# Patient Record
Sex: MALE | Race: ASIAN | HISPANIC OR LATINO | Employment: FULL TIME | URBAN - METROPOLITAN AREA
[De-identification: names, ages, dates, MRNs, and addresses within clinical notes are randomized per-mention and may not be internally consistent; named-entity substitution may affect disease eponyms.]

---

## 2024-03-20 ENCOUNTER — TELEPHONE (OUTPATIENT)
Dept: GASTROENTEROLOGY | Facility: CLINIC | Age: 54
End: 2024-03-20

## 2024-03-20 NOTE — TELEPHONE ENCOUNTER
Per Dr Swain, pts wife C/O of diverticulitis flare. Needs an appt to be seen in clinic with him.  Called Milind, no answer. Message left.  Phone: [phone]

## 2024-04-23 ENCOUNTER — HOSPITAL ENCOUNTER (INPATIENT)
Facility: HOSPITAL | Age: 54
LOS: 3 days | Discharge: HOME | DRG: 280 | End: 2024-04-26
Attending: FAMILY MEDICINE | Admitting: INTERNAL MEDICINE
Payer: COMMERCIAL

## 2024-04-23 DIAGNOSIS — N17.9 AKI (ACUTE KIDNEY INJURY): ICD-10-CM

## 2024-04-23 DIAGNOSIS — I47.20 V-TACH: ICD-10-CM

## 2024-04-23 DIAGNOSIS — I46.9 CARDIAC ARREST: Primary | ICD-10-CM

## 2024-04-23 DIAGNOSIS — I21.4 NSTEMI (NON-ST ELEVATED MYOCARDIAL INFARCTION): ICD-10-CM

## 2024-04-23 LAB
ALBUMIN SERPL BCP-MCNC: 4.1 G/DL (ref 3.5–5.2)
ALLENS TEST: ABNORMAL
ALP SERPL-CCNC: 102 U/L (ref 55–135)
ALT SERPL W/O P-5'-P-CCNC: 130 U/L (ref 10–44)
AMPHET+METHAMPHET UR QL: NEGATIVE
ANION GAP SERPL CALC-SCNC: 12 MMOL/L (ref 8–16)
APTT PPP: 26.1 SEC (ref 21–32)
AST SERPL-CCNC: 108 U/L (ref 10–40)
BACTERIA #/AREA URNS HPF: ABNORMAL /HPF
BARBITURATES UR QL SCN>200 NG/ML: NEGATIVE
BASOPHILS # BLD AUTO: 0.05 K/UL (ref 0–0.2)
BASOPHILS # BLD AUTO: 0.07 K/UL (ref 0–0.2)
BASOPHILS NFR BLD: 0.3 % (ref 0–1.9)
BASOPHILS NFR BLD: 0.9 % (ref 0–1.9)
BENZODIAZ UR QL SCN>200 NG/ML: NEGATIVE
BILIRUB SERPL-MCNC: 0.6 MG/DL (ref 0.1–1)
BILIRUB UR QL STRIP: NEGATIVE
BNP SERPL-MCNC: <10 PG/ML (ref 0–99)
BUN SERPL-MCNC: 16 MG/DL (ref 6–20)
BZE UR QL SCN: NEGATIVE
CALCIUM SERPL-MCNC: 9.8 MG/DL (ref 8.7–10.5)
CANNABINOIDS UR QL SCN: NEGATIVE
CHLORIDE SERPL-SCNC: 106 MMOL/L (ref 95–110)
CLARITY UR: CLEAR
CO2 SERPL-SCNC: 20 MMOL/L (ref 23–29)
COLOR UR: YELLOW
CREAT SERPL-MCNC: 1.7 MG/DL (ref 0.5–1.4)
CREAT UR-MCNC: 30.7 MG/DL (ref 23–375)
DELSYS: ABNORMAL
DIFFERENTIAL METHOD BLD: ABNORMAL
DIFFERENTIAL METHOD BLD: ABNORMAL
EOSINOPHIL # BLD AUTO: 0 K/UL (ref 0–0.5)
EOSINOPHIL # BLD AUTO: 0 K/UL (ref 0–0.5)
EOSINOPHIL NFR BLD: 0.1 % (ref 0–8)
EOSINOPHIL NFR BLD: 0.5 % (ref 0–8)
ERYTHROCYTE [DISTWIDTH] IN BLOOD BY AUTOMATED COUNT: 12.8 % (ref 11.5–14.5)
ERYTHROCYTE [DISTWIDTH] IN BLOOD BY AUTOMATED COUNT: 12.8 % (ref 11.5–14.5)
ERYTHROCYTE [SEDIMENTATION RATE] IN BLOOD BY WESTERGREN METHOD: 20 MM/H
EST. GFR  (NO RACE VARIABLE): 48 ML/MIN/1.73 M^2
FIO2: 100
GLUCOSE SERPL-MCNC: 195 MG/DL (ref 70–110)
GLUCOSE UR QL STRIP: ABNORMAL
HCO3 UR-SCNC: 23.2 MMOL/L (ref 24–28)
HCT VFR BLD AUTO: 45.4 % (ref 40–54)
HCT VFR BLD AUTO: 46.7 % (ref 40–54)
HGB BLD-MCNC: 16.1 G/DL (ref 14–18)
HGB BLD-MCNC: 16.5 G/DL (ref 14–18)
HGB UR QL STRIP: ABNORMAL
HYALINE CASTS #/AREA URNS LPF: 0 /LPF
IMM GRANULOCYTES # BLD AUTO: 0.06 K/UL (ref 0–0.04)
IMM GRANULOCYTES # BLD AUTO: 0.07 K/UL (ref 0–0.04)
IMM GRANULOCYTES NFR BLD AUTO: 0.5 % (ref 0–0.5)
IMM GRANULOCYTES NFR BLD AUTO: 0.8 % (ref 0–0.5)
INR PPP: 0.9 (ref 0.8–1.2)
KETONES UR QL STRIP: NEGATIVE
LACTATE SERPL-SCNC: 3.8 MMOL/L (ref 0.5–2.2)
LEUKOCYTE ESTERASE UR QL STRIP: NEGATIVE
LYMPHOCYTES # BLD AUTO: 1.3 K/UL (ref 1–4.8)
LYMPHOCYTES # BLD AUTO: 3.4 K/UL (ref 1–4.8)
LYMPHOCYTES NFR BLD: 44.9 % (ref 18–48)
LYMPHOCYTES NFR BLD: 8.6 % (ref 18–48)
MAGNESIUM SERPL-MCNC: 2.2 MG/DL (ref 1.6–2.6)
MCH RBC QN AUTO: 31.9 PG (ref 27–31)
MCH RBC QN AUTO: 32 PG (ref 27–31)
MCHC RBC AUTO-ENTMCNC: 35.3 G/DL (ref 32–36)
MCHC RBC AUTO-ENTMCNC: 35.5 G/DL (ref 32–36)
MCV RBC AUTO: 90 FL (ref 82–98)
MCV RBC AUTO: 91 FL (ref 82–98)
METHADONE UR QL SCN>300 NG/ML: NEGATIVE
MICROSCOPIC COMMENT: ABNORMAL
MODE: ABNORMAL
MONOCYTES # BLD AUTO: 0.6 K/UL (ref 0.3–1)
MONOCYTES # BLD AUTO: 1.1 K/UL (ref 0.3–1)
MONOCYTES NFR BLD: 7.4 % (ref 4–15)
MONOCYTES NFR BLD: 7.9 % (ref 4–15)
NEUTROPHILS # BLD AUTO: 12.7 K/UL (ref 1.8–7.7)
NEUTROPHILS # BLD AUTO: 3.4 K/UL (ref 1.8–7.7)
NEUTROPHILS NFR BLD: 45 % (ref 38–73)
NEUTROPHILS NFR BLD: 83.1 % (ref 38–73)
NITRITE UR QL STRIP: NEGATIVE
NRBC BLD-RTO: 0 /100 WBC
NRBC BLD-RTO: 0 /100 WBC
OPIATES UR QL SCN: NEGATIVE
PCO2 BLDA: 42.6 MMHG (ref 35–45)
PCP UR QL SCN>25 NG/ML: NEGATIVE
PEEP: 10
PH SMN: 7.34 [PH] (ref 7.35–7.45)
PH UR STRIP: 7 [PH] (ref 5–8)
PLATELET # BLD AUTO: 210 K/UL (ref 150–450)
PLATELET # BLD AUTO: 237 K/UL (ref 150–450)
PMV BLD AUTO: 10.2 FL (ref 9.2–12.9)
PMV BLD AUTO: 10.5 FL (ref 9.2–12.9)
PO2 BLDA: 508 MMHG (ref 80–100)
POC BE: -2 MMOL/L
POC SATURATED O2: 100 % (ref 95–100)
POTASSIUM SERPL-SCNC: 3.2 MMOL/L (ref 3.5–5.1)
PROCALCITONIN SERPL IA-MCNC: <0.02 NG/ML
PROT SERPL-MCNC: 7.1 G/DL (ref 6–8.4)
PROT UR QL STRIP: ABNORMAL
PROTHROMBIN TIME: 10.7 SEC (ref 9–12.5)
RBC # BLD AUTO: 5.04 M/UL (ref 4.6–6.2)
RBC # BLD AUTO: 5.16 M/UL (ref 4.6–6.2)
RBC #/AREA URNS HPF: 35 /HPF (ref 0–4)
SAMPLE: ABNORMAL
SITE: ABNORMAL
SODIUM SERPL-SCNC: 138 MMOL/L (ref 136–145)
SP GR UR STRIP: 1.01 (ref 1–1.03)
TOXICOLOGY INFORMATION: NORMAL
TROPONIN I SERPL DL<=0.01 NG/ML-MCNC: 0.11 NG/ML (ref 0–0.03)
TROPONIN I SERPL DL<=0.01 NG/ML-MCNC: 6.82 NG/ML (ref 0–0.03)
URN SPEC COLLECT METH UR: ABNORMAL
UROBILINOGEN UR STRIP-ACNC: NEGATIVE EU/DL
VT: 450
WBC # BLD AUTO: 15.27 K/UL (ref 3.9–12.7)
WBC # BLD AUTO: 7.64 K/UL (ref 3.9–12.7)
WBC #/AREA URNS HPF: 7 /HPF (ref 0–5)

## 2024-04-23 PROCEDURE — 99900026 HC AIRWAY MAINTENANCE (STAT)

## 2024-04-23 PROCEDURE — 82962 GLUCOSE BLOOD TEST: CPT

## 2024-04-23 PROCEDURE — 87154 CUL TYP ID BLD PTHGN 6+ TRGT: CPT | Performed by: FAMILY MEDICINE

## 2024-04-23 PROCEDURE — 63600175 PHARM REV CODE 636 W HCPCS

## 2024-04-23 PROCEDURE — 93010 ELECTROCARDIOGRAM REPORT: CPT | Mod: ,,, | Performed by: INTERNAL MEDICINE

## 2024-04-23 PROCEDURE — 63600175 PHARM REV CODE 636 W HCPCS: Performed by: FAMILY MEDICINE

## 2024-04-23 PROCEDURE — 20000000 HC ICU ROOM

## 2024-04-23 PROCEDURE — 85730 THROMBOPLASTIN TIME PARTIAL: CPT | Performed by: FAMILY MEDICINE

## 2024-04-23 PROCEDURE — 5A1945Z RESPIRATORY VENTILATION, 24-96 CONSECUTIVE HOURS: ICD-10-PCS | Performed by: OBSTETRICS & GYNECOLOGY

## 2024-04-23 PROCEDURE — 83880 ASSAY OF NATRIURETIC PEPTIDE: CPT | Performed by: FAMILY MEDICINE

## 2024-04-23 PROCEDURE — 94761 N-INVAS EAR/PLS OXIMETRY MLT: CPT | Mod: XB

## 2024-04-23 PROCEDURE — 36620 INSERTION CATHETER ARTERY: CPT

## 2024-04-23 PROCEDURE — 0BH17EZ INSERTION OF ENDOTRACHEAL AIRWAY INTO TRACHEA, VIA NATURAL OR ARTIFICIAL OPENING: ICD-10-PCS | Performed by: OBSTETRICS & GYNECOLOGY

## 2024-04-23 PROCEDURE — 31500 INSERT EMERGENCY AIRWAY: CPT

## 2024-04-23 PROCEDURE — 27100171 HC OXYGEN HIGH FLOW UP TO 24 HOURS

## 2024-04-23 PROCEDURE — 94002 VENT MGMT INPAT INIT DAY: CPT

## 2024-04-23 PROCEDURE — 84484 ASSAY OF TROPONIN QUANT: CPT | Mod: 91 | Performed by: FAMILY MEDICINE

## 2024-04-23 PROCEDURE — 93005 ELECTROCARDIOGRAM TRACING: CPT

## 2024-04-23 PROCEDURE — 96365 THER/PROPH/DIAG IV INF INIT: CPT | Mod: 59

## 2024-04-23 PROCEDURE — 96374 THER/PROPH/DIAG INJ IV PUSH: CPT | Mod: 59

## 2024-04-23 PROCEDURE — 25000003 PHARM REV CODE 250: Performed by: FAMILY MEDICINE

## 2024-04-23 PROCEDURE — 83735 ASSAY OF MAGNESIUM: CPT | Performed by: INTERNAL MEDICINE

## 2024-04-23 PROCEDURE — 87076 CULTURE ANAEROBE IDENT EACH: CPT | Performed by: FAMILY MEDICINE

## 2024-04-23 PROCEDURE — 82803 BLOOD GASES ANY COMBINATION: CPT

## 2024-04-23 PROCEDURE — 36600 WITHDRAWAL OF ARTERIAL BLOOD: CPT

## 2024-04-23 PROCEDURE — 87070 CULTURE OTHR SPECIMN AEROBIC: CPT

## 2024-04-23 PROCEDURE — 80053 COMPREHEN METABOLIC PANEL: CPT | Performed by: FAMILY MEDICINE

## 2024-04-23 PROCEDURE — 84145 PROCALCITONIN (PCT): CPT | Performed by: FAMILY MEDICINE

## 2024-04-23 PROCEDURE — 83605 ASSAY OF LACTIC ACID: CPT | Mod: 91 | Performed by: FAMILY MEDICINE

## 2024-04-23 PROCEDURE — 80307 DRUG TEST PRSMV CHEM ANLYZR: CPT | Performed by: FAMILY MEDICINE

## 2024-04-23 PROCEDURE — 85610 PROTHROMBIN TIME: CPT | Performed by: FAMILY MEDICINE

## 2024-04-23 PROCEDURE — 99900035 HC TECH TIME PER 15 MIN (STAT)

## 2024-04-23 PROCEDURE — 25000003 PHARM REV CODE 250: Performed by: INTERNAL MEDICINE

## 2024-04-23 PROCEDURE — 25000003 PHARM REV CODE 250

## 2024-04-23 PROCEDURE — 87040 BLOOD CULTURE FOR BACTERIA: CPT | Performed by: FAMILY MEDICINE

## 2024-04-23 PROCEDURE — 84484 ASSAY OF TROPONIN QUANT: CPT | Performed by: INTERNAL MEDICINE

## 2024-04-23 PROCEDURE — 85025 COMPLETE CBC W/AUTO DIFF WBC: CPT | Mod: 91 | Performed by: FAMILY MEDICINE

## 2024-04-23 PROCEDURE — 99291 CRITICAL CARE FIRST HOUR: CPT | Mod: 25

## 2024-04-23 PROCEDURE — 87205 SMEAR GRAM STAIN: CPT

## 2024-04-23 PROCEDURE — 81000 URINALYSIS NONAUTO W/SCOPE: CPT | Mod: 59 | Performed by: FAMILY MEDICINE

## 2024-04-23 PROCEDURE — 03HB33Z INSERTION OF INFUSION DEVICE INTO RIGHT RADIAL ARTERY, PERCUTANEOUS APPROACH: ICD-10-PCS | Performed by: INTERNAL MEDICINE

## 2024-04-23 RX ORDER — PROPOFOL 10 MG/ML
20 INJECTION, EMULSION INTRAVENOUS
Status: COMPLETED | OUTPATIENT
Start: 2024-04-23 | End: 2024-04-23

## 2024-04-23 RX ORDER — PROPOFOL 10 MG/ML
INJECTION, EMULSION INTRAVENOUS
Status: COMPLETED
Start: 2024-04-23 | End: 2024-04-23

## 2024-04-23 RX ORDER — HEPARIN SODIUM,PORCINE/D5W 25000/250
0-40 INTRAVENOUS SOLUTION INTRAVENOUS CONTINUOUS
Status: DISCONTINUED | OUTPATIENT
Start: 2024-04-23 | End: 2024-04-24

## 2024-04-23 RX ORDER — POTASSIUM CHLORIDE 7.45 MG/ML
40 INJECTION INTRAVENOUS
Status: DISCONTINUED | OUTPATIENT
Start: 2024-04-23 | End: 2024-04-26 | Stop reason: HOSPADM

## 2024-04-23 RX ORDER — CHLORHEXIDINE GLUCONATE ORAL RINSE 1.2 MG/ML
15 SOLUTION DENTAL 2 TIMES DAILY
Status: DISCONTINUED | OUTPATIENT
Start: 2024-04-24 | End: 2024-04-24

## 2024-04-23 RX ORDER — FENTANYL CITRATE-0.9 % NACL/PF 10 MCG/ML
0-250 PLASTIC BAG, INJECTION (ML) INTRAVENOUS CONTINUOUS
Status: DISCONTINUED | OUTPATIENT
Start: 2024-04-23 | End: 2024-04-24

## 2024-04-23 RX ORDER — SUCCINYLCHOLINE CHLORIDE 20 MG/ML
INJECTION INTRAMUSCULAR; INTRAVENOUS
Status: COMPLETED
Start: 2024-04-23 | End: 2024-04-23

## 2024-04-23 RX ORDER — NOREPINEPHRINE BITARTRATE/D5W 4MG/250ML
0-.2 PLASTIC BAG, INJECTION (ML) INTRAVENOUS CONTINUOUS
Status: DISCONTINUED | OUTPATIENT
Start: 2024-04-23 | End: 2024-04-24

## 2024-04-23 RX ORDER — POTASSIUM CHLORIDE 7.45 MG/ML
60 INJECTION INTRAVENOUS
Status: DISCONTINUED | OUTPATIENT
Start: 2024-04-23 | End: 2024-04-26 | Stop reason: HOSPADM

## 2024-04-23 RX ORDER — POTASSIUM CHLORIDE 7.45 MG/ML
10 INJECTION INTRAVENOUS
Status: COMPLETED | OUTPATIENT
Start: 2024-04-23 | End: 2024-04-23

## 2024-04-23 RX ORDER — MUPIROCIN 20 MG/G
OINTMENT TOPICAL 2 TIMES DAILY
Status: DISCONTINUED | OUTPATIENT
Start: 2024-04-23 | End: 2024-04-26 | Stop reason: HOSPADM

## 2024-04-23 RX ORDER — POTASSIUM CHLORIDE 7.45 MG/ML
80 INJECTION INTRAVENOUS
Status: DISCONTINUED | OUTPATIENT
Start: 2024-04-23 | End: 2024-04-26 | Stop reason: HOSPADM

## 2024-04-23 RX ORDER — MAGNESIUM SULFATE HEPTAHYDRATE 40 MG/ML
4 INJECTION, SOLUTION INTRAVENOUS
Status: DISCONTINUED | OUTPATIENT
Start: 2024-04-23 | End: 2024-04-26 | Stop reason: HOSPADM

## 2024-04-23 RX ORDER — MAGNESIUM SULFATE HEPTAHYDRATE 40 MG/ML
2 INJECTION, SOLUTION INTRAVENOUS
Status: DISCONTINUED | OUTPATIENT
Start: 2024-04-23 | End: 2024-04-26 | Stop reason: HOSPADM

## 2024-04-23 RX ORDER — NOREPINEPHRINE BITARTRATE/D5W 4MG/250ML
PLASTIC BAG, INJECTION (ML) INTRAVENOUS
Status: COMPLETED
Start: 2024-04-23 | End: 2024-04-23

## 2024-04-23 RX ORDER — PROPOFOL 10 MG/ML
0-50 INJECTION, EMULSION INTRAVENOUS CONTINUOUS
Status: DISCONTINUED | OUTPATIENT
Start: 2024-04-23 | End: 2024-04-24

## 2024-04-23 RX ORDER — SODIUM CHLORIDE 0.9 % (FLUSH) 0.9 %
10 SYRINGE (ML) INJECTION
Status: DISCONTINUED | OUTPATIENT
Start: 2024-04-23 | End: 2024-04-26 | Stop reason: HOSPADM

## 2024-04-23 RX ADMIN — SUCCINYLCHOLINE CHLORIDE 100 MG: 20 INJECTION, SOLUTION INTRAMUSCULAR; INTRAVENOUS; PARENTERAL at 08:04

## 2024-04-23 RX ADMIN — POTASSIUM CHLORIDE 10 MEQ: 7.46 INJECTION, SOLUTION INTRAVENOUS at 09:04

## 2024-04-23 RX ADMIN — SODIUM CHLORIDE, POTASSIUM CHLORIDE, SODIUM LACTATE AND CALCIUM CHLORIDE 1000 ML: 600; 310; 30; 20 INJECTION, SOLUTION INTRAVENOUS at 11:04

## 2024-04-23 RX ADMIN — PIPERACILLIN SODIUM AND TAZOBACTAM SODIUM 4.5 G: 4; .5 INJECTION, POWDER, FOR SOLUTION INTRAVENOUS at 11:04

## 2024-04-23 RX ADMIN — Medication 25 MCG/HR: at 09:04

## 2024-04-23 RX ADMIN — HEPARIN SODIUM 12 UNITS/KG/HR: 10000 INJECTION, SOLUTION INTRAVENOUS at 11:04

## 2024-04-23 RX ADMIN — PROPOFOL 50 MCG/KG/MIN: 10 INJECTION, EMULSION INTRAVENOUS at 11:04

## 2024-04-23 RX ADMIN — Medication 0.15 MCG/KG/MIN: at 11:04

## 2024-04-23 RX ADMIN — PROPOFOL 20 MCG/KG/MIN: 10 INJECTION, EMULSION INTRAVENOUS at 08:04

## 2024-04-23 RX ADMIN — NOREPINEPHRINE BITARTRATE 0.15 MCG/KG/MIN: 4 INJECTION, SOLUTION INTRAVENOUS at 11:04

## 2024-04-23 RX ADMIN — MUPIROCIN: 20 OINTMENT TOPICAL at 11:04

## 2024-04-23 NOTE — Clinical Note
The left ventricle was injected. The injected rate was 12 mL/sec. The total injected volume was 15 mL.

## 2024-04-24 DIAGNOSIS — I49.01 VENTRICULAR FIBRILLATION: ICD-10-CM

## 2024-04-24 DIAGNOSIS — I46.9 CARDIAC ARREST: Primary | ICD-10-CM

## 2024-04-24 PROBLEM — E87.20 LACTIC ACIDOSIS: Status: ACTIVE | Noted: 2024-04-24

## 2024-04-24 PROBLEM — A41.9 SEVERE SEPSIS: Status: RESOLVED | Noted: 2024-04-24 | Resolved: 2024-04-24

## 2024-04-24 PROBLEM — K72.00 SHOCK LIVER: Status: ACTIVE | Noted: 2024-04-24

## 2024-04-24 PROBLEM — I21.4 NSTEMI (NON-ST ELEVATED MYOCARDIAL INFARCTION): Status: ACTIVE | Noted: 2024-04-24

## 2024-04-24 PROBLEM — N17.9 AKI (ACUTE KIDNEY INJURY): Status: ACTIVE | Noted: 2024-04-24

## 2024-04-24 PROBLEM — Z87.898 HISTORY OF PALPITATIONS: Chronic | Status: ACTIVE | Noted: 2024-04-24

## 2024-04-24 PROBLEM — R65.20 SEVERE SEPSIS: Status: RESOLVED | Noted: 2024-04-24 | Resolved: 2024-04-24

## 2024-04-24 PROBLEM — I10 HYPERTENSION: Chronic | Status: ACTIVE | Noted: 2024-04-24

## 2024-04-24 PROBLEM — A41.9 SEVERE SEPSIS: Status: ACTIVE | Noted: 2024-04-24

## 2024-04-24 PROBLEM — R65.20 SEVERE SEPSIS: Status: ACTIVE | Noted: 2024-04-24

## 2024-04-24 PROBLEM — E78.5 HYPERLIPIDEMIA: Chronic | Status: ACTIVE | Noted: 2024-04-24

## 2024-04-24 PROBLEM — E87.6 HYPOKALEMIA: Status: ACTIVE | Noted: 2024-04-24

## 2024-04-24 PROBLEM — Z99.11 ON MECHANICALLY ASSISTED VENTILATION: Status: ACTIVE | Noted: 2024-04-24

## 2024-04-24 LAB
ALBUMIN SERPL BCP-MCNC: 3.5 G/DL (ref 3.5–5.2)
ALLENS TEST: ABNORMAL
ALP SERPL-CCNC: 70 U/L (ref 55–135)
ALT SERPL W/O P-5'-P-CCNC: 102 U/L (ref 10–44)
ANION GAP SERPL CALC-SCNC: 7 MMOL/L (ref 8–16)
AORTIC ROOT ANNULUS: 3.12 CM
APTT PPP: 64.7 SEC (ref 21–32)
ASCENDING AORTA: 3.18 CM
AST SERPL-CCNC: 74 U/L (ref 10–40)
AV INDEX (PROSTH): 0.83
AV MEAN GRADIENT: 7 MMHG
AV PEAK GRADIENT: 12 MMHG
AV VALVE AREA BY VELOCITY RATIO: 2.26 CM²
AV VALVE AREA: 2.49 CM²
AV VELOCITY RATIO: 0.76
BASOPHILS # BLD AUTO: 0.04 K/UL (ref 0–0.2)
BASOPHILS NFR BLD: 0.4 % (ref 0–1.9)
BILIRUB SERPL-MCNC: 1 MG/DL (ref 0.1–1)
BSA FOR ECHO PROCEDURE: 2.07 M2
BUN SERPL-MCNC: 18 MG/DL (ref 6–20)
CALCIUM SERPL-MCNC: 9 MG/DL (ref 8.7–10.5)
CATH EF QUANTITATIVE: 65 %
CHLORIDE SERPL-SCNC: 108 MMOL/L (ref 95–110)
CHOLEST SERPL-MCNC: 181 MG/DL (ref 120–199)
CHOLEST/HDLC SERPL: 4.3 {RATIO} (ref 2–5)
CK SERPL-CCNC: 304 U/L (ref 20–200)
CO2 SERPL-SCNC: 20 MMOL/L (ref 23–29)
CREAT SERPL-MCNC: 1.9 MG/DL (ref 0.5–1.4)
CV ECHO LV RWT: 0.52 CM
DELSYS: ABNORMAL
DIFFERENTIAL METHOD BLD: ABNORMAL
DOP CALC AO PEAK VEL: 1.72 M/S
DOP CALC AO VTI: 29.4 CM
DOP CALC LVOT AREA: 3 CM2
DOP CALC LVOT DIAMETER: 1.95 CM
DOP CALC LVOT PEAK VEL: 1.3 M/S
DOP CALC LVOT STROKE VOLUME: 73.13 CM3
DOP CALC RVOT PEAK VEL: 1.02 M/S
DOP CALC RVOT VTI: 15.7 CM
DOP CALCLVOT PEAK VEL VTI: 24.5 CM
E WAVE DECELERATION TIME: 151.19 MSEC
E/A RATIO: 1.48
E/E' RATIO: 11.25 M/S
ECHO LV POSTERIOR WALL: 1.13 CM (ref 0.6–1.1)
EOSINOPHIL # BLD AUTO: 0 K/UL (ref 0–0.5)
EOSINOPHIL NFR BLD: 0.1 % (ref 0–8)
ERYTHROCYTE [DISTWIDTH] IN BLOOD BY AUTOMATED COUNT: 12.7 % (ref 11.5–14.5)
ERYTHROCYTE [SEDIMENTATION RATE] IN BLOOD BY WESTERGREN METHOD: 16 MM/H
EST. GFR  (NO RACE VARIABLE): 42 ML/MIN/1.73 M^2
ESTIMATED AVG GLUCOSE: 97 MG/DL (ref 68–131)
FIO2: 40
FRACTIONAL SHORTENING: 31 % (ref 28–44)
GLUCOSE SERPL-MCNC: 132 MG/DL (ref 70–110)
HBA1C MFR BLD: 5 % (ref 4–5.6)
HCO3 UR-SCNC: 23.6 MMOL/L (ref 24–28)
HCT VFR BLD AUTO: 41 % (ref 40–54)
HDLC SERPL-MCNC: 42 MG/DL (ref 40–75)
HDLC SERPL: 23.2 % (ref 20–50)
HGB BLD-MCNC: 14.5 G/DL (ref 14–18)
IMM GRANULOCYTES # BLD AUTO: 0.04 K/UL (ref 0–0.04)
IMM GRANULOCYTES NFR BLD AUTO: 0.4 % (ref 0–0.5)
INFLUENZA A, MOLECULAR: NEGATIVE
INFLUENZA B, MOLECULAR: NEGATIVE
INTERVENTRICULAR SEPTUM: 1.19 CM (ref 0.6–1.1)
IVC DIAMETER: 1.95 CM
IVRT: 49.48 MSEC
LA MAJOR: 5.13 CM
LA MINOR: 5.21 CM
LA WIDTH: 3.4 CM
LACTATE SERPL-SCNC: 1.4 MMOL/L (ref 0.5–2.2)
LDLC SERPL CALC-MCNC: 109.6 MG/DL (ref 63–159)
LEFT ATRIUM SIZE: 3.67 CM
LEFT ATRIUM VOLUME INDEX: 26.9 ML/M2
LEFT ATRIUM VOLUME: 54.83 CM3
LEFT INTERNAL DIMENSION IN SYSTOLE: 2.99 CM (ref 2.1–4)
LEFT VENTRICLE DIASTOLIC VOLUME INDEX: 41.59 ML/M2
LEFT VENTRICLE DIASTOLIC VOLUME: 84.84 ML
LEFT VENTRICLE MASS INDEX: 87 G/M2
LEFT VENTRICLE SYSTOLIC VOLUME INDEX: 17 ML/M2
LEFT VENTRICLE SYSTOLIC VOLUME: 34.66 ML
LEFT VENTRICULAR INTERNAL DIMENSION IN DIASTOLE: 4.34 CM (ref 3.5–6)
LEFT VENTRICULAR MASS: 178.37 G
LV LATERAL E/E' RATIO: 10.38 M/S
LV SEPTAL E/E' RATIO: 12.27 M/S
LVOT MG: 4.87 MMHG
LVOT MV: 1.07 CM/S
LYMPHOCYTES # BLD AUTO: 1.5 K/UL (ref 1–4.8)
LYMPHOCYTES NFR BLD: 13.6 % (ref 18–48)
MAGNESIUM SERPL-MCNC: 1.8 MG/DL (ref 1.6–2.6)
MCH RBC QN AUTO: 31.8 PG (ref 27–31)
MCHC RBC AUTO-ENTMCNC: 35.4 G/DL (ref 32–36)
MCV RBC AUTO: 90 FL (ref 82–98)
MODE: ABNORMAL
MONOCYTES # BLD AUTO: 0.9 K/UL (ref 0.3–1)
MONOCYTES NFR BLD: 8.2 % (ref 4–15)
MV PEAK A VEL: 0.91 M/S
MV PEAK E VEL: 1.35 M/S
MV STENOSIS PRESSURE HALF TIME: 43.85 MS
MV VALVE AREA P 1/2 METHOD: 5.02 CM2
NEUTROPHILS # BLD AUTO: 8.3 K/UL (ref 1.8–7.7)
NEUTROPHILS NFR BLD: 77.3 % (ref 38–73)
NONHDLC SERPL-MCNC: 139 MG/DL
NRBC BLD-RTO: 0 /100 WBC
OHS QRS DURATION: 106 MS
OHS QRS DURATION: 96 MS
OHS QTC CALCULATION: 392 MS
OHS QTC CALCULATION: 437 MS
PCO2 BLDA: 45.2 MMHG (ref 35–45)
PEEP: 5
PH SMN: 7.33 [PH] (ref 7.35–7.45)
PHOSPHATE SERPL-MCNC: 3.6 MG/DL (ref 2.7–4.5)
PISA MRMAX VEL: 3.09 M/S
PLATELET # BLD AUTO: 210 K/UL (ref 150–450)
PMV BLD AUTO: 10.1 FL (ref 9.2–12.9)
PO2 BLDA: 163 MMHG (ref 80–100)
POC BE: -2 MMOL/L
POC SATURATED O2: 99 % (ref 95–100)
POCT GLUCOSE: 101 MG/DL (ref 70–110)
POCT GLUCOSE: 129 MG/DL (ref 70–110)
POTASSIUM SERPL-SCNC: 4.4 MMOL/L (ref 3.5–5.1)
PROT SERPL-MCNC: 5.9 G/DL (ref 6–8.4)
PV MEAN GRADIENT: 3 MMHG
RA MAJOR: 4.4 CM
RA WIDTH: 2.8 CM
RBC # BLD AUTO: 4.56 M/UL (ref 4.6–6.2)
SAMPLE: ABNORMAL
SARS-COV-2 RDRP RESP QL NAA+PROBE: NEGATIVE
SITE: ABNORMAL
SODIUM SERPL-SCNC: 135 MMOL/L (ref 136–145)
SPECIMEN SOURCE: NORMAL
STJ: 3.32 CM
TDI LATERAL: 0.13 M/S
TDI SEPTAL: 0.11 M/S
TDI: 0.12 M/S
TRICUSPID ANNULAR PLANE SYSTOLIC EXCURSION: 2.05 CM
TRIGL SERPL-MCNC: 147 MG/DL (ref 30–150)
TROPONIN I SERPL DL<=0.01 NG/ML-MCNC: 11.57 NG/ML (ref 0–0.03)
TROPONIN I SERPL DL<=0.01 NG/ML-MCNC: 2.49 NG/ML (ref 0–0.03)
TROPONIN I SERPL DL<=0.01 NG/ML-MCNC: 3.76 NG/ML (ref 0–0.03)
TSH SERPL DL<=0.005 MIU/L-ACNC: 1.61 UIU/ML (ref 0.4–4)
VT: 450
WBC # BLD AUTO: 10.72 K/UL (ref 3.9–12.7)
Z-SCORE OF LEFT VENTRICULAR DIMENSION IN END DIASTOLE: -3.37
Z-SCORE OF LEFT VENTRICULAR DIMENSION IN END SYSTOLE: -1.74

## 2024-04-24 PROCEDURE — 83036 HEMOGLOBIN GLYCOSYLATED A1C: CPT

## 2024-04-24 PROCEDURE — 84484 ASSAY OF TROPONIN QUANT: CPT | Mod: 91

## 2024-04-24 PROCEDURE — 93458 L HRT ARTERY/VENTRICLE ANGIO: CPT | Mod: 26,,, | Performed by: INTERNAL MEDICINE

## 2024-04-24 PROCEDURE — 93010 ELECTROCARDIOGRAM REPORT: CPT | Mod: ,,, | Performed by: INTERNAL MEDICINE

## 2024-04-24 PROCEDURE — 93458 L HRT ARTERY/VENTRICLE ANGIO: CPT | Performed by: INTERNAL MEDICINE

## 2024-04-24 PROCEDURE — B2151ZZ FLUOROSCOPY OF LEFT HEART USING LOW OSMOLAR CONTRAST: ICD-10-PCS | Performed by: INTERNAL MEDICINE

## 2024-04-24 PROCEDURE — 80053 COMPREHEN METABOLIC PANEL: CPT

## 2024-04-24 PROCEDURE — 82803 BLOOD GASES ANY COMBINATION: CPT

## 2024-04-24 PROCEDURE — C1894 INTRO/SHEATH, NON-LASER: HCPCS | Performed by: INTERNAL MEDICINE

## 2024-04-24 PROCEDURE — 94003 VENT MGMT INPAT SUBQ DAY: CPT

## 2024-04-24 PROCEDURE — 87502 INFLUENZA DNA AMP PROBE: CPT

## 2024-04-24 PROCEDURE — 99153 MOD SED SAME PHYS/QHP EA: CPT | Performed by: INTERNAL MEDICINE

## 2024-04-24 PROCEDURE — 25500020 PHARM REV CODE 255: Performed by: INTERNAL MEDICINE

## 2024-04-24 PROCEDURE — 99152 MOD SED SAME PHYS/QHP 5/>YRS: CPT | Performed by: INTERNAL MEDICINE

## 2024-04-24 PROCEDURE — 37799 UNLISTED PX VASCULAR SURGERY: CPT

## 2024-04-24 PROCEDURE — 63600175 PHARM REV CODE 636 W HCPCS

## 2024-04-24 PROCEDURE — 63600175 PHARM REV CODE 636 W HCPCS: Performed by: INTERNAL MEDICINE

## 2024-04-24 PROCEDURE — 99152 MOD SED SAME PHYS/QHP 5/>YRS: CPT | Mod: ,,, | Performed by: INTERNAL MEDICINE

## 2024-04-24 PROCEDURE — 25000003 PHARM REV CODE 250: Performed by: INTERNAL MEDICINE

## 2024-04-24 PROCEDURE — 94761 N-INVAS EAR/PLS OXIMETRY MLT: CPT

## 2024-04-24 PROCEDURE — 36415 COLL VENOUS BLD VENIPUNCTURE: CPT | Performed by: FAMILY MEDICINE

## 2024-04-24 PROCEDURE — U0002 COVID-19 LAB TEST NON-CDC: HCPCS

## 2024-04-24 PROCEDURE — 84443 ASSAY THYROID STIM HORMONE: CPT | Performed by: FAMILY MEDICINE

## 2024-04-24 PROCEDURE — 25000003 PHARM REV CODE 250: Performed by: FAMILY MEDICINE

## 2024-04-24 PROCEDURE — 85025 COMPLETE CBC W/AUTO DIFF WBC: CPT | Performed by: FAMILY MEDICINE

## 2024-04-24 PROCEDURE — 99900035 HC TECH TIME PER 15 MIN (STAT)

## 2024-04-24 PROCEDURE — 27100171 HC OXYGEN HIGH FLOW UP TO 24 HOURS

## 2024-04-24 PROCEDURE — B2111ZZ FLUOROSCOPY OF MULTIPLE CORONARY ARTERIES USING LOW OSMOLAR CONTRAST: ICD-10-PCS | Performed by: INTERNAL MEDICINE

## 2024-04-24 PROCEDURE — 80061 LIPID PANEL: CPT

## 2024-04-24 PROCEDURE — 99223 1ST HOSP IP/OBS HIGH 75: CPT | Mod: FS,,, | Performed by: INTERNAL MEDICINE

## 2024-04-24 PROCEDURE — 99900026 HC AIRWAY MAINTENANCE (STAT)

## 2024-04-24 PROCEDURE — 82550 ASSAY OF CK (CPK): CPT | Performed by: FAMILY MEDICINE

## 2024-04-24 PROCEDURE — 93005 ELECTROCARDIOGRAM TRACING: CPT

## 2024-04-24 PROCEDURE — 85730 THROMBOPLASTIN TIME PARTIAL: CPT | Performed by: INTERNAL MEDICINE

## 2024-04-24 PROCEDURE — 20000000 HC ICU ROOM

## 2024-04-24 PROCEDURE — 4A023N7 MEASUREMENT OF CARDIAC SAMPLING AND PRESSURE, LEFT HEART, PERCUTANEOUS APPROACH: ICD-10-PCS | Performed by: INTERNAL MEDICINE

## 2024-04-24 PROCEDURE — 84100 ASSAY OF PHOSPHORUS: CPT

## 2024-04-24 PROCEDURE — C1769 GUIDE WIRE: HCPCS | Performed by: INTERNAL MEDICINE

## 2024-04-24 PROCEDURE — 25000003 PHARM REV CODE 250

## 2024-04-24 PROCEDURE — 99291 CRITICAL CARE FIRST HOUR: CPT | Mod: ,,, | Performed by: INTERNAL MEDICINE

## 2024-04-24 PROCEDURE — 83735 ASSAY OF MAGNESIUM: CPT

## 2024-04-24 RX ORDER — DIPHENHYDRAMINE HYDROCHLORIDE 50 MG/ML
INJECTION INTRAMUSCULAR; INTRAVENOUS
Status: DISCONTINUED | OUTPATIENT
Start: 2024-04-24 | End: 2024-04-24 | Stop reason: HOSPADM

## 2024-04-24 RX ORDER — ACETAMINOPHEN 325 MG/1
650 TABLET ORAL EVERY 4 HOURS PRN
Status: DISCONTINUED | OUTPATIENT
Start: 2024-04-24 | End: 2024-04-26 | Stop reason: HOSPADM

## 2024-04-24 RX ORDER — ENOXAPARIN SODIUM 100 MG/ML
40 INJECTION SUBCUTANEOUS EVERY 24 HOURS
Status: DISCONTINUED | OUTPATIENT
Start: 2024-04-25 | End: 2024-04-26 | Stop reason: HOSPADM

## 2024-04-24 RX ORDER — SODIUM CHLORIDE 9 MG/ML
INJECTION, SOLUTION INTRAVENOUS CONTINUOUS
Status: DISCONTINUED | OUTPATIENT
Start: 2024-04-24 | End: 2024-04-24

## 2024-04-24 RX ORDER — FAMOTIDINE 10 MG/ML
20 INJECTION INTRAVENOUS DAILY
Status: DISCONTINUED | OUTPATIENT
Start: 2024-04-25 | End: 2024-04-24

## 2024-04-24 RX ORDER — PROPOFOL 10 MG/ML
0-50 INJECTION, EMULSION INTRAVENOUS CONTINUOUS
Status: DISCONTINUED | OUTPATIENT
Start: 2024-04-24 | End: 2024-04-24

## 2024-04-24 RX ORDER — SODIUM CHLORIDE, SODIUM LACTATE, POTASSIUM CHLORIDE, CALCIUM CHLORIDE 600; 310; 30; 20 MG/100ML; MG/100ML; MG/100ML; MG/100ML
INJECTION, SOLUTION INTRAVENOUS CONTINUOUS
Status: DISCONTINUED | OUTPATIENT
Start: 2024-04-24 | End: 2024-04-24

## 2024-04-24 RX ORDER — NITROGLYCERIN 5 MG/ML
INJECTION, SOLUTION INTRAVENOUS
Status: DISCONTINUED | OUTPATIENT
Start: 2024-04-24 | End: 2024-04-24 | Stop reason: HOSPADM

## 2024-04-24 RX ORDER — VERAPAMIL HYDROCHLORIDE 2.5 MG/ML
INJECTION, SOLUTION INTRAVENOUS
Status: DISCONTINUED | OUTPATIENT
Start: 2024-04-24 | End: 2024-04-24 | Stop reason: HOSPADM

## 2024-04-24 RX ORDER — LABETALOL HYDROCHLORIDE 5 MG/ML
10 INJECTION, SOLUTION INTRAVENOUS EVERY 6 HOURS PRN
Status: DISCONTINUED | OUTPATIENT
Start: 2024-04-24 | End: 2024-04-26 | Stop reason: HOSPADM

## 2024-04-24 RX ORDER — HEPARIN SODIUM 1000 [USP'U]/ML
INJECTION, SOLUTION INTRAVENOUS; SUBCUTANEOUS
Status: DISCONTINUED | OUTPATIENT
Start: 2024-04-24 | End: 2024-04-24 | Stop reason: HOSPADM

## 2024-04-24 RX ORDER — HEPARIN SODIUM,PORCINE/D5W 25000/250
0-40 INTRAVENOUS SOLUTION INTRAVENOUS CONTINUOUS
Status: DISCONTINUED | OUTPATIENT
Start: 2024-04-24 | End: 2024-04-24

## 2024-04-24 RX ORDER — ONDANSETRON 8 MG/1
8 TABLET, ORALLY DISINTEGRATING ORAL EVERY 8 HOURS PRN
Status: DISCONTINUED | OUTPATIENT
Start: 2024-04-24 | End: 2024-04-26 | Stop reason: HOSPADM

## 2024-04-24 RX ORDER — LIDOCAINE HYDROCHLORIDE 10 MG/ML
INJECTION, SOLUTION EPIDURAL; INFILTRATION; INTRACAUDAL; PERINEURAL
Status: DISCONTINUED | OUTPATIENT
Start: 2024-04-24 | End: 2024-04-24 | Stop reason: HOSPADM

## 2024-04-24 RX ORDER — MAGNESIUM SULFATE HEPTAHYDRATE 40 MG/ML
2 INJECTION, SOLUTION INTRAVENOUS ONCE
Status: COMPLETED | OUTPATIENT
Start: 2024-04-24 | End: 2024-04-24

## 2024-04-24 RX ORDER — ALPRAZOLAM 0.5 MG/1
0.5 TABLET ORAL NIGHTLY PRN
Status: DISCONTINUED | OUTPATIENT
Start: 2024-04-24 | End: 2024-04-26 | Stop reason: HOSPADM

## 2024-04-24 RX ORDER — FENTANYL CITRATE 50 UG/ML
INJECTION, SOLUTION INTRAMUSCULAR; INTRAVENOUS
Status: DISCONTINUED | OUTPATIENT
Start: 2024-04-24 | End: 2024-04-24 | Stop reason: HOSPADM

## 2024-04-24 RX ORDER — MIDAZOLAM HYDROCHLORIDE 1 MG/ML
INJECTION, SOLUTION INTRAMUSCULAR; INTRAVENOUS
Status: DISCONTINUED | OUTPATIENT
Start: 2024-04-24 | End: 2024-04-24 | Stop reason: HOSPADM

## 2024-04-24 RX ORDER — NAPROXEN SODIUM 220 MG/1
81 TABLET, FILM COATED ORAL DAILY
Status: DISCONTINUED | OUTPATIENT
Start: 2024-04-24 | End: 2024-04-26 | Stop reason: HOSPADM

## 2024-04-24 RX ORDER — FAMOTIDINE 10 MG/ML
20 INJECTION INTRAVENOUS 2 TIMES DAILY
Status: DISCONTINUED | OUTPATIENT
Start: 2024-04-24 | End: 2024-04-24

## 2024-04-24 RX ORDER — ALPRAZOLAM 0.5 MG/1
0.5 TABLET ORAL ONCE
Status: COMPLETED | OUTPATIENT
Start: 2024-04-24 | End: 2024-04-24

## 2024-04-24 RX ADMIN — ALPRAZOLAM 0.5 MG: 0.5 TABLET ORAL at 09:04

## 2024-04-24 RX ADMIN — LABETALOL HYDROCHLORIDE 10 MG: 5 INJECTION, SOLUTION INTRAVENOUS at 08:04

## 2024-04-24 RX ADMIN — MAGNESIUM SULFATE HEPTAHYDRATE 2 G: 40 INJECTION, SOLUTION INTRAVENOUS at 04:04

## 2024-04-24 RX ADMIN — PIPERACILLIN SODIUM AND TAZOBACTAM SODIUM 4.5 G: 4; .5 INJECTION, POWDER, FOR SOLUTION INTRAVENOUS at 03:04

## 2024-04-24 RX ADMIN — PROPOFOL 20 MCG/KG/MIN: 10 INJECTION, EMULSION INTRAVENOUS at 04:04

## 2024-04-24 RX ADMIN — SODIUM CHLORIDE: 9 INJECTION, SOLUTION INTRAVENOUS at 08:04

## 2024-04-24 RX ADMIN — MAGNESIUM SULFATE HEPTAHYDRATE 2 G: 40 INJECTION, SOLUTION INTRAVENOUS at 08:04

## 2024-04-24 RX ADMIN — POTASSIUM BICARBONATE 40 MEQ: 391 TABLET, EFFERVESCENT ORAL at 01:04

## 2024-04-24 RX ADMIN — SODIUM CHLORIDE, POTASSIUM CHLORIDE, SODIUM LACTATE AND CALCIUM CHLORIDE 1000 ML: 600; 310; 30; 20 INJECTION, SOLUTION INTRAVENOUS at 01:04

## 2024-04-24 RX ADMIN — PIPERACILLIN SODIUM AND TAZOBACTAM SODIUM 4.5 G: 4; .5 INJECTION, POWDER, FOR SOLUTION INTRAVENOUS at 08:04

## 2024-04-24 RX ADMIN — HEPARIN SODIUM 12 UNITS/KG/HR: 10000 INJECTION, SOLUTION INTRAVENOUS at 12:04

## 2024-04-24 RX ADMIN — FAMOTIDINE 20 MG: 10 INJECTION, SOLUTION INTRAVENOUS at 08:04

## 2024-04-24 RX ADMIN — SODIUM CHLORIDE: 9 INJECTION, SOLUTION INTRAVENOUS at 12:04

## 2024-04-24 RX ADMIN — MUPIROCIN: 20 OINTMENT TOPICAL at 08:04

## 2024-04-24 RX ADMIN — PROPOFOL 30 MCG/KG/MIN: 10 INJECTION, EMULSION INTRAVENOUS at 12:04

## 2024-04-24 RX ADMIN — SODIUM CHLORIDE, POTASSIUM CHLORIDE, SODIUM LACTATE AND CALCIUM CHLORIDE: 600; 310; 30; 20 INJECTION, SOLUTION INTRAVENOUS at 02:04

## 2024-04-24 RX ADMIN — ASPIRIN 81 MG CHEWABLE TABLET 81 MG: 81 TABLET CHEWABLE at 08:04

## 2024-04-24 NOTE — INTERVAL H&P NOTE
The patient has been examined and the H&P has been reviewed:    I concur with the findings and no changes have occurred since H&P was written.    Anesthesia/Surgery risks, benefits and alternative options discussed and understood by patient/family.          Active Hospital Problems    Diagnosis  POA    *Cardiac arrest [I46.9]  Yes    Ventricular fibrillation [I49.01]  Yes    SLIME (acute kidney injury) [N17.9]  Yes    Severe sepsis [A41.9, R65.20]  Yes    Shock liver [K72.00]  Yes    On mechanically assisted ventilation [Z99.11]  Not Applicable    Hypokalemia [E87.6]  Yes    Lactic acidosis [E87.20]  Yes    Hypertension [I10]  Yes     Chronic    Hyperlipidemia [E78.5]  Yes     Chronic    History of palpitations [Z87.898]  Not Applicable     Chronic    NSTEMI (non-ST elevated myocardial infarction) [I21.4]  Yes      Resolved Hospital Problems   No resolved problems to display.

## 2024-04-24 NOTE — ASSESSMENT & PLAN NOTE
This patient does not have evidence of infective focus  My overall impression is sepsis.  Source: Unknown  Antibiotics given-   Antibiotics (72h ago, onward)      Start     Stop Route Frequency Ordered    04/24/24 0000  piperacillin-tazobactam (ZOSYN) 4.5 g in dextrose 5 % in water (D5W) 100 mL IVPB (MB+)         -- IV Every 8 hours (non-standard times) 04/23/24 2256    04/23/24 2330  mupirocin 2 % ointment         04/28/24 2059 Nasl 2 times daily 04/23/24 2224          Latest lactate reviewed-  Recent Labs   Lab 04/23/24  2337   LACTATE 1.4     Organ dysfunction indicated by Acute kidney injury and Acute liver injury    Fluid challenge not indicated.     Post- resuscitation assessment Yes Perfusion exam was performed within 6 hours of septic shock presentation after bolus shows Adequate tissue perfusion assessed by invasive monitoring     Suspect leukocytosis is reactive after cardiac arrest  Blood cultures, sputum culture pending  Will Not start Pressors- Levophed for MAP of 65  Source control achieved by: antibiotics

## 2024-04-24 NOTE — SUBJECTIVE & OBJECTIVE
Past Medical History:   Diagnosis Date    Diverticulitis     Hyperlipidemia     Hypertension     PVC's (premature ventricular contractions)        No past surgical history on file.    Review of patient's allergies indicates:  No Known Allergies    Family History    None       Tobacco Use    Smoking status: Never    Smokeless tobacco: Never   Substance and Sexual Activity    Alcohol use: Not on file    Drug use: Not on file    Sexual activity: Not on file         Review of Systems   Unable to perform ROS: Intubated     Objective:     Vital Signs (Most Recent):  Temp: (!) 94.1 °F (34.5 °C) (04/24/24 0000)  Pulse: 66 (04/24/24 0000)  Resp: 16 (04/24/24 0000)  BP: 105/65 (04/24/24 0000)  SpO2: 100 % (04/24/24 0000) Vital Signs (24h Range):  Temp:  [94.1 °F (34.5 °C)-98.2 °F (36.8 °C)] 94.1 °F (34.5 °C)  Pulse:  [58-99] 66  Resp:  [16-34] 16  SpO2:  [99 %-100 %] 100 %  BP: ()/() 105/65  Arterial Line BP: ()/(54-81) 106/64     Weight: 88.3 kg (194 lb 10.7 oz)  Body mass index is 28.75 kg/m².      Intake/Output Summary (Last 24 hours) at 4/24/2024 0015  Last data filed at 4/23/2024 2302  Gross per 24 hour   Intake 185.59 ml   Output 175 ml   Net 10.59 ml        Physical Exam  Vitals and nursing note reviewed.   Constitutional:       General: He is not in acute distress.     Appearance: He is overweight. He is not ill-appearing.      Interventions: He is sedated, intubated and restrained.   HENT:      Head: Normocephalic and atraumatic.      Mouth/Throat:      Lips: Pink.      Mouth: Mucous membranes are moist.   Eyes:      General: Lids are normal.      Conjunctiva/sclera: Conjunctivae normal.      Pupils: Pupils are equal, round, and reactive to light.      Right eye: Pupil is sluggish.      Left eye: Pupil is sluggish.   Neck:      Vascular: No JVD.   Cardiovascular:      Rate and Rhythm: Normal rate and regular rhythm.      Pulses:           Radial pulses are 2+ on the right side and 2+ on the left  side.        Dorsalis pedis pulses are 2+ on the right side and 2+ on the left side.      Heart sounds: Normal heart sounds, S1 normal and S2 normal.   Pulmonary:      Effort: No respiratory distress. He is intubated.      Breath sounds: Normal breath sounds and air entry.   Abdominal:      General: Bowel sounds are normal.      Palpations: Abdomen is soft.      Tenderness: There is no abdominal tenderness.   Musculoskeletal:      Cervical back: Neck supple.      Right lower leg: No edema.      Left lower leg: No edema.   Skin:     General: Skin is warm.      Capillary Refill: Capillary refill takes less than 2 seconds.   Neurological:      General: No focal deficit present.      Mental Status: He is lethargic.      GCS: GCS eye subscore is 4. GCS verbal subscore is 1. GCS motor subscore is 6.      Comments: Off of sedation-GCS 11T, +cough/gag, +corneal reflexes, +pupils, follows commands          Vents:  Vent Mode: A/C (04/23/24 2318)  Set Rate: 16 BPM (04/23/24 2318)  Vt Set: 450 mL (04/23/24 2318)  PEEP/CPAP: 5 cmH20 (04/23/24 2318)  Oxygen Concentration (%): 40 (04/24/24 0000)  Peak Airway Pressure: 18 cmH20 (04/23/24 2318)  Plateau Pressure: 17 cmH20 (04/23/24 2318)  Total Ve: 7.16 L/m (04/23/24 2318)  Negative Inspiratory Force (cm H2O): 0 (04/23/24 2318)  F/VT Ratio<105 (RSBI): (!) 35.71 (04/23/24 2318)    Lines/Drains/Airways       Drain  Duration                  NG/OG Tube 04/23/24 2025 16 Fr. Center mouth <1 day         Urethral Catheter 04/23/24 2150 Temperature probe;Silicone 16 Fr. <1 day              Airway  Duration                  Airway - Non-Surgical 04/23/24 2005 Endotracheal Tube <1 day              Arterial Line  Duration             Arterial Line 04/23/24 2227 Right Radial <1 day              Peripheral Intravenous Line  Duration                  Peripheral IV - Single Lumen 04/23/24 2010 18 G Right Antecubital <1 day         Peripheral IV - Single Lumen 04/23/24 2015 18 G Left Antecubital <1  day         Peripheral IV - Single Lumen 04/23/24 2037 18 G Anterior;Left Forearm <1 day         Peripheral IV - Single Lumen 04/23/24 2037 20 G Posterior;Right Hand <1 day                  Current Facility-Administered Medications   Medication Dose Route Frequency Last Rate Last Admin    fentanyl  0-250 mcg/hr Intravenous Continuous 2.5 mL/hr at 04/23/24 2300 25 mcg/hr at 04/23/24 2300    heparin (porcine) in D5W  0-40 Units/kg/hr Intravenous Continuous        NORepinephrine bitartrate-D5W  0-0.2 mcg/kg/min Intravenous Continuous 49.7 mL/hr at 04/23/24 2317 0.15 mcg/kg/min at 04/23/24 2317    propofoL  0-50 mcg/kg/min Intravenous Continuous 15.9 mL/hr at 04/24/24 0018 30 mcg/kg/min at 04/24/24 0018       Significant Labs:    CBC/Anemia Profile:  Recent Labs   Lab 04/23/24 2019 04/23/24 2238   WBC 7.64 15.27*   HGB 16.5 16.1   HCT 46.7 45.4    210   MCV 91 90   RDW 12.8 12.8        Chemistries:  Recent Labs   Lab 04/23/24  2019 04/23/24 2238     --    K 3.2*  --      --    CO2 20*  --    BUN 16  --    CREATININE 1.7*  --    CALCIUM 9.8  --    ALBUMIN 4.1  --    PROT 7.1  --    BILITOT 0.6  --    ALKPHOS 102  --    *  --    *  --    MG  --  2.2       All pertinent labs within the past 24 hours have been reviewed.    Significant Imaging:   I have reviewed all pertinent imaging results/findings within the past 24 hours.

## 2024-04-24 NOTE — PROCEDURES
"Milind Melgar is a 53 y.o. male patient.    Temp: 98 °F (36.7 °C) (04/23/24 2159)  Pulse: 67 (04/23/24 2300)  Resp: 17 (04/23/24 2300)  BP: 122/79 (04/23/24 2243)  SpO2: 100 % (04/23/24 2243)  Weight: 88.3 kg (194 lb 10.7 oz) (04/23/24 2300)  Height: 5' 9" (175.3 cm) (04/23/24 2300)       Arterial Line    Date/Time: 4/23/2024 10:10 PM  Location procedure was performed: [facility], [place] EMERGENCY DEPARTMENT    Performed by: Liana Crum NP  Authorized by: Liana Crum NP  Assisting provider: Elias Beyer RRT  Pre-op Diagnosis: cardiac arrest  Post-operative diagnosis: cardiac arrest  Consent Done: Not Needed  Preparation: Patient was prepped and draped in the usual sterile fashion.  Indications: multiple ABGs and hemodynamic monitoring  Location: right radial  Bunny's test normal: yes  Needle gauge: 20  Seldinger technique: Seldinger technique used  Number of attempts: 1  Complications: No  Specimens: No  Implants: No  Post-procedure: line sutured and dressing applied  Post-procedure CMS: normal  Patient tolerance: Patient tolerated the procedure well with no immediate complications  Comments: Dr. Lloyd available in vicinity for supervision and assistance.           4/23/2024    "

## 2024-04-24 NOTE — ASSESSMENT & PLAN NOTE
Patient is likely to have ischemic hepatitis as evidenced by abnormalities in AST and ALT. Etiology includes cardiogenic shock. Continue to monitor liver function while treating underlying condition. Daily labs to include CMP. Avoid hepatotoxins.    AST   Date Value Ref Range Status   04/24/2024 74 (H) 10 - 40 U/L Final     ALT   Date Value Ref Range Status   04/24/2024 102 (H) 10 - 44 U/L Final     4/24 monitor labs

## 2024-04-24 NOTE — CONSULTS
[facility] - Intensive Care ([facility])  Adult Nutrition  Consult Note    SUMMARY     Recommendations    Recommendation/Intervention:   1. Initiate pt onto continuous Enteral nutrition, recommend Peptamen Intense VHP via NG/OG tube, goal rate 70 mL/hr, starting at 10 mL/hr, then advance to goal rate within 24 hrs if pt is tolerating or per MD/NP   -Formula at goal rate provides: 1680 kcals/day (75% EEN), 155 g protein.day (104% EPN), 1411 mL free formula water/day   -135 mL q4h free water flushes (810 mL/day) = total from formula + FWF = 2221 mL water/day, per MD/NP   -Check Mg, K+, Na, Phos and Glu before and during initiation, correct as indicated   2. If PO diet warranted, recommend Cardiac diet, Texture per SLP recommendations   3. Weigh twice weekly    Goals:   1. Pt will be initiated onto continuous EN within 24 hrs   2. Pt will tolerate and intake 75% EEN and EPN prior to RD follow up  Nutrition Goal Status: new  Communication of RD Recs: other (comment) (POC, sticky note, secure chat)    Assessment and Plan    Nutrition Problem  Inadequate oral intake     Related to (etiology):   Decreased ability to consume sufficient protein/energy     Signs and Symptoms (as evidenced by):   Intubated, NPO     Interventions/Recommendations (treatment strategy):  1. Enteral nutrition   2. Decreased sodium and fat, possibly texture modified diet  3. Collaboration with other providers     Nutrition Diagnosis Status:   New       Malnutrition Assessment     Skin (Micronutrient): none (Bert score = 15 (mild risk)                                 Reason for Assessment    Reason For Assessment: consult, new tube feeding (intubated)  Diagnosis:  (Cardiac arrest)  Relevant Medical History: Ventricular fibrilliation, SLIME, Severe sepsis, Shock liver, On mechanically assisted ventilation, Hypokalemia, Lactic acidosis, HTN, HLD, NSTEMI  Hx: Chronic palpitations  General Information Comments:   4/24/24: 53 y.o. Male admitted for Cardiac  "arrest. Pt currently intubated, NPO in the ICU. RD consulted for TF recommendations d/t intubation. H&P noted that the pt presented to the ED via EMS after suffering cardiac arrest at home, pt last known well 5 minutes prior to discovery, pt was found collapsed, unresponsive, pulseless and apneic, CPR was initiated by family and continued by AASI x 3 minutes, ROSC was achieved after 1 defibrillation, pt arrived to ED with BVM ventilations in progress, pt family reported pt c/o of heart palpitations x 1 day PTA. Informed RN of TF recs via secure chat. Reviewed chart: Propofol: 0; Total ve: 17.8; LBM MEGHA; Skin: WDL; Bert score: 15 (mild risk); Edema: None. Labs, meds, weight reviewed. Weight charted 4/24 194 lbs (BMI 28.65, Overweight), no previous weights charted. NFPE to be performed at follow up when appropriate, pt in critical care, BMI >WNL. RD will continue to follow and monitor pt's nutritional status during admit.    Nutrition Discharge Planning: Cardiac diet    Nutrition Risk Screen    Nutrition Risk Screen: tube feeding or parenteral nutrition    Nutrition Related Social Determinants of Health: SDOH: Unable to assess at this time.       Nutrition/Diet History    Spiritual, Cultural Beliefs, Anabaptism Practices, Values that Affect Care: no  Food Allergies: NKFA  Factors Affecting Nutritional Intake: NPO, on mechanical ventilation    Anthropometrics    Temp: 99.3 °F (37.4 °C)  Height Method: Estimated  Height: 5' 9" (175.3 cm)  Height (inches): 69 in  Weight Method: Bed Scale  Weight: 88 kg (194 lb 0.1 oz)  Weight (lb): 194.01 lb  Ideal Body Weight (IBW), Male: 160 lb  % Ideal Body Weight, Male (lb): 121.26 %  BMI (Calculated): 28.6  BMI Grade: 25 - 29.9 - overweight     Wt Readings from Last 15 Encounters:   04/24/24 88 kg (194 lb 0.1 oz)     Lab/Procedures/Meds    Pertinent Labs Reviewed: reviewed  Pertinent Labs Comments: Na (L), Total protein (L), Anion gap (L), Glu (H), Cr (H), AST (H), ALT (H), " "Troponin (H), eGFR 7  Pertinent Medications Reviewed: reviewed  Pertinent Medications Comments: Zosyn in D5W, famotidine, aspirin  BMP  Lab Results   Component Value Date     (L) 04/24/2024    K 4.4 04/24/2024     04/24/2024    CO2 20 (L) 04/24/2024    BUN 18 04/24/2024    CREATININE 1.9 (H) 04/24/2024    CALCIUM 9.0 04/24/2024    ANIONGAP 7 (L) 04/24/2024    EGFRNORACEVR 42 (A) 04/24/2024     Lab Results   Component Value Date    ALBUMIN 3.5 04/24/2024     Recent Labs   Lab 04/24/24  1016   TROPONINI 3.761*     Lab Results   Component Value Date     (H) 04/24/2024    AST 74 (H) 04/24/2024    ALKPHOS 70 04/24/2024    BILITOT 1.0 04/24/2024     Lab Results   Component Value Date    CALCIUM 9.0 04/24/2024    PHOS 3.6 04/24/2024     Recent Labs   Lab 04/24/24  0139   POCTGLUCOSE 129*     No results found for: "LABA1C", "HGBA1C"    Lab Results   Component Value Date    WBC 10.72 04/24/2024    HGB 14.5 04/24/2024    HCT 41.0 04/24/2024    MCV 90 04/24/2024     04/24/2024     Scheduled Meds:  Current Facility-Administered Medications   Medication Dose Route Frequency    aspirin  81 mg Oral Daily    [START ON 4/25/2024] famotidine (PF)  20 mg Intravenous Daily    mupirocin   Nasal BID    piperacillin-tazobactam (Zosyn) IV (PEDS and ADULTS) (extended infusion is not appropriate)  4.5 g Intravenous Q8H     Continuous Infusions:  Current Facility-Administered Medications   Medication Dose Route Frequency Last Rate Last Admin    sodium chloride 0.9%   Intravenous Continuous 100 mL/hr at 04/24/24 1000 Rate Verify at 04/24/24 1000    heparin (porcine) in D5W  0-40 Units/kg/hr Intravenous Continuous 10.6 mL/hr at 04/24/24 1000 12.005 Units/kg/hr at 04/24/24 1000     PRN Meds:.  Current Facility-Administered Medications:     heparin (PORCINE), 45.3 Units/kg, Intravenous, PRN    heparin (PORCINE), 30 Units/kg, Intravenous, PRN    influenza, 0.5 mL, Intramuscular, vaccine x 1 dose    labetalol, 10 mg, " Intravenous, Q6H PRN    magnesium sulfate IVPB, 2 g, Intravenous, PRN    magnesium sulfate IVPB, 4 g, Intravenous, PRN    pneumoc 20-jeovanny conj-dip cr(PF), 0.5 mL, Intramuscular, vaccine x 1 dose    potassium chloride, 40 mEq, Intravenous, PRN **AND** potassium chloride, 60 mEq, Intravenous, PRN **AND** potassium chloride, 80 mEq, Intravenous, PRN    sodium chloride 0.9%, 10 mL, Intravenous, PRN        Physical Findings/Assessment         Estimated/Assessed Needs    Weight Used For Calorie Calculations: 88 kg (194 lb 0.1 oz)  Energy Calorie Requirements (kcal): 2235 kcals ([place] (Critical care-vent, Total ve: 17.8, BMI 28.65)  Energy Need Method: [facility]  Protein Requirements: 132-149 g (1.5-1.7 g/kg ABW (SLIME, critical care)  Weight Used For Protein Calculations: 88 kg (194 lb 0.1 oz)  Fluid Requirements (mL): 500 mL + total output (SLIME)  Estimated Fluid Requirement Method: other (see comments)  RDA Method (mL): 2235  CHO Requirement: 279 g (2235 kcals/8)      Nutrition Prescription Ordered    Current Diet Order: NPO    Evaluation of Received Nutrient/Fluid Intake  I/O: (Net since admit)  4/24: +3349.8 mL    Enteral Calories (kcal): 0  Enteral Protein (gm): 0  Enteral (Free Water) Fluid (mL): 0  Free Water Flush Fluid (mL): 0  % Kcal Needs: 0%  % Protein Needs: 0%    Energy Calories Required: not meeting needs  Protein Required: not meeting needs  Fluid Required: exceeds needs  Total Fluid Intake (mL): 3405.4  Tolerance:  (Currently no intake)  % Intake of Estimated Energy Needs: 0 - 25 %  % Meal Intake: NPO    Nutrition Risk    Level of Risk/Frequency of Follow-up: high (F/u x 2 weekly)       Monitor and Evaluation    Food and Nutrient Intake: energy intake, enteral nutrition intake  Food and Nutrient Adminstration: enteral and parenteral nutrition administration  Knowledge/Beliefs/Attitudes: food and nutrition knowledge/skill, beliefs and attitudes  Anthropometric Measurements: weight, weight change, body  mass index  Biochemical Data, Medical Tests and Procedures: electrolyte and renal panel, glucose/endocrine profile       Nutrition Follow-Up    RD Follow-up?: Yes  Kamala Franz, Registration Eligible, Provisional LDN

## 2024-04-24 NOTE — PROGRESS NOTES
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 Per family, patient was found collapsed, unresponsive, pulseless and apneic. Last known well was 5 minutes prior to discovery. CPR was initiated by family and continued by [facility] for 3 minutes. His initial rhythm was pulseless ventricular tachycardia. ROSC was achieved after 1 defibrillation. EMS administered Ketamine in attempt to secure definitive airway placement without success and he arrived to ED with BVM ventilations in progress.     Family reports that yesterday patient complained of feeling palpitations impending before lunch, then at noon he  reported feeling them. He denied chest pain/discomfort, shortness of breath, syncope, dizziness at that time. Chart reveals cardiology visit in January this year for palpitations with 10 day Holter monitor showing no arrhythmias other than PVC's with 8% burden. Patient was changed from losartan to acebutolol in January, but took himself off of his medications approx 2 weeks later. He has also donated blood 2x since January, last being 2-3 weeks ago. Unable to obtain remainder of ROS due to patient intubated.     ED course revealed EKG with R axis deviation, QTc 437. CBC shows WBC 15.27, likely reactive. Chemistry w/K 3.2, CO2 20, BUN/Cr 16/1.7, glucose 195, , . Troponin 0.112, BNP <10. Procalcitonin negative. UDS negative. Lactic acid 3.8. CXR w/no infiltrates,  opacities, vascular congestion, or effusions. Patient is intubated, sedated, and on mechanical ventilation. CT head negative.     Cardiology consulted and recommended heparin protocol and if any recurrence of NSVT to start amiodarone.     Critical care was consulted for admission and management.         Hospital/ICU Course:  4/24 placed on PS this am ,wide awake and now extubated   Cath lab     Interval History: placed on PS and extubated this am   Went to cath lab as well       Objective:     Vital Signs (Most Recent):  Temp: 99.5 °F (37.5 °C) (04/24/24 1130)  Pulse: 72 (04/24/24 1415)  Resp: 10 (04/24/24 1415)  BP: (!) 140/69 (04/24/24 1330)  SpO2: 99 % (04/24/24 1415) Vital Signs (24h Range):  Temp:  [94.1 °F (34.5 °C)-99.5 °F (37.5 °C)] 99.5 °F (37.5 °C)  Pulse:  [58-99] 72  Resp:  [10-34] 10  SpO2:  [94 %-100 %] 99 %  BP: ()/() 140/69  Arterial Line BP: ()/(52-92) 141/70     Weight: 88 kg (194 lb 0.1 oz)  Body mass index is 28.65 kg/m².      Intake/Output Summary (Last 24 hours) at 4/24/2024 1426  Last data filed at 4/24/2024 0831  Gross per 24 hour   Intake 3599.77 ml   Output 250 ml   Net 3349.77 ml        Physical Exam  Vitals and nursing note reviewed.   Constitutional:       General: He is not in acute distress.  HENT:      Head: Normocephalic and atraumatic.   Eyes:      General:         Right eye: No discharge.         Left eye: No discharge.   Cardiovascular:      Rate and Rhythm: Normal rate and regular rhythm.      Heart sounds: No murmur heard.  Pulmonary:      Effort: No respiratory distress.      Breath sounds: No wheezing or rales.   Abdominal:      General: There is no distension.   Musculoskeletal:         General: No swelling or tenderness.      Cervical back: Neck supple.   Neurological:      General: No focal deficit present.      Mental Status: He is alert and oriented to person, place, and time.           Review of Systems   Reason unable to perform ROS: initially on vent.    Constitutional: Negative.    HENT: Negative.     Respiratory: Negative.     Cardiovascular: Negative.    Gastrointestinal: Negative.    Genitourinary: Negative.    Musculoskeletal: Negative.    Neurological: Negative.        Vents:  Vent Mode: (S) Spont (04/24/24 0729)  Set Rate: 18 BPM (04/24/24 0518)  Vt Set: 450 mL (04/24/24 0518)  Pressure Support: (S) 7 cmH20 (04/24/24 0729)  PEEP/CPAP: 5 cmH20 (04/24/24 0729)  Oxygen Concentration (%): (S) 32 (04/24/24 0824)  Peak Airway Pressure: 12 cmH20 (04/24/24 0729)  Plateau Pressure: 18 cmH20 (04/24/24 0729)  Total Ve: 8.24 L/m (04/24/24 0729)  Negative Inspiratory Force (cm H2O): 0 (04/24/24 0729)  F/VT Ratio<105 (RSBI): (!) 29.82 (04/24/24 0729)    Lines/Drains/Airways       Drain  Duration                  Urethral Catheter 04/23/24 2150 Temperature probe;Silicone 16 Fr. <1 day              Arterial Line  Duration             Arterial Line 04/23/24 2227 Right Radial <1 day              Peripheral Intravenous Line  Duration                  Peripheral IV - Single Lumen 04/23/24 2010 18 G Right Antecubital <1 day         Peripheral IV - Single Lumen 04/23/24 2015 18 G Left Antecubital <1 day         Peripheral IV - Single Lumen 04/23/24 2037 18 G Anterior;Left Forearm <1 day         Peripheral IV - Single Lumen 04/23/24 2037 20 G Posterior;Right Hand <1 day         Peripheral IV - Single Lumen 04/24/24 20 G Anterior;Right Forearm <1 day                    Significant Labs:    CBC/Anemia Profile:  Recent Labs   Lab 04/23/24 2019 04/23/24 2238 04/24/24 0336   WBC 7.64 15.27* 10.72   HGB 16.5 16.1 14.5   HCT 46.7 45.4 41.0    210 210   MCV 91 90 90   RDW 12.8 12.8 12.7        Chemistries:  Recent Labs   Lab 04/23/24 2019 04/23/24 2238 04/24/24  0336     --  135*   K 3.2*  --  4.4     --  108   CO2 20*  --  20*   BUN 16  --  18   CREATININE 1.7*  --  1.9*   CALCIUM 9.8  --  9.0   ALBUMIN 4.1  --  3.5   PROT 7.1  --  5.9*   BILITOT 0.6  --  1.0    ALKPHOS 102  --  70   *  --  102*   *  --  74*   MG  --  2.2 1.8   PHOS  --   --  3.6       All pertinent labs within the past 24 hours have been reviewed.    Significant Imaging:  I have reviewed all pertinent imaging results/findings within the past 24 hours.    ABG  Recent Labs   Lab 04/24/24  0339   PH 7.326*   PO2 163*   PCO2 45.2*   HCO3 23.6*   BE -2     Assessment/Plan:     Pulmonary  On mechanically assisted ventilation  - vent bundle in place  - VAP prophylaxis  - wean settings as tolerated  - prn ABG  - SAT/SBT daily    4/24 off vent now   Extubated this am     Cardiac/Vascular  * Cardiac arrest  - etiology unclear  - suspect arrhythmia event  - echo ordered  - sedation vacation revealed patient following commands, therefore no TTM initiated. \    4/24- woke up this am   Heart cath done, nonobstructive disease \  EP consulted   Plan per cards        NSTEMI (non-ST elevated myocardial infarction)  - troponin 0.112->6.820  - likely 2/2 demand/ischemia following cardiac arrest  - heparin gtt  - lipid panel, A1c ordered      History of palpitations  - cardiology consulted  - further work up when stable  - ?ICD    Hyperlipidemia  - checking lipids  - resume statin when as LFT's allow  - monitor LFT's      Hypertension  - holding medications acutely  - patient was on acebutolol, took himself off 6-8 wks ago  - add meds as needed    4/24 prn meds as needed  May need oral meds     Ventricular fibrillation  - ?R on T or V-tach prior to arrest  - cardiology consulted and appreciate recs  - amio if VT   - monitor and replace electrolytes prn    4/24 see cardiac arrest       Renal/  Lactic acidosis  - resolved  - suspect 2/2 arrest event    Hypokalemia  Patient has hypokalemia which is Acute and currently uncontrolled. Most recent potassium levels reviewed-   Lab Results   Component Value Date    K 3.2 (L) 04/23/2024     - Will continue potassium replacement per protocol and recheck repeat levels  after replacement completed  - check mag    SLIME (acute kidney injury)  Patient with acute kidney injury/acute renal failure likely due to low flow state/cardiac arrest. Baseline creatinine unknown - Labs reviewed- Renal function/electrolytes with Estimated Creatinine Clearance: 49.9 mL/min (A) (based on SCr of 1.9 mg/dL (H)). according to latest data. Monitor urine output and serial BMP and adjust therapy as needed. Avoid nephrotoxins and renally dose meds for GFR listed above.    4/24 monitor     GI  Shock liver  Patient is likely to have ischemic hepatitis as evidenced by abnormalities in AST and ALT. Etiology includes cardiogenic shock. Continue to monitor liver function while treating underlying condition. Daily labs to include CMP. Avoid hepatotoxins.    AST   Date Value Ref Range Status   04/24/2024 74 (H) 10 - 40 U/L Final     ALT   Date Value Ref Range Status   04/24/2024 102 (H) 10 - 44 U/L Final     4/24 monitor labs         Critical Care Time: 34 minutes  Critical secondary to Patient has a condition that poses threat to life and bodily function: weaning from vent/ this am on levo /vent/ sedation       Critical care was time spent personally by me on the following activities: development of treatment plan with patient or surrogate and bedside caregivers, discussions with consultants, evaluation of patient's response to treatment, examination of patient, ordering and performing treatments and interventions, ordering and review of laboratory studies, ordering and review of radiographic studies, pulse oximetry, re-evaluation of patient's condition. This critical care time did not overlap with that of any other provider or involve time for any procedures.     Elvia Lloyd MD  Critical Care Medicine  '[place] - Intensive Care ([facility])

## 2024-04-24 NOTE — CONSULTS
"Subjective:   Patient ID:  Milind Melgar is a 53 y.o. male who presents for evaluation of Cardiac Arrest (Pt to ER via [facility] for evaluation s/p cardiac arrest; initial called received at 1910; when [facility] arrived pt was pulseless with CPR in progress; intitial EKG read V Tach; pt received (1) shock and had ROSC; pt also given 3ml of Ketamine to attempt airway placed; definitive airway placement prior to arrival not successful; palpable pulse noted upon arrival; pt being bagged; family reports pt recently c/o "palpitations")      HPI    Milind Melgar is a 53 year old male who presents to [facility] due to cardiac arrest at home s/p defibrillation and ROSC obtained.     Patient has history of palpitations for the past year or more. He was seen by Dr Philip in 2023 with extended monitor which revealed 8% PVCs and was started on acebutolol. He weaned himself off BB in January of this year with no recurrence of palps until yesterday.    Yesterday AM- he had palps in AM and around lunch then noted improvement prior to worsening palps which then resulted in Cardiac Arrest.     He underwent LHC today which revealed nonobstructive CAD, normal EF.     Patient and family at bedside with long conversation. He has been completing Cross Fit recently 1 hour sessions for 5 days per week which he feels he is pushing past his max capacity lately.     Plan for Cardiac MRI for further evaluation. We need to obtain raw data from Extended monitor to assess if any need for ablation in near future. Will plan to implant ICD VDX this admission for secondary prevention.       Past Medical History:   Diagnosis Date    Diverticulitis     Hyperlipidemia     Hypertension     PVC's (premature ventricular contractions)        No past surgical history on file.    Social History     Tobacco Use    Smoking status: Never    Smokeless tobacco: Never       No family history on file.    Wt Readings from Last 3 Encounters:   04/24/24 88 kg (194 lb 0.1 oz) "     Temp Readings from Last 3 Encounters:   04/24/24 99.5 °F (37.5 °C)     BP Readings from Last 3 Encounters:   04/24/24 (!) 140/69     Pulse Readings from Last 3 Encounters:   04/24/24 72       No current facility-administered medications on file prior to encounter.     No current outpatient medications on file prior to encounter.       No cardiac monitor results found for the past 12 months    Results for orders placed during the hospital encounter of 04/23/24    Echo    Interpretation Summary    Left Ventricle: The left ventricle is normal in size. Normal wall thickness. There is concentric remodeling. There is normal systolic function with a visually estimated ejection fraction of 60 - 65%. Grade II diastolic dysfunction. Elevated left ventricular filling pressure.    Right Ventricle: Right ventricle was not well visualized due to poor acoustic window. Right ventricle wall motion  is unable to be assessed. Systolic function could not be assesed.    Aortic Valve: Aortic valve peak velocity is 1.72 m/s. Mean gradient is 7 mmHg.    Mitral Valve: There is mild regurgitation.    IVC/SVC: Patient is ventilated, cannot use IVC diameter to estimate right atrial pressure.    No results found for this or any previous visit.        Results for orders placed or performed during the hospital encounter of 04/23/24   EKG 12-lead    Collection Time: 04/24/24  5:38 AM   Result Value Ref Range    QRS Duration 106 ms    OHS QTC Calculation 392 ms    Narrative    Test Reason : I46.9,    Vent. Rate : 063 BPM     Atrial Rate : 063 BPM     P-R Int : 168 ms          QRS Dur : 106 ms      QT Int : 384 ms       P-R-T Axes : 056 090 085 degrees     QTc Int : 392 ms    Normal sinus rhythm  Rightward axis  Borderline Abnormal ECG  When compared with ECG of 23-APR-2024 20:09,  Nonspecific T wave abnormality no longer evident in Lateral leads    Referred By: [email or web address]   SELF           Confirmed By:          Review of Systems  "  Constitutional: Positive for malaise/fatigue.   HENT:  Negative for hearing loss and hoarse voice.    Eyes:  Negative for blurred vision and visual disturbance.   Cardiovascular:  Positive for palpitations. Negative for chest pain, claudication, dyspnea on exertion, irregular heartbeat, leg swelling, near-syncope, orthopnea, paroxysmal nocturnal dyspnea and syncope.   Respiratory:  Negative for cough, hemoptysis, shortness of breath, sleep disturbances due to breathing, snoring and wheezing.    Endocrine: Negative for cold intolerance and heat intolerance.   Hematologic/Lymphatic: Does not bruise/bleed easily.   Skin:  Negative for color change, dry skin and nail changes.   Musculoskeletal:  Positive for arthritis and back pain. Negative for joint pain and myalgias.   Gastrointestinal:  Negative for bloating, abdominal pain, constipation, nausea and vomiting.   Genitourinary:  Negative for dysuria, flank pain, hematuria and hesitancy.   Neurological:  Negative for headaches, light-headedness, loss of balance, numbness, paresthesias and weakness.   Psychiatric/Behavioral:  Negative for altered mental status.    Allergic/Immunologic: Negative for environmental allergies.         Objective:BP (!) 140/69   Pulse 79   Temp 99.5 °F (37.5 °C)   Resp 12   Ht 5' 9" (1.753 m)   Wt 88 kg (194 lb 0.1 oz)   SpO2 98%   BMI 28.65 kg/m²      Physical Exam  Vitals and nursing note reviewed.   Constitutional:       General: He is not in acute distress.     Appearance: Normal appearance. He is well-developed. He is not ill-appearing.   HENT:      Head: Normocephalic and atraumatic.      Nose: Nose normal.      Mouth/Throat:      Mouth: Mucous membranes are moist.   Eyes:      Pupils: Pupils are equal, round, and reactive to light.   Neck:      Thyroid: No thyromegaly.      Vascular: JVD present.      Trachea: No tracheal deviation.   Cardiovascular:      Rate and Rhythm: Normal rate and regular rhythm. Frequent Extrasystoles " are present.     Chest Wall: PMI is not displaced.      Pulses: Intact distal pulses.           Radial pulses are 2+ on the right side and 2+ on the left side.        Dorsalis pedis pulses are 2+ on the right side and 2+ on the left side.      Heart sounds: S1 normal and S2 normal. Heart sounds not distant. No murmur heard.  Pulmonary:      Effort: Pulmonary effort is normal. No respiratory distress.      Breath sounds: Normal breath sounds. No wheezing.   Abdominal:      General: Bowel sounds are normal. There is no distension.      Palpations: Abdomen is soft.      Tenderness: There is no abdominal tenderness.   Musculoskeletal:         General: No swelling. Normal range of motion.      Cervical back: Full passive range of motion without pain, normal range of motion and neck supple.      Right lower leg: No edema.      Left lower leg: No edema.      Right ankle: No swelling.      Left ankle: No swelling.   Skin:     General: Skin is warm and dry.      Capillary Refill: Capillary refill takes less than 2 seconds.      Nails: There is no clubbing.   Neurological:      General: No focal deficit present.      Mental Status: He is alert and oriented to person, place, and time.      Motor: No weakness.   Psychiatric:         Speech: Speech normal.         Behavior: Behavior normal.         Thought Content: Thought content normal.         Judgment: Judgment normal.         Lab Results   Component Value Date    CHOL 181 04/24/2024     Lab Results   Component Value Date    HDL 42 04/24/2024     Lab Results   Component Value Date    LDLCALC 109.6 04/24/2024     Lab Results   Component Value Date    TRIG 147 04/24/2024     Lab Results   Component Value Date    CHOLHDL 23.2 04/24/2024     [unfilled]  Lab Results   Component Value Date    TSH 1.607 04/24/2024     Lab Results   Component Value Date    INR 0.9 04/23/2024     Lab Results   Component Value Date    WBC 10.72 04/24/2024    HGB 14.5 04/24/2024    HCT 41.0 04/24/2024     MCV 90 04/24/2024     04/24/2024     BNP  Recent Labs   Lab 04/23/24 2027   BNP <10     Estimated Creatinine Clearance: 49.9 mL/min (A) (based on SCr of 1.9 mg/dL (H)).     Assessment:      1. Cardiac arrest    2. V-tach    3. SLIME (acute kidney injury)    4. NSTEMI (non-ST elevated myocardial infarction)        Plan:   Cardiac Arrest, s/p resuscitation     -non-obstructive CAD per [facility] today  -ECHO pending  -Needs ICD VDX this admission  -Cardiac MRI asap  -Obtain raw data of extended monitor from Dr Philip's office ASAP  -Keep K+>4 and Mag >2  -Further recs to follow after review of monitor data      JACQUELINE Agrawal-C [facility]

## 2024-04-24 NOTE — ED PROVIDER NOTES
"SCRIBE #1 NOTE: I, Segnudo Smith, am scribing for, and in the presence of, Aurora Ruiz MD. I have scribed the entire note.       History     Chief Complaint   Patient presents with    Cardiac Arrest     Pt to ER via [facility] for evaluation s/p cardiac arrest; initial called received at 1910; when [facility] arrived pt was pulseless with CPR in progress; intitial EKG read V Tach; pt received (1) shock and had ROSC; pt also given 3ml of Ketamine to attempt airway placed; definitive airway placement prior to arrival not successful; palpable pulse noted upon arrival; pt being bagged; family reports pt recently c/o "palpitations"     Review of patient's allergies indicates:  No Known Allergies      History of Present Illness     HPI    4/23/2024, 8:16 PM  History obtained from the  [facility] and patient's family as well as Dr. Carmichael (Radiation Oncology)  HPI, ROS, and PE limited as pt is intubated      History of Present Illness: Milind Melgar is a 53 y.o. male patient with who presents to the Emergency Department for evaluation of s/p cardiac arrest. Initial call received at 19:10, as family found pt collapsed and not breathing after leaving the room for 5 min. [facility] arrived with pt pulseless with CPR in progress by family. [facility] continued CPR for 3 min. Initial EKG reading V tach, and pt received one shock and had ROSC. Pt was also given 3ml ketamine to attempt airway placement. Definitive airway placement pta was unsuccessful. Pt was breathing on his own en route. Upon arrival, palpable pulse noted, and pt was bagged. Pt's family reports he has been recently c/o palpitations. Pt intubated due to AMS. [facility] reports pt smokes marijuana with no pinpoint pupils noted. No further complaints or concerns at this time.     Called by Dr. Maya (Radiation Oncology) prior to pt's arrival. He states pt is a friend of his and has been c/o CP. 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     Arrival mode: [facility]    PCP: ANNE Ford Jr., NP        Past Medical History:  Past Medical History:   Diagnosis Date    Diverticulitis     Hyperlipidemia     Hypertension     PVC's (premature ventricular contractions)        Past Surgical History:  Past Surgical History:   Procedure Laterality Date    CORONARY ANGIOGRAPHY N/A 4/24/2024    Procedure: ANGIOGRAM, CORONARY ARTERY;  Surgeon: Fitz Ochoa MD;  Location: [facility] CATH LAB;  Service: Cardiology;  Laterality: N/A;    LEFT HEART CATHETERIZATION Left 4/24/2024    Procedure: Left heart cath;  Surgeon: Fitz Ochoa MD;  Location: [facility] CATH LAB;  Service: Cardiology;  Laterality: Left;    VENTRICULOGRAM, LEFT  4/24/2024    Procedure: Ventriculogram, Left;  Surgeon: Fitz Ochoa MD;  Location: [facility] CATH LAB;  Service: Cardiology;;         Family History:  No family history on file.    Social History:  Social History     Tobacco Use    Smoking status: Never    Smokeless tobacco: Never   Substance and Sexual Activity    Alcohol use: Not on file    Drug use: Not on file    Sexual activity: Not on file        Review of Systems     Review of Systems   Unable to perform ROS: Intubated   Cardiovascular:  Positive for chest pain and palpitations.      Physical Exam     Initial Vitals   BP Pulse Resp Temp SpO2   04/23/24 2012 04/23/24 2012 04/23/24 2012 04/23/24 2159 04/23/24 2012   (!) 161/106 85 17 98 °F (36.7 °C) 100 %      MAP       --                 Physical Exam  Nursing Notes and Vital Signs Reviewed.  Constitutional: Pt arrived unresponsive s/p cardiac arrest. Pt intubated with + gag reflex.  Head: Atraumatic. Normocephalic.  Eyes: Pupils reactive 3mm bilaterally. EOM intact. Conjunctivae are not pale. No scleral icterus.  ENT: Mucous membranes are moist. Oropharynx is clear and symmetric.    Neck: Supple. Full ROM. No lymphadenopathy.  Cardiovascular: Regular rate. Regular rhythm. No murmurs, rubs, or gallops. Distal pulses are 2+  and symmetric.  Pulmonary/Chest: Pt bagged on arrival.  Abdominal: Soft and non-distended.  There is no tenderness.  No rebound, guarding, or rigidity. Good bowel sounds.  Genitourinary: No CVA tenderness  Musculoskeletal: Moves all extremities. No obvious deformities. No edema. No calf tenderness.  Skin: Warm and dry.  Neurological:  GCS 3. No acute focal neurological defects noted.   Psychiatric: Pt intubated.      ED Course   Intubation    Date/Time: 4/23/2024 8:35 PM  Location procedure was performed: [facility] EMERGENCY DEPARTMENT    Performed by: Aurora Ruiz MD  Authorized by: Aurora Ruiz MD  Consent Done: Emergent Situation  Indications: respiratory failure and respiratory distress  Intubation method: direct  Patient status: paralyzed (RSI)  Preoxygenation: BVM  Sedatives: ketmine  Paralytic: succinylcholine  Laryngoscope size: Mac 4  Tube size: 8.0 mm  Tube type: cuffed  Number of attempts: 2  Ventilation between attempts: BVM  Cricoid pressure: yes  Cords visualized: yes  Post-procedure assessment: ETCO2 monitor and CO2 detector  Breath sounds: clear  Cuff inflated: yes  ETT to lip: 24 cm  Chest x-ray interpreted by me.  Chest x-ray findings: endotracheal tube in appropriate position  Patient tolerance: Patient tolerated the procedure well with no immediate complications  Complications: No      Critical Care    Date/Time: 4/23/2024 9:01 PM    Performed by: Aurora Ruiz MD  Authorized by: Aurora Ruiz MD  Direct patient critical care time: 17 minutes  Additional history critical care time: 7 minutes  Ordering / reviewing critical care time: 5 minutes  Documentation critical care time: 7 minutes  Consulting other physicians critical care time: 4 minutes  Consult with family critical care time: 3 minutes  Other critical care time: 2 minutes  Total critical care time (exclusive of procedural time) : 45 minutes  Critical care time was exclusive of separately billable procedures and  treating other patients and teaching time.  Critical care was necessary to treat or prevent imminent or life-threatening deterioration of the following conditions: cardiac failure and respiratory failure.  Critical care was time spent personally by me on the following activities: blood draw for specimens, development of treatment plan with patient or surrogate, discussions with consultants, interpretation of cardiac output measurements, review of old charts, re-evaluation of patient's condition, pulse oximetry, ordering and review of radiographic studies, ordering and performing treatments and interventions, obtaining history from patient or surrogate, examination of patient, evaluation of patient's response to treatment and ordering and review of laboratory studies.        ED Vital Signs:  Vitals:    04/24/24 1115 04/24/24 1130 04/24/24 1230 04/24/24 1245   BP:   (!) 141/69 136/67   Pulse: 86 82 82 75   Resp: (!) 24 14 11 14   Temp: 98.8 °F (37.1 °C) 99.5 °F (37.5 °C)     TempSrc:       SpO2: 100% 100% 96% (!) 94%   Weight:       Height:        04/24/24 1300 04/24/24 1315 04/24/24 1330 04/24/24 1415   BP: 139/69 (!) 143/72 (!) 140/69    Pulse: 74 73 76 72   Resp: 15 14 17 10   Temp:       TempSrc:       SpO2: (!) 94% 100% 96% 99%   Weight:       Height:        04/24/24 1430 04/24/24 1445 04/24/24 1500 04/24/24 1515   BP:       Pulse: 79 84 82 81   Resp: 12 (!) 23 16 15   Temp:    97.8 °F (36.6 °C)   TempSrc:    Oral   SpO2: 98% 99% 99% 99%   Weight:       Height:        04/24/24 1530 04/24/24 1545 04/24/24 1600   BP:      Pulse: 86 76 75   Resp: 19 (!) 9 (!) 9   Temp:      TempSrc:      SpO2: 99% 98% 95%   Weight:      Height:          Abnormal Lab Results:  Labs Reviewed   CBC W/ AUTO DIFFERENTIAL - Abnormal; Notable for the following components:       Result Value    MCH 32.0 (*)     Immature Granulocytes 0.8 (*)     Immature Grans (Abs) 0.06 (*)     All other components within normal limits   COMPREHENSIVE  METABOLIC PANEL - Abnormal; Notable for the following components:    Potassium 3.2 (*)     CO2 20 (*)     Glucose 195 (*)     Creatinine 1.7 (*)      (*)      (*)     eGFR 48 (*)     All other components within normal limits   LACTIC ACID, PLASMA - Abnormal; Notable for the following components:    Lactate (Lactic Acid) 3.8 (*)     All other components within normal limits    Narrative:     Lactic critical result(s) called and verbal readback obtained from   Jasmina Desouza RN by JERMAINE 04/23/2024 21:40   URINALYSIS, REFLEX TO URINE CULTURE - Abnormal; Notable for the following components:    Protein, UA 3+ (*)     Glucose, UA 1+ (*)     Occult Blood UA 1+ (*)     All other components within normal limits    Narrative:     Specimen Source->Urine   TROPONIN I - Abnormal; Notable for the following components:    Troponin I 0.112 (*)     All other components within normal limits   URINALYSIS MICROSCOPIC - Abnormal; Notable for the following components:    RBC, UA 35 (*)     WBC, UA 7 (*)     All other components within normal limits    Narrative:     Specimen Source->Urine   CBC W/ AUTO DIFFERENTIAL - Abnormal; Notable for the following components:    WBC 15.27 (*)     MCH 31.9 (*)     Gran # (ANC) 12.7 (*)     Immature Grans (Abs) 0.07 (*)     Mono # 1.1 (*)     Gran % 83.1 (*)     Lymph % 8.6 (*)     All other components within normal limits    Narrative:     Draw baseline aPTT prior to starting the heparin bolus or  infusion  (if patient is on warfarin prior to heparin therapy)   ISTAT PROCEDURE - Abnormal; Notable for the following components:    POC PH 7.345 (*)     POC PO2 508 (*)     POC HCO3 23.2 (*)     All other components within normal limits   B-TYPE NATRIURETIC PEPTIDE   PROCALCITONIN   DRUG SCREEN PANEL, URINE EMERGENCY    Narrative:     Specimen Source->Urine        All Lab Results:  Results for orders placed or performed during the hospital encounter of 04/23/24   Blood culture x two cultures.  Draw prior to antibiotics.    Specimen: Peripheral, Antecubital, Left; Blood   Result Value Ref Range    Blood Culture, Routine No Growth to date    Blood culture x two cultures. Draw prior to antibiotics.    Specimen: Peripheral, Antecubital, Right; Blood   Result Value Ref Range    Blood Culture, Routine No Growth to date    Culture, Respiratory with Gram Stain    Specimen: Endotracheal Aspirate; Respiratory   Result Value Ref Range    Gram Stain (Respiratory) <10 epithelial cells per low power field.     Gram Stain (Respiratory) Rare WBC's     Gram Stain (Respiratory) Rare Gram positive cocci    CBC auto differential   Result Value Ref Range    WBC 7.64 3.90 - 12.70 K/uL    RBC 5.16 4.60 - 6.20 M/uL    Hemoglobin 16.5 14.0 - 18.0 g/dL    Hematocrit 46.7 40.0 - 54.0 %    MCV 91 82 - 98 fL    MCH 32.0 (H) 27.0 - 31.0 pg    MCHC 35.3 32.0 - 36.0 g/dL    RDW 12.8 11.5 - 14.5 %    Platelets 237 150 - 450 K/uL    MPV 10.5 9.2 - 12.9 fL    Immature Granulocytes 0.8 (H) 0.0 - 0.5 %    Gran # (ANC) 3.4 1.8 - 7.7 K/uL    Immature Grans (Abs) 0.06 (H) 0.00 - 0.04 K/uL    Lymph # 3.4 1.0 - 4.8 K/uL    Mono # 0.6 0.3 - 1.0 K/uL    Eos # 0.0 0.0 - 0.5 K/uL    Baso # 0.07 0.00 - 0.20 K/uL    nRBC 0 0 /100 WBC    Gran % 45.0 38.0 - 73.0 %    Lymph % 44.9 18.0 - 48.0 %    Mono % 7.9 4.0 - 15.0 %    Eosinophil % 0.5 0.0 - 8.0 %    Basophil % 0.9 0.0 - 1.9 %    Differential Method Automated    Comprehensive metabolic panel   Result Value Ref Range    Sodium 138 136 - 145 mmol/L    Potassium 3.2 (L) 3.5 - 5.1 mmol/L    Chloride 106 95 - 110 mmol/L    CO2 20 (L) 23 - 29 mmol/L    Glucose 195 (H) 70 - 110 mg/dL    BUN 16 6 - 20 mg/dL    Creatinine 1.7 (H) 0.5 - 1.4 mg/dL    Calcium 9.8 8.7 - 10.5 mg/dL    Total Protein 7.1 6.0 - 8.4 g/dL    Albumin 4.1 3.5 - 5.2 g/dL    Total Bilirubin 0.6 0.1 - 1.0 mg/dL    Alkaline Phosphatase 102 55 - 135 U/L     (H) 10 - 40 U/L     (H) 10 - 44 U/L    eGFR 48 (A) >60 mL/min/1.73 m^2     Anion Gap 12 8 - 16 mmol/L   Lactic acid, plasma #1   Result Value Ref Range    Lactate (Lactic Acid) 3.8 (HH) 0.5 - 2.2 mmol/L   Urinalysis, Reflex to Urine Culture Urine, Clean Catch    Specimen: Urine   Result Value Ref Range    Specimen UA Urine, Clean Catch     Color, UA Yellow Yellow, Straw, Chari    Appearance, UA Clear Clear    pH, UA 7.0 5.0 - 8.0    Specific Gravity, UA 1.010 1.005 - 1.030    Protein, UA 3+ (A) Negative    Glucose, UA 1+ (A) Negative    Ketones, UA Negative Negative    Bilirubin (UA) Negative Negative    Occult Blood UA 1+ (A) Negative    Nitrite, UA Negative Negative    Urobilinogen, UA Negative <2.0 EU/dL    Leukocytes, UA Negative Negative   Brain natriuretic peptide   Result Value Ref Range    BNP <10 0 - 99 pg/mL   Troponin I   Result Value Ref Range    Troponin I 0.112 (H) 0.000 - 0.026 ng/mL   Procalcitonin   Result Value Ref Range    Procalcitonin <0.02 <0.25 ng/mL   Drug screen panel, emergency   Result Value Ref Range    Benzodiazepines Negative Negative    Methadone metabolites Negative Negative    Cocaine (Metab.) Negative Negative    Opiate Scrn, Ur Negative Negative    Barbiturate Screen, Ur Negative Negative    Amphetamine Screen, Ur Negative Negative    THC Negative Negative    Phencyclidine Negative Negative    Creatinine, Urine 30.7 23.0 - 375.0 mg/dL    Toxicology Information SEE COMMENT    Lactic acid, plasma #2   Result Value Ref Range    Lactate (Lactic Acid) 1.4 0.5 - 2.2 mmol/L   Urinalysis Microscopic   Result Value Ref Range    RBC, UA 35 (H) 0 - 4 /hpf    WBC, UA 7 (H) 0 - 5 /hpf    Bacteria Occasional None-Occ /hpf    Hyaline Casts, UA 0 0-1/lpf /lpf    Microscopic Comment SEE COMMENT    APTT   Result Value Ref Range    aPTT 26.1 21.0 - 32.0 sec   Protime-INR   Result Value Ref Range    Prothrombin Time 10.7 9.0 - 12.5 sec    INR 0.9 0.8 - 1.2   CBC auto differential   Result Value Ref Range    WBC 15.27 (H) 3.90 - 12.70 K/uL    RBC 5.04 4.60 - 6.20 M/uL     Hemoglobin 16.1 14.0 - 18.0 g/dL    Hematocrit 45.4 40.0 - 54.0 %    MCV 90 82 - 98 fL    MCH 31.9 (H) 27.0 - 31.0 pg    MCHC 35.5 32.0 - 36.0 g/dL    RDW 12.8 11.5 - 14.5 %    Platelets 210 150 - 450 K/uL    MPV 10.2 9.2 - 12.9 fL    Immature Granulocytes 0.5 0.0 - 0.5 %    Gran # (ANC) 12.7 (H) 1.8 - 7.7 K/uL    Immature Grans (Abs) 0.07 (H) 0.00 - 0.04 K/uL    Lymph # 1.3 1.0 - 4.8 K/uL    Mono # 1.1 (H) 0.3 - 1.0 K/uL    Eos # 0.0 0.0 - 0.5 K/uL    Baso # 0.05 0.00 - 0.20 K/uL    nRBC 0 0 /100 WBC    Gran % 83.1 (H) 38.0 - 73.0 %    Lymph % 8.6 (L) 18.0 - 48.0 %    Mono % 7.4 4.0 - 15.0 %    Eosinophil % 0.1 0.0 - 8.0 %    Basophil % 0.3 0.0 - 1.9 %    Differential Method Automated    Troponin I   Result Value Ref Range    Troponin I 6.820 (H) 0.000 - 0.026 ng/mL   Magnesium   Result Value Ref Range    Magnesium 2.2 1.6 - 2.6 mg/dL   CBC auto differential   Result Value Ref Range    WBC 10.72 3.90 - 12.70 K/uL    RBC 4.56 (L) 4.60 - 6.20 M/uL    Hemoglobin 14.5 14.0 - 18.0 g/dL    Hematocrit 41.0 40.0 - 54.0 %    MCV 90 82 - 98 fL    MCH 31.8 (H) 27.0 - 31.0 pg    MCHC 35.4 32.0 - 36.0 g/dL    RDW 12.7 11.5 - 14.5 %    Platelets 210 150 - 450 K/uL    MPV 10.1 9.2 - 12.9 fL    Immature Granulocytes 0.4 0.0 - 0.5 %    Gran # (ANC) 8.3 (H) 1.8 - 7.7 K/uL    Immature Grans (Abs) 0.04 0.00 - 0.04 K/uL    Lymph # 1.5 1.0 - 4.8 K/uL    Mono # 0.9 0.3 - 1.0 K/uL    Eos # 0.0 0.0 - 0.5 K/uL    Baso # 0.04 0.00 - 0.20 K/uL    nRBC 0 0 /100 WBC    Gran % 77.3 (H) 38.0 - 73.0 %    Lymph % 13.6 (L) 18.0 - 48.0 %    Mono % 8.2 4.0 - 15.0 %    Eosinophil % 0.1 0.0 - 8.0 %    Basophil % 0.4 0.0 - 1.9 %    Differential Method Automated    Comprehensive metabolic panel   Result Value Ref Range    Sodium 135 (L) 136 - 145 mmol/L    Potassium 4.4 3.5 - 5.1 mmol/L    Chloride 108 95 - 110 mmol/L    CO2 20 (L) 23 - 29 mmol/L    Glucose 132 (H) 70 - 110 mg/dL    BUN 18 6 - 20 mg/dL    Creatinine 1.9 (H) 0.5 - 1.4 mg/dL    Calcium 9.0  8.7 - 10.5 mg/dL    Total Protein 5.9 (L) 6.0 - 8.4 g/dL    Albumin 3.5 3.5 - 5.2 g/dL    Total Bilirubin 1.0 0.1 - 1.0 mg/dL    Alkaline Phosphatase 70 55 - 135 U/L    AST 74 (H) 10 - 40 U/L     (H) 10 - 44 U/L    eGFR 42 (A) >60 mL/min/1.73 m^2    Anion Gap 7 (L) 8 - 16 mmol/L   Troponin I   Result Value Ref Range    Troponin I 11.571 (H) 0.000 - 0.026 ng/mL   Magnesium   Result Value Ref Range    Magnesium 1.8 1.6 - 2.6 mg/dL   Phosphorus   Result Value Ref Range    Phosphorus 3.6 2.7 - 4.5 mg/dL   Hemoglobin A1c   Result Value Ref Range    Hemoglobin A1C 5.0 4.0 - 5.6 %    Estimated Avg Glucose 97 68 - 131 mg/dL   Lipid Panel   Result Value Ref Range    Cholesterol 181 120 - 199 mg/dL    Triglycerides 147 30 - 150 mg/dL    HDL 42 40 - 75 mg/dL    LDL Cholesterol 109.6 63.0 - 159.0 mg/dL    HDL/Cholesterol Ratio 23.2 20.0 - 50.0 %    Total Cholesterol/HDL Ratio 4.3 2.0 - 5.0    Non-HDL Cholesterol 139 mg/dL   APTT   Result Value Ref Range    aPTT 64.7 (H) 21.0 - 32.0 sec   COVID-19 Rapid Screening   Result Value Ref Range    SARS-CoV-2 RNA, Amplification, Qual Negative Negative   TSH   Result Value Ref Range    TSH 1.607 0.400 - 4.000 uIU/mL   CK   Result Value Ref Range     (H) 20 - 200 U/L   Troponin I   Result Value Ref Range    Troponin I 3.761 (H) 0.000 - 0.026 ng/mL   EKG 12-lead   Result Value Ref Range    QRS Duration 96 ms    [facility] QTC Calculation 437 ms   EKG 12-lead   Result Value Ref Range    QRS Duration 106 ms    [facility]HS QTC Calculation 392 ms   Echo   Result Value Ref Range    BSA 2.07 m2    LVOT stroke volume 73.13 cm3    LVIDd 4.34 3.5 - 6.0 cm    LV Systolic Volume 34.66 mL    LV Systolic Volume Index 17.0 mL/m2    LVIDs 2.99 2.1 - 4.0 cm    LV Diastolic Volume 84.84 mL    LV Diastolic Volume Index 41.59 mL/m2    IVS 1.19 (A) 0.6 - 1.1 cm    LVOT diameter 1.95 cm    LVOT area 3.0 cm2    FS 31 28 - 44 %    Left Ventricle Relative Wall Thickness 0.52 cm    Posterior Wall 1.13 (A) 0.6 - 1.1  cm    LV mass 178.37 g    LV Mass Index 87 g/m2    MV Peak E [place] 1.35 m/s    TDI LATERAL 0.13 m/s    TDI SEPTAL 0.11 m/s    E/E' ratio 11.25 m/s    MV Peak A [place] 0.91 m/s    E/A ratio 1.48     IVRT 49.48 msec    E wave deceleration time 151.19 msec    LV SEPTAL E/E' RATIO 12.27 m/s    LV LATERAL E/E' RATIO 10.38 m/s    LVOT peak [place] 1.30 m/s    Left Ventricular Outflow Tract Mean Velocity 1.07 cm/s    Left Ventricular Outflow Tract Mean Gradient 4.87 mmHg    RVOT peak VTI 15.7 cm    TAPSE 2.05 cm    LA size 3.67 cm    Left Atrium Minor Axis 5.21 cm    Left Atrium Major Axis 5.13 cm    RA Major Axis 4.40 cm    AV mean gradient 7 mmHg    AV peak gradient 12 mmHg    Ao peak [place] 1.72 m/s    Ao VTI 29.40 cm    LVOT peak VTI 24.50 cm    AV valve area 2.49 cm²    AV Velocity Ratio 0.76     AV index (prosthetic) 0.83     RONNI by Velocity Ratio 2.26 cm²    Mr max [place] 3.09 m/s    MV stenosis pressure 1/2 time 43.85 ms    MV valve area p 1/2 method 5.02 cm2    PV mean gradient 3 mmHg    RVOT peak [place] 1.02 m/s    Ao root annulus 3.12 cm    STJ 3.32 cm    Ascending aorta 3.18 cm    IVC diameter 1.95 cm    Mean e' 0.12 m/s    ZLVIDS -1.74     ZLVIDD -3.37     LA Volume Index 26.9 mL/m2    LA volume 54.83 cm3    LA WIDTH 3.4 cm    RA Width 2.8 cm   Cardiac catheterization   Result Value Ref Range    Cath EF Quantitative 65 %   ISTAT PROCEDURE   Result Value Ref Range    POC PH 7.345 (L) 7.35 - 7.45    POC PCO2 42.6 35 - 45 mmHg    POC PO2 508 (H) 80 - 100 mmHg    POC HCO3 23.2 (L) 24 - 28 mmol/L    POC BE -2 -2 to 2 mmol/L    POC SATURATED O2 100 95 - 100 %    Rate 20     Sample ARTERIAL     Site LR     Allens Test Pass     DelSys Adult Vent     Mode AC/PRVC     Vt 450     PEEP 10     FiO2 100    POCT glucose   Result Value Ref Range    POCT Glucose 129 (H) 70 - 110 mg/dL   ISTAT PROCEDURE   Result Value Ref Range    POC PH 7.326 (L) 7.35 - 7.45    POC PCO2 45.2 (H) 35 - 45 mmHg    POC PO2 163 (H) 80 - 100 mmHg    POC HCO3 23.6 (L)  24 - 28 mmol/L    POC BE -2 -2 to 2 mmol/L    POC SATURATED O2 99 95 - 100 %    Rate 16     Sample ARTERIAL     Site Key/UAC     Allens Test N/A     DelSys Adult Vent     Mode AC/PRVC     Vt 450     PEEP 5     FiO2 40    POCT glucose   Result Value Ref Range    POCT Glucose 101 70 - 110 mg/dL         Imaging Results:  Imaging Results              CT Head Without Contrast (Final result)  Result time 04/23/24 21:44:35      Final result by Iron Cooper MD (04/23/24 21:44:35)                   Impression:      No acute intracranial CT abnormality.    All CT scans at this facility are performed  using dose modulation techniques as appropriate to performed exam including the following:  automated exposure control; adjustment of mA and/or kV according to the patients size (this includes techniques or standardized protocols for targeted exams where dose is matched to indication/reason for exam: i.e. extremities or head);  iterative reconstruction technique.      Electronically signed by: Iron Cooper  Date:    04/23/2024  Time:    21:44               Narrative:    EXAMINATION:  CT HEAD WITHOUT CONTRAST    CLINICAL HISTORY:  Mental status change, unknown cause;    TECHNIQUE:  Low dose axial CT images obtained throughout the head without intravenous contrast. Sagittal and coronal reconstructions were performed.    COMPARISON:  None.    FINDINGS:  Intracranial compartment:    Ventricles and sulci are normal in size for age without evidence of hydrocephalus. No extra-axial blood or fluid collections.    The brain parenchyma appears normal. No parenchymal mass, hemorrhage, edema or major vascular distribution infarct.    Skull/extracranial contents (limited evaluation): No fracture. Mastoid air cells and paranasal sinuses are essentially clear.                                       X-Ray Chest AP Portable (Final result)  Result time 04/23/24 21:15:33      Final result by Elpidio Almaraz MD (04/23/24 21:15:33)                    Impression:      Satisfactory endotracheal tube and enteric tube.  If there is a central venous catheter from the right IJ approach, it appears to extend cephalad and is likely malposition.  Correlate clinically.      Electronically signed by: Elpidio Samuelimi  Date:    04/23/2024  Time:    21:15               Narrative:    EXAMINATION:  XR CHEST AP PORTABLE    CLINICAL HISTORY:  Sepsis;    TECHNIQUE:  Single frontal view of the chest was performed.    COMPARISON:  None    FINDINGS:  Satisfactory endotracheal tube and enteric tube.  If there is a central venous catheter it appears to extend cephalad.  Correlate clinically.The lungs are clear, with normal appearance of pulmonary vasculature and no pleural effusion or pneumothorax.    The cardiac silhouette is normal in size. The hilar and mediastinal contours are unremarkable.    Bones are intact.                                       The EKG was ordered, reviewed, and independently interpreted by the ED provider.  Interpretation time: 20:09  Rate: 83 BPM  Rhythm: normal sinus rhythm  Interpretation: Rightward axis. No STEMI.           The Emergency Provider reviewed the vital signs and test results, which are outlined above.     ED Discussion       9:20 PM: Discussed pt's case with Liana Crum NP (Critical Care Medicine) who see pt in ED.    9:50 PM: Discussed case with Dr. Lloyd ([facility]). Dr. Lloyd agrees with current care and management of pt and accepts admission.   Admitting Service: Hospital Medicine  Admitting Physician: Dr. Lloyd  Admit to: Inpatient ICU    9:50 PM: Re-evaluated pt. I have discussed test results, shared treatment plan, and the need for admission with patient and family at bedside. Pt and family express understanding at this time and agree with all information. All questions answered. Pt and family have no further questions or concerns at this time. Pt is ready for admit.    10:08 PM: Talked with pt's wife and  "father. Pt's wife states pt woke up c/o CP. She heart pt making "snorting sounds" in another room right before he went into cardiac arrest. Pt sees Dr. Philip (Cardiology). Pt saw him last on 1/22/24 for c/o palpitations.     10:21 PM: Discussed pt's case with Dr. Ochoa (Cardiology) who states no stemi on EKG. He recommend iv heparin if no contraindications and to keep npo after midnight. If any nsvt, place on iv amiodarone    10:39 PM: Family is in room with pt. Pt is responding to verbal stimuli.        Medical Decision Making  Amount and/or Complexity of Data Reviewed  Labs: ordered. Decision-making details documented in ED Course.  Radiology: ordered. Decision-making details documented in ED Course.  ECG/medicine tests: ordered and independent interpretation performed. Decision-making details documented in ED Course.    Risk  Prescription drug management.  Decision regarding hospitalization.                ED Medication(s):  Medications   mupirocin 2 % ointment ( Nasal Given 4/24/24 0835)   piperacillin-tazobactam (ZOSYN) 4.5 g in dextrose 5 % in water (D5W) 100 mL IVPB (MB+) (4.5 g Intravenous New Bag 4/24/24 1545)   sodium chloride 0.9% flush 10 mL (has no administration in time range)   potassium chloride 10 mEq in 100 mL IVPB (has no administration in time range)     And   potassium chloride 10 mEq in 100 mL IVPB (has no administration in time range)     And   potassium chloride 10 mEq in 100 mL IVPB (has no administration in time range)   magnesium sulfate 2g in water 50mL IVPB (premix) (0 g Intravenous Stopped 4/24/24 0610)   magnesium sulfate 2g in water 50mL IVPB (premix) (has no administration in time range)   aspirin chewable tablet 81 mg (81 mg Oral Given 4/24/24 0845)   labetaloL injection 10 mg (10 mg Intravenous Given 4/24/24 0837)   pneumoc 20-jeovanny conj-dip cr(PF) (PREVNAR-20 (PF)) injection Syrg 0.5 mL (has no administration in time range)   influenza (QUADRIVALENT PF) vaccine 0.5 mL (has no " administration in time range)   acetaminophen tablet 650 mg (has no administration in time range)   ondansetron disintegrating tablet 8 mg (has no administration in time range)   0.9%  NaCl infusion (0 mL/hr Intravenous Stopped 4/24/24 1500)   enoxaparin injection 40 mg (has no administration in time range)   succinylcholine (ANECTINE) 20 mg/mL injection (100 mg  Given 4/23/24 2012)   propofol (DIPRIVAN) 10 mg/mL infusion (50 mcg/kg/min × 95.4 kg Intravenous Verify Only 4/23/24 2300)   potassium chloride 10 mEq in 100 mL IVPB (0 mEq Intravenous Stopped 4/23/24 2220)   heparin 25,000 units in dextrose 5% (100 units/ml) IV bolus from bag LOW INTENSITY nomogram - [facility] (3,990 Units Intravenous Bolus from Bag 4/23/24 2348)   lactated ringers bolus 1,000 mL (0 mLs Intravenous Stopped 4/24/24 0012)   lactated ringers bolus 1,000 mL (0 mLs Intravenous Stopped 4/24/24 0217)   potassium bicarbonate disintegrating tablet 40 mEq (40 mEq Per OG tube Given 4/24/24 0120)   magnesium sulfate 2g in water 50mL IVPB (premix) (0 g Intravenous Stopped 4/24/24 1037)   ALPRAZolam tablet 0.5 mg (0.5 mg Oral Given 4/24/24 0906)       There are no discharge medications for this patient.              Scribe Attestation:   Scribe #1: I performed the above scribed service and the documentation accurately describes the services I performed. I attest to the accuracy of the note.     Attending:   Physician Attestation Statement for Scribe #1: I, Aurora Ruiz MD, personally performed the services described in this documentation, as scribed by Segundo Smith, in my presence, and it is both accurate and complete.           Clinical Impression       ICD-10-CM ICD-9-CM   1. Cardiac arrest  I46.9 427.5   2. V-tach  I47.20 427.1   3. SLIME (acute kidney injury)  N17.9 584.9   4. NSTEMI (non-ST elevated myocardial infarction)  I21.4 410.70       Disposition:   Disposition: Admitted  Condition: Critical         Aurora Ruiz MD  04/24/24 1626

## 2024-04-24 NOTE — PLAN OF CARE
Nutrition Recommendations 4/24/24:  1. Initiate pt onto continuous Enteral nutrition, recommend Peptamen Intense VHP via NG/OG tube, goal rate 70 mL/hr, starting at 10 mL/hr, then advance to goal rate within 24 hrs if pt is tolerating or per MD/NP   -Formula at goal rate provides: 1680 kcals/day (75% EEN), 155 g protein.day (104% EPN), 1411 mL free formula water/day   -135 mL q4h free water flushes (810 mL/day) = total from formula + FWF = 2221 mL water/day, per MD/NP   -Check Mg, K+, Na, Phos and Glu before and during initiation, correct as indicated   2. If PO diet warranted, recommend Cardiac diet, Texture per SLP recommendations   3. Weigh twice weekly  4. Collaboration with other providers     Goals:   1. Pt will be initiated onto continuous EN within 24 hrs   2. Pt will tolerate and intake 75% EEN and EPN prior to RD follow up    Kamala Franz, Registration Eligible, Provisional LDN

## 2024-04-24 NOTE — ASSESSMENT & PLAN NOTE
Patient with acute kidney injury/acute renal failure likely due to low flow state/cardiac arrest. Baseline creatinine unknown - Labs reviewed- Renal function/electrolytes with Estimated Creatinine Clearance: 49.9 mL/min (A) (based on SCr of 1.9 mg/dL (H)). according to latest data. Monitor urine output and serial BMP and adjust therapy as needed. Avoid nephrotoxins and renally dose meds for GFR listed above.    4/24 monitor

## 2024-04-24 NOTE — CONSULTS
"[facility] ([facility])  Cardiology  Consult Note    Patient Name: Milind Melgar  MRN: [id number]  Admission Date: 4/23/2024  Hospital Length of Stay: 1 days  Code Status: Full Code   Attending Provider: Elvia Lloyd MD   Consulting Provider: Amisha Park PA-C  Primary Care Physician: No primary care provider on file.  Principal Problem:Cardiac arrest    Patient information was obtained from patient, past medical records, and ER records.     Inpatient consult to Cardiology  Consult performed by: Amisha Park PA-C  Consult ordered by: Liana Crum NP        Subjective:     Chief Complaint:  Cardiac arrest    HPI:   HPI obtained from patient and spouse at bedside     Mr. Melgar is a 53 year old male patient whose current medical conditions include HTN, hyperlipidemia, diverticulitis, PVC's, and low testosterone on therapy who presented to [facility] ED via EMS s/p cardiac arrest. Patient's family found him collapsed, unresponsive, pulseless, and apneic. CPR was initiated immediately and continued upon EMS arrival. Patient's initial rhythm was reportedly VT and ROSC was obtained after defibrillation x 1. Initial workup revealed lactic acidosis (3.8), hypokalemia (K 3.2), creatinine of 1.7, and troponin of 0.112>6.820 and patient was subsequently intubated and admitted to ICU for further evaluation and treatment. Cardiology consulted to assist with management. Patient seen and examined today, recently extubated. Awake, alert, some mild short term memory loss. He complained of palpitations/"feeling weird" per family prior to arrest. No apparent associated SOB or hayley CP. No prior history of CAD or MI. Very physically active and does [employer]. Followed by [facility] cardiology (Dr. Philip) and previously noted to have 8% PVC burden and was placed on BB therapy but apparently discontinued the medication shortly after initiation as palpitations resolved completed after donating blood. Prior TTE on file " showed normal EF, repeat pending. EKG reviewed, SR, rightward axis, no STEMI. Troponin bumped to 11.571, will continue to trend. [facility] today. EP consulted for AICD.     Past Medical History:   Diagnosis Date    Diverticulitis     Hyperlipidemia     Hypertension     PVC's (premature ventricular contractions)        No past surgical history on file.    Review of patient's allergies indicates:  No Known Allergies    Current Facility-Administered Medications   Medication Dose Route Frequency Provider Last Rate Last Admin    aspirin chewable tablet 81 mg  81 mg Oral Daily Elvia Lloyd MD        chlorhexidine 0.12 % solution 15 mL  15 mL Mouth/Throat BID Liana Crum NP        famotidine (PF) injection 20 mg  20 mg Intravenous BID Liana Crum NP        fentaNYL 2500 mcg in 0.9% sodium chloride 250 mL infusion premix (titrating)  0-250 mcg/hr Intravenous Continuous Aurora Ruiz MD 20 mL/hr at 04/24/24 0600 200 mcg/hr at 04/24/24 0600    heparin 25,000 units in dextrose 5% (100 units/ml) IV bolus from bag LOW INTENSITY nomogram - [facility]  45.3 Units/kg Intravenous PRN Liana Crum NP        heparin 25,000 units in dextrose 5% (100 units/ml) IV bolus from bag LOW INTENSITY nomogram - [facility]  30 Units/kg Intravenous PRN Liana Crum NP        heparin 25,000 units in dextrose 5% 250 mL (100 units/mL) infusion LOW INTENSITY nomogram - [facility]  0-40 Units/kg/hr Intravenous Continuous Liana Crum NP 10.6 mL/hr at 04/24/24 0600 12 Units/kg/hr at 04/24/24 0600    lactated ringers infusion   Intravenous Continuous Liana Crum NP 75 mL/hr at 04/24/24 0300 Rate Verify at 04/24/24 0300    magnesium sulfate 2g in water 50mL IVPB (premix)  2 g Intravenous PRN Liana Crum NP   Stopped at 04/24/24 0610    magnesium sulfate 2g in water 50mL IVPB (premix)  4 g Intravenous PRN Liana Crum NP        mupirocin 2 % ointment   Nasal BID Elvia Lloyd MD   Given at 04/23/24 2485     NORepinephrine 4 mg in dextrose 5% 250 mL infusion (premix)  0-0.2 mcg/kg/min Intravenous Continuous Liana Crum NP 9.9 mL/hr at 04/24/24 0600 0.03 mcg/kg/min at 04/24/24 0600    piperacillin-tazobactam (ZOSYN) 4.5 g in dextrose 5 % in water (D5W) 100 mL IVPB (MB+)  4.5 g Intravenous Q8H Liana Crum NP   Stopped at 04/24/24 0334    potassium chloride 10 mEq in 100 mL IVPB  40 mEq Intravenous PRN Liana Crum NP        And    potassium chloride 10 mEq in 100 mL IVPB  60 mEq Intravenous PRN Liana Crum NP        And    potassium chloride 10 mEq in 100 mL IVPB  80 mEq Intravenous PRN Liana Crum NP        propofol (DIPRIVAN) 10 mg/mL infusion  0-50 mcg/kg/min Intravenous Continuous Liana Crum NP 13.2 mL/hr at 04/24/24 0600 25 mcg/kg/min at 04/24/24 0600    sodium chloride 0.9% flush 10 mL  10 mL Intravenous PRN Liana Crum NP         Family History    None       Tobacco Use    Smoking status: Never    Smokeless tobacco: Never   Substance and Sexual Activity    Alcohol use: Not on file    Drug use: Not on file    Sexual activity: Not on file     Review of Systems   Reason unable to perform ROS: as per hpi/per family.   Constitutional: Positive for malaise/fatigue.   HENT: Negative.     Eyes: Negative.    Cardiovascular:  Positive for palpitations.   Respiratory: Negative.     Endocrine: Negative.    Hematologic/Lymphatic: Negative.    Skin: Negative.    Musculoskeletal: Negative.    Gastrointestinal: Negative.    Genitourinary: Negative.    Neurological:  Positive for weakness.   Psychiatric/Behavioral: Negative.     Allergic/Immunologic: Negative.      Objective:     Vital Signs (Most Recent):  Temp: 99 °F (37.2 °C) (04/24/24 0729)  Pulse: 76 (04/24/24 0729)  Resp: 13 (04/24/24 0729)  BP: 96/61 (04/24/24 0600)  SpO2: 100 % (04/24/24 0729) Vital Signs (24h Range):  Temp:  [94.1 °F (34.5 °C)-99 °F (37.2 °C)] 99 °F (37.2 °C)  Pulse:  [58-99] 76  Resp:  [13-34] 13  SpO2:   [99 %-100 %] 100 %  BP: ()/() 96/61  Arterial Line BP: ()/(52-81) 105/58     Weight: 88.3 kg (194 lb 10.7 oz)  Body mass index is 28.75 kg/m².    SpO2: 100 %         Intake/Output Summary (Last 24 hours) at 4/24/2024 0804  Last data filed at 4/24/2024 0600  Gross per 24 hour   Intake 3405.38 ml   Output 250 ml   Net 3155.38 ml       Lines/Drains/Airways       Drain  Duration                  NG/OG Tube 04/23/24 2025 16 Fr. Center mouth <1 day         Urethral Catheter 04/23/24 2150 Temperature probe;Silicone 16 Fr. <1 day              Airway  Duration                  Airway - Non-Surgical 04/23/24 2005 Endotracheal Tube <1 day              Arterial Line  Duration             Arterial Line 04/23/24 2227 Right Radial <1 day              Peripheral Intravenous Line  Duration                  Peripheral IV - Single Lumen 04/23/24 2010 18 G Right Antecubital <1 day         Peripheral IV - Single Lumen 04/23/24 2015 18 G Left Antecubital <1 day         Peripheral IV - Single Lumen 04/23/24 2037 18 G Anterior;Left Forearm <1 day         Peripheral IV - Single Lumen 04/23/24 2037 20 G Posterior;Right Hand <1 day         Peripheral IV - Single Lumen 04/24/24 20 G Anterior;Right Forearm <1 day                     Physical Exam  Vitals and nursing note reviewed.   Constitutional:       Appearance: He is ill-appearing.      Comments: Intubated   HENT:      Head: Normocephalic and atraumatic.   Eyes:      Pupils: Pupils are equal, round, and reactive to light.   Cardiovascular:      Rate and Rhythm: Normal rate and regular rhythm.      Heart sounds: S1 normal and S2 normal. No murmur heard.  Abdominal:      Palpations: Abdomen is soft.   Musculoskeletal:      Right lower leg: No edema.      Left lower leg: No edema.   Neurological:      Comments: Awake, oriented, some mild short term memory loss   Psychiatric:         Mood and Affect: Mood normal.          Significant Labs: CMP   Recent Labs   Lab  04/23/24 2019 04/24/24  0336    135*   K 3.2* 4.4    108   CO2 20* 20*   * 132*   BUN 16 18   CREATININE 1.7* 1.9*   CALCIUM 9.8 9.0   PROT 7.1 5.9*   ALBUMIN 4.1 3.5   BILITOT 0.6 1.0   ALKPHOS 102 70   * 74*   * 102*   ANIONGAP 12 7*   , CBC   Recent Labs   Lab 04/23/24 2019 04/23/24 2238 04/24/24  0336   WBC 7.64 15.27* 10.72   HGB 16.5 16.1 14.5   HCT 46.7 45.4 41.0    210 210   , Troponin   Recent Labs   Lab 04/23/24 2019 04/23/24 2238 04/24/24  0336   TROPONINI 0.112* 6.820* 11.571*   , and All pertinent lab results from the last 24 hours have been reviewed.    Significant Imaging: Echocardiogram: Transthoracic echo (TTE) complete (Cupid Only): No results found for this or any previous visit. and EKG: Reviewed  Assessment and Plan:   Patient who presents s/p cardiac arrest. Extubated, awake, alert. Troponin up-trending. Continue OMT/supportive care. TTE pending. [facility] today by Dr. Ochoa, further rec's to follow.    * Cardiac arrest  -Reportedly VT/VF upon arrival, ROSC obtained after CPR and defib x 1  -Stable since admission  -Troponin up-trending   -TTE pending  -[facility] today  -ASA, heparin gtt  -Will optimize meds post procedure     NSTEMI (non-ST elevated myocardial infarction)  -Troponin 0.112>6.820>11.571, continue to trend  -Continue ASA, heparin gtt  -BP soft, unable to further maximize med regimen, will do so as tolerated  -TTE pending  -[facility] planned pending improvement/stability     Hypertension  -Optimize meds post procedure    Lactic acidosis  -Secondary to cardiac arrest    Hypokalemia  -Repletion as per primary team    Shock liver  -Secondary to cardiac arrest  -Monitor    SLIME (acute kidney injury)  -Secondary to cardiac arrest/shock    Ventricular fibrillation  -Reportedly had VT/VF upon EMS arrival  -K initially low at 3.2, has been repleted  -Known history of PVC's, EF previously normal  -Repeat TTE pending  -Continue supportive care  -Ischemic  workup/LHC today  -EP also consulted for AICD consideration        VTE Risk Mitigation (From admission, onward)           Ordered     heparin (porcine) injection  As needed (PRN)         04/24/24 1200     IP VTE HIGH RISK PATIENT  Once         04/23/24 2259     Place sequential compression device  Until discontinued         04/23/24 2257                    Thank you for your consult. I will follow-up with patient. Please contact us if you have any additional questions.    Amisha Park PA-C  Cardiology   [facility] ([facility])

## 2024-04-24 NOTE — NURSING
Removed 3ml air from TR band. No drainage initially from site but oozing noted when RN returned to removed second amt of air. No swelling to site, refilled 3ml air back into TR band. Will reassess in 30 minutes

## 2024-04-24 NOTE — ASSESSMENT & PLAN NOTE
- vent bundle in place  - VAP prophylaxis  - wean settings as tolerated  - prn ABG  - SAT/SBT daily

## 2024-04-24 NOTE — ASSESSMENT & PLAN NOTE
- holding medications acutely  - patient was on acebutolol, took himself off 6-8 wks ago  - add meds as needed    4/24 prn meds as needed  May need oral meds

## 2024-04-24 NOTE — NURSING
Received patient back from cath lab. Pt AAOx3, MAEW, speech approp and clear. Denies all pain and discomfort and SOB. Room air, VSS. NS infusing at 150ml/hr x3 hours. Heparin gtt discontinued per cardiologist. TR band in place to left wrist, site WNL, hand and arm warm to touch, cap refill <3 seconds, no swelling to site. Patient instructed to call RN for any pain, swelling or drainage to site, pt verbalized understanding.

## 2024-04-24 NOTE — H&P
[facility])  Critical Care Medicine  History & Physical    Patient Name: Milind Melgar  MRN: [id number]  Admission Date: 4/23/2024  Hospital Length of Stay: 1 days  Code Status: Full Code  Attending Physician: Elvia Lloyd MD   Primary Care Provider: No primary care provider on file.   Principal Problem: Cardiac arrest    Subjective:     HPI:  Milind Melgar is a 53-year-old male with a past medical history of hypertension, hyperlipidemia, diverticulitis, palpitations due to PVC's, low testosterone-on therapy, who presented to the ED via EMS after suffering cardiac arrest at home. Per family, patient was found collapsed, unresponsive, pulseless and apneic. Last known well was 5 minutes prior to discovery. CPR was initiated by family and continued by [facility] for 3 minutes. His initial rhythm was pulseless ventricular tachycardia. ROSC was achieved after 1 defibrillation. EMS administered Ketamine in attempt to secure definitive airway placement without success and he arrived to ED with BVM ventilations in progress.     Family reports that yesterday patient complained of feeling palpitations impending before lunch, then at noon he  reported feeling them. He denied chest pain/discomfort, shortness of breath, syncope, dizziness at that time. Chart reveals cardiology visit in January this year for palpitations with 10 day Holter monitor showing no arrhythmias other than PVC's with 8% burden. Patient was changed from losartan to acebutolol in January, but took himself off of his medications approx 2 weeks later. He has also donated blood 2x since January, last being 2-3 weeks ago. Unable to obtain remainder of ROS due to patient intubated.     ED course revealed EKG with R axis deviation, QTc 437. CBC shows WBC 15.27, likely reactive. Chemistry w/K 3.2, CO2 20, BUN/Cr 16/1.7, glucose 195, , . Troponin 0.112, BNP <10. Procalcitonin negative. UDS negative. Lactic acid 3.8. CXR w/no  infiltrates, opacities, vascular congestion, or effusions. Patient is intubated, sedated, and on mechanical ventilation. CT head negative.     Cardiology consulted and recommended heparin protocol and if any recurrence of NSVT to start amiodarone.     Critical care was consulted for admission and management.         Hospital/ICU Course:  No notes on file     Past Medical History:   Diagnosis Date    Diverticulitis     Hyperlipidemia     Hypertension     PVC's (premature ventricular contractions)        No past surgical history on file.    Review of patient's allergies indicates:  No Known Allergies    Family History    None       Tobacco Use    Smoking status: Never    Smokeless tobacco: Never   Substance and Sexual Activity    Alcohol use: Not on file    Drug use: Not on file    Sexual activity: Not on file         Review of Systems   Unable to perform ROS: Intubated     Objective:     Vital Signs (Most Recent):  Temp: (!) 94.1 °F (34.5 °C) (04/24/24 0000)  Pulse: 66 (04/24/24 0000)  Resp: 16 (04/24/24 0000)  BP: 105/65 (04/24/24 0000)  SpO2: 100 % (04/24/24 0000) Vital Signs (24h Range):  Temp:  [94.1 °F (34.5 °C)-98.2 °F (36.8 °C)] 94.1 °F (34.5 °C)  Pulse:  [58-99] 66  Resp:  [16-34] 16  SpO2:  [99 %-100 %] 100 %  BP: ()/() 105/65  Arterial Line BP: ()/(54-81) 106/64     Weight: 88.3 kg (194 lb 10.7 oz)  Body mass index is 28.75 kg/m².      Intake/Output Summary (Last 24 hours) at 4/24/2024 0015  Last data filed at 4/23/2024 2302  Gross per 24 hour   Intake 185.59 ml   Output 175 ml   Net 10.59 ml        Physical Exam  Vitals and nursing note reviewed.   Constitutional:       General: He is not in acute distress.     Appearance: He is overweight. He is not ill-appearing.      Interventions: He is sedated, intubated and restrained.   HENT:      Head: Normocephalic and atraumatic.      Mouth/Throat:      Lips: Pink.      Mouth: Mucous membranes are moist.   Eyes:      General: Lids are normal.       Conjunctiva/sclera: Conjunctivae normal.      Pupils: Pupils are equal, round, and reactive to light.      Right eye: Pupil is sluggish.      Left eye: Pupil is sluggish.   Neck:      Vascular: No JVD.   Cardiovascular:      Rate and Rhythm: Normal rate and regular rhythm.      Pulses:           Radial pulses are 2+ on the right side and 2+ on the left side.        Dorsalis pedis pulses are 2+ on the right side and 2+ on the left side.      Heart sounds: Normal heart sounds, S1 normal and S2 normal.   Pulmonary:      Effort: No respiratory distress. He is intubated.      Breath sounds: Normal breath sounds and air entry.   Abdominal:      General: Bowel sounds are normal.      Palpations: Abdomen is soft.      Tenderness: There is no abdominal tenderness.   Musculoskeletal:      Cervical back: Neck supple.      Right lower leg: No edema.      Left lower leg: No edema.   Skin:     General: Skin is warm.      Capillary Refill: Capillary refill takes less than 2 seconds.   Neurological:      General: No focal deficit present.      Mental Status: He is lethargic.      GCS: GCS eye subscore is 4. GCS verbal subscore is 1. GCS motor subscore is 6.      Comments: Off of sedation-GCS 11T, +cough/gag, +corneal reflexes, +pupils, follows commands          Vents:  Vent Mode: A/C (04/23/24 2318)  Set Rate: 16 BPM (04/23/24 2318)  Vt Set: 450 mL (04/23/24 2318)  PEEP/CPAP: 5 cmH20 (04/23/24 2318)  Oxygen Concentration (%): 40 (04/24/24 0000)  Peak Airway Pressure: 18 cmH20 (04/23/24 2318)  Plateau Pressure: 17 cmH20 (04/23/24 2318)  Total Ve: 7.16 L/m (04/23/24 2318)  Negative Inspiratory Force (cm H2O): 0 (04/23/24 2318)  F/VT Ratio<105 (RSBI): (!) 35.71 (04/23/24 2318)    Lines/Drains/Airways       Drain  Duration                  NG/OG Tube 04/23/24 2025 16 Fr. Center mouth <1 day         Urethral Catheter 04/23/24 2150 Temperature probe;Silicone 16 Fr. <1 day              Airway  Duration                  Airway -  Non-Surgical 04/23/24 2005 Endotracheal Tube <1 day              Arterial Line  Duration             Arterial Line 04/23/24 2227 Right Radial <1 day              Peripheral Intravenous Line  Duration                  Peripheral IV - Single Lumen 04/23/24 2010 18 G Right Antecubital <1 day         Peripheral IV - Single Lumen 04/23/24 2015 18 G Left Antecubital <1 day         Peripheral IV - Single Lumen 04/23/24 2037 18 G Anterior;Left Forearm <1 day         Peripheral IV - Single Lumen 04/23/24 2037 20 G Posterior;Right Hand <1 day                  Current Facility-Administered Medications   Medication Dose Route Frequency Last Rate Last Admin    fentanyl  0-250 mcg/hr Intravenous Continuous 2.5 mL/hr at 04/23/24 2300 25 mcg/hr at 04/23/24 2300    heparin (porcine) in D5W  0-40 Units/kg/hr Intravenous Continuous        NORepinephrine bitartrate-D5W  0-0.2 mcg/kg/min Intravenous Continuous 49.7 mL/hr at 04/23/24 2317 0.15 mcg/kg/min at 04/23/24 2317    propofoL  0-50 mcg/kg/min Intravenous Continuous 15.9 mL/hr at 04/24/24 0018 30 mcg/kg/min at 04/24/24 0018       Significant Labs:    CBC/Anemia Profile:  Recent Labs   Lab 04/23/24 2019 04/23/24 2238   WBC 7.64 15.27*   HGB 16.5 16.1   HCT 46.7 45.4    210   MCV 91 90   RDW 12.8 12.8        Chemistries:  Recent Labs   Lab 04/23/24 2019 04/23/24 2238     --    K 3.2*  --      --    CO2 20*  --    BUN 16  --    CREATININE 1.7*  --    CALCIUM 9.8  --    ALBUMIN 4.1  --    PROT 7.1  --    BILITOT 0.6  --    ALKPHOS 102  --    *  --    *  --    MG  --  2.2       All pertinent labs within the past 24 hours have been reviewed.    Significant Imaging:   I have reviewed all pertinent imaging results/findings within the past 24 hours.  Assessment/Plan:     Pulmonary  On mechanically assisted ventilation  - vent bundle in place  - VAP prophylaxis  - wean settings as tolerated  - prn ABG  - SAT/SBT daily    Cardiac/Vascular  * Cardiac  arrest  - etiology unclear  - suspect arrhythmia event  - echo ordered  - sedation vacation revealed patient following commands, therefore no TTM initiated.       NSTEMI (non-ST elevated myocardial infarction)  - troponin 0.112->6.820  - likely 2/2 demand/ischemia following cardiac arrest  - heparin gtt  - lipid panel, A1c ordered      History of palpitations  - cardiology consulted  - further work up when stable  - ?ICD    Hyperlipidemia  - checking lipids  - resume statin when as LFT's allow  - monitor LFT's      Hypertension  - holding medications acutely  - patient was on acebutolol, took himself off 6-8 wks ago  - add meds as needed    Ventricular fibrillation  - ?R on T or V-tach prior to arrest  - cardiology consulted and appreciate recs  - amio if VT   - monitor and replace electrolytes prn      Renal/  Lactic acidosis  - resolved  - suspect 2/2 arrest event    Hypokalemia  Patient has hypokalemia which is Acute and currently uncontrolled. Most recent potassium levels reviewed-   Lab Results   Component Value Date    K 3.2 (L) 04/23/2024     - Will continue potassium replacement per protocol and recheck repeat levels after replacement completed  - check mag    SLIME (acute kidney injury)  Patient with acute kidney injury/acute renal failure likely due to low flow state/cardiac arrest. Baseline creatinine unknown - Labs reviewed- Renal function/electrolytes with Estimated Creatinine Clearance: 55.9 mL/min (A) (based on SCr of 1.7 mg/dL (H)). according to latest data. Monitor urine output and serial BMP and adjust therapy as needed. Avoid nephrotoxins and renally dose meds for GFR listed above.    ID  Severe sepsis  This patient does not have evidence of infective focus  My overall impression is sepsis.  Source: Unknown  Antibiotics given-   Antibiotics (72h ago, onward)      Start     Stop Route Frequency Ordered    04/24/24 0000  piperacillin-tazobactam (ZOSYN) 4.5 g in dextrose 5 % in water (D5W) 100 mL  IVPB (MB+)         -- IV Every 8 hours (non-standard times) 04/23/24 2256 04/23/24 2330  mupirocin 2 % ointment         04/28/24 2059 Nasl 2 times daily 04/23/24 2224          Latest lactate reviewed-  Recent Labs   Lab 04/23/24 2337   LACTATE 1.4     Organ dysfunction indicated by Acute kidney injury and Acute liver injury    Fluid challenge not indicated.     Post- resuscitation assessment Yes Perfusion exam was performed within 6 hours of septic shock presentation after bolus shows Adequate tissue perfusion assessed by invasive monitoring     Suspect leukocytosis is reactive after cardiac arrest  Blood cultures, sputum culture pending  Will Not start Pressors- Levophed for MAP of 65  Source control achieved by: antibiotics    GI  Shock liver  Patient is likely to have ischemic hepatitis as evidenced by abnormalities in AST and ALT. Etiology includes cardiogenic shock. Continue to monitor liver function while treating underlying condition. Daily labs to include CMP. Avoid hepatotoxins.    AST   Date Value Ref Range Status   04/23/2024 108 (H) 10 - 40 U/L Final     ALT   Date Value Ref Range Status   04/23/2024 130 (H) 10 - 44 U/L Final             Critical Care Daily Checklist:    A: Awake: RASS Goal/Actual Goal:    Actual:     B: Spontaneous Breathing Trial Performed?     C: SAT & SBT Coordinated?  Y                      D: Delirium: CAM-ICU Overall CAM-ICU: Positive   E: Early Mobility Performed? Yes   F: Feeding Goal:    Status:     Current Diet Order   Procedures    Diet NPO      AS: Analgesia/Sedation Propofol, fentanyl   T: Thromboembolic Prophylaxis Heparin gtt   H: HOB > 300 Yes   U: Stress Ulcer Prophylaxis (if needed) pepcid   G: Glucose Control monitor   B: Bowel Function Stool Occurrence: 0   I: Indwelling Catheter (Lines & Chen) Necessity reviewed   D: De-escalation of Antimicrobials/Pharmacotherapies reviewed    Plan for the day/ETD ICU admission    Code Status:  Family/Goals of Care: Full  Code  Discharge home     Critical Care Time: 73 minutes  Critical secondary to high risk monitoring, requiring mechanical ventilation and vasopressor support.  Patient has a condition that poses threat to life and bodily function.    Critical care was time spent personally by me on the following activities: development of treatment plan with patient or surrogate and bedside caregivers, discussions with consultants, evaluation of patient's response to treatment, examination of patient, ordering and performing treatments and interventions, ordering and review of laboratory studies, ordering and review of radiographic studies, pulse oximetry, re-evaluation of patient's condition. This critical care time did not overlap with that of any other provider or involve time for any procedures.     Liana Crum NP  Critical Care Medicine  '[facility] - [facility] ([facility])

## 2024-04-24 NOTE — ASSESSMENT & PLAN NOTE
- ?R on T or V-tach prior to arrest  - cardiology consulted and appreciate recs  - amio if VT   - monitor and replace electrolytes prn

## 2024-04-24 NOTE — PROGRESS NOTES
Pharmacist Renal Dose Adjustment Note    Milind Melgar is a 53 y.o. male being treated with the medication famotidine.     Patient Data:    Vital Signs (Most Recent):  Temp: 99.1 °F (37.3 °C) (04/24/24 0900)  Pulse: 83 (04/24/24 0900)  Resp: 13 (04/24/24 0900)  BP: (!) 159/102 (04/24/24 0900)  SpO2: 100 % (04/24/24 0900) Vital Signs (72h Range):  Temp:  [94.1 °F (34.5 °C)-99.1 °F (37.3 °C)]   Pulse:  [58-99]   Resp:  [11-34]   BP: ()/()   SpO2:  [99 %-100 %]   Arterial Line BP: ()/(52-92)      Recent Labs   Lab 04/23/24  2019 04/24/24  0336   CREATININE 1.7* 1.9*     Serum creatinine: 1.9 mg/dL (H) 04/24/24 0336  Estimated creatinine clearance: 49.9 mL/min (A)    Per protocol for CrCl < 50 ml/min, dose will be reduced from 20 mg IV twice daily to 20 mg IV once daily.     Pharmacist's Name: Katherine E Mcardle  Pharmacist's Extension: [phone]

## 2024-04-24 NOTE — ASSESSMENT & PLAN NOTE
-Troponin 0.112>6.820>11.571, continue to trend  -Continue ASA, heparin gtt  -BP soft, unable to further maximize med regimen, will do so as tolerated  -TTE pending  -LHC planned pending improvement/stability

## 2024-04-24 NOTE — HPI
"HPI obtained from patient and spouse at bedside     Mr. Melgar is a 53 year old male patient whose current medical conditions include HTN, hyperlipidemia, diverticulitis, PVC's, and low testosterone on therapy who presented to [facility] ED via EMS s/p cardiac arrest. Patient's family found him collapsed, unresponsive, pulseless, and apneic. CPR was initiated immediately and continued upon EMS arrival. Patient's initial rhythm was reportedly VT and ROSC was obtained after defibrillation x 1. Initial workup revealed lactic acidosis (3.8), hypokalemia (K 3.2), creatinine of 1.7, and troponin of 0.112>6.820 and patient was subsequently intubated and admitted to ICU for further evaluation and treatment. Cardiology consulted to assist with management. Patient seen and examined today, recently extubated. Awake, alert, some mild short term memory loss. He complained of palpitations/"feeling weird" per family prior to arrest. No apparent associated SOB or hayley CP. No prior history of CAD or MI. Very physically active and does Crossfit. Followed by [facility] cardiology (Dr. Philip) and previously noted to have 8% PVC burden and was placed on BB therapy but apparently discontinued the medication shortly after initiation as palpitations resolved completed after donating blood. Prior TTE on file showed normal EF, repeat pending. EKG reviewed, SR, rightward axis, no STEMI. Troponin bumped to 11.571, will continue to trend. [facility] today. EP consulted for AICD.   "

## 2024-04-24 NOTE — HPI
Milind Melgar is a 53-year-old male with a past medical history of hypertension, hyperlipidemia, diverticulitis, palpitations due to PVC's, low testosterone-on therapy, who presented to the ED via EMS after suffering cardiac arrest at home. Per family, patient was found collapsed, unresponsive, pulseless and apneic. Last known well was 5 minutes prior to discovery. CPR was initiated by family and continued by AASI for 3 minutes. His initial rhythm was pulseless ventricular tachycardia. ROSC was achieved after 1 defibrillation. EMS administered Ketamine in attempt to secure definitive airway placement without success and he arrived to ED with BVM ventilations in progress.     Family reports that yesterday patient complained of feeling palpitations impending before lunch, then at noon he  reported feeling them. He denied chest pain/discomfort, shortness of breath, syncope, dizziness at that time. Chart reveals cardiology visit in January this year for palpitations with 10 day Holter monitor showing no arrhythmias other than PVC's with 8% burden. Patient was changed from losartan to acebutolol in January, but took himself off of his medications approx 2 weeks later. He has also donated blood 2x since January, last being 2-3 weeks ago. Unable to obtain remainder of ROS due to patient intubated.     ED course revealed EKG with R axis deviation, QTc 437. CBC shows WBC 15.27, likely reactive. Chemistry w/K 3.2, CO2 20, BUN/Cr 16/1.7, glucose 195, , . Troponin 0.112, BNP <10. Procalcitonin negative. UDS negative. Lactic acid 3.8. CXR w/no infiltrates, opacities, vascular congestion, or effusions. Patient is intubated, sedated, and on mechanical ventilation. CT head negative.     Cardiology consulted and recommended heparin protocol and if any recurrence of NSVT to start amiodarone.     Critical care was consulted for admission and management.

## 2024-04-24 NOTE — ASSESSMENT & PLAN NOTE
-Reportedly VT/VF upon arrival, ROSC obtained after CPR and defib x 1  -Stable since admission  -Troponin up-trending   -TTE pending  -[facility] today  -ASA, heparin gtt  -Will optimize meds post procedure

## 2024-04-24 NOTE — SUBJECTIVE & OBJECTIVE
Past Medical History:   Diagnosis Date    Diverticulitis     Hyperlipidemia     Hypertension     PVC's (premature ventricular contractions)        No past surgical history on file.    Review of patient's allergies indicates:  No Known Allergies    Current Facility-Administered Medications   Medication Dose Route Frequency Provider Last Rate Last Admin    aspirin chewable tablet 81 mg  81 mg Oral Daily Elvia Lloyd MD        chlorhexidine 0.12 % solution 15 mL  15 mL Mouth/Throat BID Liana Crum NP        famotidine (PF) injection 20 mg  20 mg Intravenous BID Liana Crum NP        fentaNYL 2500 mcg in 0.9% sodium chloride 250 mL infusion premix (titrating)  0-250 mcg/hr Intravenous Continuous Aurora Ruiz MD 20 mL/hr at 04/24/24 0600 200 mcg/hr at 04/24/24 0600    heparin 25,000 units in dextrose 5% (100 units/ml) IV bolus from bag LOW INTENSITY nomogram - [facility]  45.3 Units/kg Intravenous PRN Liana Crum NP        heparin 25,000 units in dextrose 5% (100 units/ml) IV bolus from bag LOW INTENSITY nomogram - [facility]  30 Units/kg Intravenous PRN Liana Crum NP        heparin 25,000 units in dextrose 5% 250 mL (100 units/mL) infusion LOW INTENSITY nomogram - [facility]  0-40 Units/kg/hr Intravenous Continuous Liana Crum NP 10.6 mL/hr at 04/24/24 0600 12 Units/kg/hr at 04/24/24 0600    lactated ringers infusion   Intravenous Continuous Liana Crum NP 75 mL/hr at 04/24/24 0300 Rate Verify at 04/24/24 0300    magnesium sulfate 2g in water 50mL IVPB (premix)  2 g Intravenous PRN Liana Crum NP   Stopped at 04/24/24 0610    magnesium sulfate 2g in water 50mL IVPB (premix)  4 g Intravenous PRN Liana Crum NP        mupirocin 2 % ointment   Nasal BID Elvia Lloyd MD   Given at 04/23/24 2323    NORepinephrine 4 mg in dextrose 5% 250 mL infusion (premix)  0-0.2 mcg/kg/min Intravenous Continuous Liana Crum NP 9.9 mL/hr at 04/24/24 0600 0.03 mcg/kg/min at  04/24/24 0600    piperacillin-tazobactam (ZOSYN) 4.5 g in dextrose 5 % in water (D5W) 100 mL IVPB (MB+)  4.5 g Intravenous Q8H Liana Crum NP   Stopped at 04/24/24 0334    potassium chloride 10 mEq in 100 mL IVPB  40 mEq Intravenous PRN Liana Crum NP        And    potassium chloride 10 mEq in 100 mL IVPB  60 mEq Intravenous PRN Liana Crum NP        And    potassium chloride 10 mEq in 100 mL IVPB  80 mEq Intravenous PRN Liana Crum NP        propofol (DIPRIVAN) 10 mg/mL infusion  0-50 mcg/kg/min Intravenous Continuous Liana Crum NP 13.2 mL/hr at 04/24/24 0600 25 mcg/kg/min at 04/24/24 0600    sodium chloride 0.9% flush 10 mL  10 mL Intravenous PRN Liana Crum NP         Family History    None       Tobacco Use    Smoking status: Never    Smokeless tobacco: Never   Substance and Sexual Activity    Alcohol use: Not on file    Drug use: Not on file    Sexual activity: Not on file     Review of Systems   Reason unable to perform ROS: as per hpi/per family.   Constitutional: Positive for malaise/fatigue.   HENT: Negative.     Eyes: Negative.    Cardiovascular:  Positive for palpitations.   Respiratory: Negative.     Endocrine: Negative.    Hematologic/Lymphatic: Negative.    Skin: Negative.    Musculoskeletal: Negative.    Gastrointestinal: Negative.    Genitourinary: Negative.    Neurological:  Positive for weakness.   Psychiatric/Behavioral: Negative.     Allergic/Immunologic: Negative.      Objective:     Vital Signs (Most Recent):  Temp: 99 °F (37.2 °C) (04/24/24 0729)  Pulse: 76 (04/24/24 0729)  Resp: 13 (04/24/24 0729)  BP: 96/61 (04/24/24 0600)  SpO2: 100 % (04/24/24 0729) Vital Signs (24h Range):  Temp:  [94.1 °F (34.5 °C)-99 °F (37.2 °C)] 99 °F (37.2 °C)  Pulse:  [58-99] 76  Resp:  [13-34] 13  SpO2:  [99 %-100 %] 100 %  BP: ()/() 96/61  Arterial Line BP: ()/(52-81) 105/58     Weight: 88.3 kg (194 lb 10.7 oz)  Body mass index is 28.75 kg/m².    SpO2:  100 %         Intake/Output Summary (Last 24 hours) at 4/24/2024 0804  Last data filed at 4/24/2024 0600  Gross per 24 hour   Intake 3405.38 ml   Output 250 ml   Net 3155.38 ml       Lines/Drains/Airways       Drain  Duration                  NG/OG Tube 04/23/24 2025 16 Fr. Center mouth <1 day         Urethral Catheter 04/23/24 2150 Temperature probe;Silicone 16 Fr. <1 day              Airway  Duration                  Airway - Non-Surgical 04/23/24 2005 Endotracheal Tube <1 day              Arterial Line  Duration             Arterial Line 04/23/24 2227 Right Radial <1 day              Peripheral Intravenous Line  Duration                  Peripheral IV - Single Lumen 04/23/24 2010 18 G Right Antecubital <1 day         Peripheral IV - Single Lumen 04/23/24 2015 18 G Left Antecubital <1 day         Peripheral IV - Single Lumen 04/23/24 2037 18 G Anterior;Left Forearm <1 day         Peripheral IV - Single Lumen 04/23/24 2037 20 G Posterior;Right Hand <1 day         Peripheral IV - Single Lumen 04/24/24 20 G Anterior;Right Forearm <1 day                     Physical Exam  Vitals and nursing note reviewed.   Constitutional:       Appearance: He is ill-appearing.      Comments: Intubated   HENT:      Head: Normocephalic and atraumatic.   Eyes:      Pupils: Pupils are equal, round, and reactive to light.   Cardiovascular:      Rate and Rhythm: Normal rate and regular rhythm.      Heart sounds: S1 normal and S2 normal. No murmur heard.  Abdominal:      Palpations: Abdomen is soft.   Musculoskeletal:      Right lower leg: No edema.      Left lower leg: No edema.   Neurological:      Comments: Awake, oriented, some mild short term memory loss   Psychiatric:         Mood and Affect: Mood normal.          Significant Labs: CMP   Recent Labs   Lab 04/23/24  2019 04/24/24  0336    135*   K 3.2* 4.4    108   CO2 20* 20*   * 132*   BUN 16 18   CREATININE 1.7* 1.9*   CALCIUM 9.8 9.0   PROT 7.1 5.9*   ALBUMIN 4.1  3.5   BILITOT 0.6 1.0   ALKPHOS 102 70   * 74*   * 102*   ANIONGAP 12 7*   , CBC   Recent Labs   Lab 04/23/24 2019 04/23/24 2238 04/24/24  0336   WBC 7.64 15.27* 10.72   HGB 16.5 16.1 14.5   HCT 46.7 45.4 41.0    210 210   , Troponin   Recent Labs   Lab 04/23/24 2019 04/23/24 2238 04/24/24  0336   TROPONINI 0.112* 6.820* 11.571*   , and All pertinent lab results from the last 24 hours have been reviewed.    Significant Imaging: Echocardiogram: Transthoracic echo (TTE) complete (Cupid Only): No results found for this or any previous visit. and EKG: Reviewed

## 2024-04-24 NOTE — ASSESSMENT & PLAN NOTE
- troponin 0.112->6.820  - likely 2/2 demand/ischemia following cardiac arrest  - heparin gtt  - lipid panel, A1c ordered

## 2024-04-24 NOTE — SUBJECTIVE & OBJECTIVE
Interval History: placed on PS and extubated this am   Went to cath lab as well       Objective:     Vital Signs (Most Recent):  Temp: 99.5 °F (37.5 °C) (04/24/24 1130)  Pulse: 72 (04/24/24 1415)  Resp: 10 (04/24/24 1415)  BP: (!) 140/69 (04/24/24 1330)  SpO2: 99 % (04/24/24 1415) Vital Signs (24h Range):  Temp:  [94.1 °F (34.5 °C)-99.5 °F (37.5 °C)] 99.5 °F (37.5 °C)  Pulse:  [58-99] 72  Resp:  [10-34] 10  SpO2:  [94 %-100 %] 99 %  BP: ()/() 140/69  Arterial Line BP: ()/(52-92) 141/70     Weight: 88 kg (194 lb 0.1 oz)  Body mass index is 28.65 kg/m².      Intake/Output Summary (Last 24 hours) at 4/24/2024 1426  Last data filed at 4/24/2024 0831  Gross per 24 hour   Intake 3599.77 ml   Output 250 ml   Net 3349.77 ml        Physical Exam  Vitals and nursing note reviewed.   Constitutional:       General: He is not in acute distress.  HENT:      Head: Normocephalic and atraumatic.   Eyes:      General:         Right eye: No discharge.         Left eye: No discharge.   Cardiovascular:      Rate and Rhythm: Normal rate and regular rhythm.      Heart sounds: No murmur heard.  Pulmonary:      Effort: No respiratory distress.      Breath sounds: No wheezing or rales.   Abdominal:      General: There is no distension.   Musculoskeletal:         General: No swelling or tenderness.      Cervical back: Neck supple.   Neurological:      General: No focal deficit present.      Mental Status: He is alert and oriented to person, place, and time.           Review of Systems   Reason unable to perform ROS: initially on vent.   Constitutional: Negative.    HENT: Negative.     Respiratory: Negative.     Cardiovascular: Negative.    Gastrointestinal: Negative.    Genitourinary: Negative.    Musculoskeletal: Negative.    Neurological: Negative.        Vents:  Vent Mode: (S) Spont (04/24/24 0729)  Set Rate: 18 BPM (04/24/24 0518)  Vt Set: 450 mL (04/24/24 0518)  Pressure Support: (S) 7 cmH20 (04/24/24 0729)  PEEP/CPAP:  5 cmH20 (04/24/24 0729)  Oxygen Concentration (%): (S) 32 (04/24/24 0824)  Peak Airway Pressure: 12 cmH20 (04/24/24 0729)  Plateau Pressure: 18 cmH20 (04/24/24 0729)  Total Ve: 8.24 L/m (04/24/24 0729)  Negative Inspiratory Force (cm H2O): 0 (04/24/24 0729)  F/VT Ratio<105 (RSBI): (!) 29.82 (04/24/24 0729)    Lines/Drains/Airways       Drain  Duration                  Urethral Catheter 04/23/24 2150 Temperature probe;Silicone 16 Fr. <1 day              Arterial Line  Duration             Arterial Line 04/23/24 2227 Right Radial <1 day              Peripheral Intravenous Line  Duration                  Peripheral IV - Single Lumen 04/23/24 2010 18 G Right Antecubital <1 day         Peripheral IV - Single Lumen 04/23/24 2015 18 G Left Antecubital <1 day         Peripheral IV - Single Lumen 04/23/24 2037 18 G Anterior;Left Forearm <1 day         Peripheral IV - Single Lumen 04/23/24 2037 20 G Posterior;Right Hand <1 day         Peripheral IV - Single Lumen 04/24/24 20 G Anterior;Right Forearm <1 day                    Significant Labs:    CBC/Anemia Profile:  Recent Labs   Lab 04/23/24 2019 04/23/24 2238 04/24/24  0336   WBC 7.64 15.27* 10.72   HGB 16.5 16.1 14.5   HCT 46.7 45.4 41.0    210 210   MCV 91 90 90   RDW 12.8 12.8 12.7        Chemistries:  Recent Labs   Lab 04/23/24 2019 04/23/24 2238 04/24/24  0336     --  135*   K 3.2*  --  4.4     --  108   CO2 20*  --  20*   BUN 16  --  18   CREATININE 1.7*  --  1.9*   CALCIUM 9.8  --  9.0   ALBUMIN 4.1  --  3.5   PROT 7.1  --  5.9*   BILITOT 0.6  --  1.0   ALKPHOS 102  --  70   *  --  102*   *  --  74*   MG  --  2.2 1.8   PHOS  --   --  3.6       All pertinent labs within the past 24 hours have been reviewed.    Significant Imaging:  I have reviewed all pertinent imaging results/findings within the past 24 hours.

## 2024-04-24 NOTE — ASSESSMENT & PLAN NOTE
- etiology unclear  - suspect arrhythmia event  - echo ordered  - sedation vacation revealed patient following commands, therefore no TTM initiated.

## 2024-04-24 NOTE — ASSESSMENT & PLAN NOTE
Patient with acute kidney injury/acute renal failure likely due to low flow state/cardiac arrest. Baseline creatinine unknown - Labs reviewed- Renal function/electrolytes with Estimated Creatinine Clearance: 55.9 mL/min (A) (based on SCr of 1.7 mg/dL (H)). according to latest data. Monitor urine output and serial BMP and adjust therapy as needed. Avoid nephrotoxins and renally dose meds for GFR listed above.

## 2024-04-24 NOTE — ASSESSMENT & PLAN NOTE
Patient is likely to have ischemic hepatitis as evidenced by abnormalities in AST and ALT. Etiology includes cardiogenic shock. Continue to monitor liver function while treating underlying condition. Daily labs to include CMP. Avoid hepatotoxins.    AST   Date Value Ref Range Status   04/23/2024 108 (H) 10 - 40 U/L Final     ALT   Date Value Ref Range Status   04/23/2024 130 (H) 10 - 44 U/L Final

## 2024-04-24 NOTE — ASSESSMENT & PLAN NOTE
- vent bundle in place  - VAP prophylaxis  - wean settings as tolerated  - prn ABG  - SAT/SBT daily    4/24 off vent now   Extubated this am

## 2024-04-24 NOTE — ASSESSMENT & PLAN NOTE
- holding medications acutely  - patient was on acebutolol, took himself off 6-8 wks ago  - add meds as needed

## 2024-04-24 NOTE — PLAN OF CARE
Pt AAOx4 on room air. VSS. Extubated this am. Pt went to cath lab this am. 11 ml air in TR band. Removed 3ml of air. See note by RCISTO Lugo. TR removed this evening. Chen remains in place. Adequate UOP for shift. On cardiac diet. Troponin trending down. POC reviewed with family at bedside.

## 2024-04-24 NOTE — ASSESSMENT & PLAN NOTE
- etiology unclear  - suspect arrhythmia event  - echo ordered  - sedation vacation revealed patient following commands, therefore no TTM initiated. \    4/24- woke up this am   Heart cath done, nonobstructive disease \  EP consulted   Plan per cards

## 2024-04-24 NOTE — ASSESSMENT & PLAN NOTE
-Reportedly had VT/VF upon EMS arrival  -K initially low at 3.2, has been repleted  -Known history of PVC's, EF previously normal  -Repeat TTE pending  -Continue supportive care  -Ischemic workup/LHC today  -EP also consulted for AICD consideration

## 2024-04-24 NOTE — BRIEF OP NOTE
[facility] Lab (Hospital)  Brief Operative Note  Cardiology    SUMMARY     Surgery Date: 4/24/2024     Surgeons and Role:     * Fitz Ochoa MD - Primary    Assisting Surgeon: None    Pre-op Diagnosis:  NSTEMI (non-ST elevated myocardial infarction) [I21.4]    Post-op Diagnosis: Post-Op Diagnosis Codes:     * NSTEMI (non-ST elevated myocardial infarction) [I21.4]    Procedure Performed:     Procedure(s) (LRB):  Left heart cath (Left)  ANGIOGRAM, CORONARY ARTERY (N/A)  Ventriculogram, Left    Technical Procedures Used:     Operative Findings:   Results for orders placed during the hospital encounter of 04/23/24    Cardiac catheterization (Needs Review)  This result has not been signed. Information might be incomplete.    Conclusion    The ejection fraction was calculated to be 65%.    The pre-procedure left ventricular end diastolic pressure was 30.    The post-procedure left ventricular end diastolic pressure was 35.    The estimated blood loss was none.    There was non-obstructive coronary artery disease..    The filling pressures on the left were moderately elevated.    Mild mid LAD intramyocardial bridging    LV-AO gradient of 30 mmhg, check echo    The procedure log was documented by Documenter: Valerie Villavicencio RN and verified by Fitz Ochoa MD.    Date: 4/24/2024  Time: 12:19 PM    Of note mentioned that he smokes uses medicinal marijuana ?    Estimated Blood Loss: * No values recorded between 4/24/2024 11:58 AM and 4/24/2024 12:14 PM *         Specimens:   Specimen (24h ago, onward)      None

## 2024-04-24 NOTE — PLAN OF CARE
[facility] ([facility])  Initial Discharge Assessment       Primary Care Provider: ANNE Ford Jr. NP    Admission Diagnosis: Cardiac arrest [I46.9]  V-tach [I47.20]  SLIME (acute kidney injury) [N17.9]    Admission Date: 4/23/2024  Expected Discharge Date:     Transition of Care Barriers: None    Payor: [employer] / Plan: BCBS BLUE SAVER PPO - HD / Product Type: PPO /     Extended Emergency Contact Information  Primary Emergency Contact: vidya patton  Mobile Phone: [phone]  Relation: Spouse  Preferred language: English   needed? No    Discharge Plan A: Home with family       No Pharmacies Listed    Initial Assessment (most recent)       Adult Discharge Assessment - 04/24/24 1100          Discharge Assessment    Assessment Type Discharge Planning Assessment     Confirmed/corrected address, phone number and insurance Yes     Confirmed Demographics Correct on Facesheet     Source of Information family     Communicated DAVID with patient/caregiver Date not available/Unable to determine     Reason For Admission cardiac arrest     People in Home child(angela), adult;spouse     Facility Arrived From: home     Do you expect to return to your current living situation? Yes     Do you have help at home or someone to help you manage your care at home? Yes     Who are your caregiver(s) and their phone number(s)? spouse, Vidya     Prior to hospitilization cognitive status: Alert/Oriented     Current cognitive status: Alert/Oriented   intermittent confusion    Walking or Climbing Stairs Difficulty no     Dressing/Bathing Difficulty no     Home Accessibility wheelchair accessible     Home Layout Able to live on 1st floor     Equipment Currently Used at Home none     Readmission within 30 days? No     Patient currently being followed by outpatient case management? No     Do you currently have service(s) that help you manage your care at home? No     Do you take prescription medications? Yes      Do you have prescription coverage? Yes     Coverage Commercial     Do you have any problems affording any of your prescribed medications? No     Is the patient taking medications as prescribed? yes     Who is going to help you get home at discharge? Spouse, Vidya     How do you get to doctors appointments? car, drives self     Are you on dialysis? No     Do you take coumadin? No     Discharge Plan A Home with family     DME Needed Upon Discharge  none     Discharge Plan discussed with: Spouse/sig other     Transition of Care Barriers None                   Anticipated DC dispo: home with family   Prior Level of Function: independent with ADLs, works, drives   People in home: spouse and 2 daughters (18, 24)    Comments:  CM met with patient's spouse at bedside to introduce role and discuss discharge planning. Spouse will be help at home and can provide transport at time of discharge. Patient does not use any DME at home. Confirmed demographics, insurance, and emergency contacts. CM discharge needs depends on hospital progress. CM will continue following to assist with other needs.

## 2024-04-24 NOTE — HOSPITAL COURSE
4/24 placed on PS this am ,wide awake and now extubated   Cath lab     4/25 - no acute events  No complaints

## 2024-04-24 NOTE — ASSESSMENT & PLAN NOTE
Patient has hypokalemia which is Acute and currently uncontrolled. Most recent potassium levels reviewed-   Lab Results   Component Value Date    K 3.2 (L) 04/23/2024     - Will continue potassium replacement per protocol and recheck repeat levels after replacement completed  - check mag

## 2024-04-24 NOTE — ASSESSMENT & PLAN NOTE
- ?R on T or V-tach prior to arrest  - cardiology consulted and appreciate recs  - amio if VT   - monitor and replace electrolytes prn    4/24 see cardiac arrest

## 2024-04-25 DIAGNOSIS — I46.9 CARDIAC ARREST: Primary | ICD-10-CM

## 2024-04-25 PROBLEM — Z99.11 ON MECHANICALLY ASSISTED VENTILATION: Status: RESOLVED | Noted: 2024-04-24 | Resolved: 2024-04-25

## 2024-04-25 LAB
ALBUMIN SERPL BCP-MCNC: 3.2 G/DL (ref 3.5–5.2)
ALP SERPL-CCNC: 63 U/L (ref 55–135)
ALT SERPL W/O P-5'-P-CCNC: 67 U/L (ref 10–44)
ANION GAP SERPL CALC-SCNC: 7 MMOL/L (ref 8–16)
AST SERPL-CCNC: 39 U/L (ref 10–40)
BASOPHILS # BLD AUTO: 0.03 K/UL (ref 0–0.2)
BASOPHILS NFR BLD: 0.3 % (ref 0–1.9)
BILIRUB SERPL-MCNC: 1.5 MG/DL (ref 0.1–1)
BUN SERPL-MCNC: 20 MG/DL (ref 6–20)
CALCIUM SERPL-MCNC: 9.2 MG/DL (ref 8.7–10.5)
CHLORIDE SERPL-SCNC: 105 MMOL/L (ref 95–110)
CO2 SERPL-SCNC: 26 MMOL/L (ref 23–29)
CREAT SERPL-MCNC: 1.8 MG/DL (ref 0.5–1.4)
CRP SERPL-MCNC: 70.58 MG/L (ref 0–3.19)
CRP SERPL-MCNC: 75.1 MG/L (ref 0–8.2)
DIFFERENTIAL METHOD BLD: ABNORMAL
EOSINOPHIL # BLD AUTO: 0 K/UL (ref 0–0.5)
EOSINOPHIL NFR BLD: 0.2 % (ref 0–8)
ERYTHROCYTE [DISTWIDTH] IN BLOOD BY AUTOMATED COUNT: 12.9 % (ref 11.5–14.5)
ERYTHROCYTE [SEDIMENTATION RATE] IN BLOOD BY WESTERGREN METHOD: 45 MM/HR (ref 0–10)
EST. GFR  (NO RACE VARIABLE): 45 ML/MIN/1.73 M^2
GLUCOSE SERPL-MCNC: 111 MG/DL (ref 70–110)
HCT VFR BLD AUTO: 38.5 % (ref 40–54)
HGB BLD-MCNC: 13.7 G/DL (ref 14–18)
IMM GRANULOCYTES # BLD AUTO: 0.02 K/UL (ref 0–0.04)
IMM GRANULOCYTES NFR BLD AUTO: 0.2 % (ref 0–0.5)
LYMPHOCYTES # BLD AUTO: 1.1 K/UL (ref 1–4.8)
LYMPHOCYTES NFR BLD: 12.3 % (ref 18–48)
MAGNESIUM SERPL-MCNC: 2.2 MG/DL (ref 1.6–2.6)
MCH RBC QN AUTO: 32.2 PG (ref 27–31)
MCHC RBC AUTO-ENTMCNC: 35.6 G/DL (ref 32–36)
MCV RBC AUTO: 90 FL (ref 82–98)
MONOCYTES # BLD AUTO: 0.8 K/UL (ref 0.3–1)
MONOCYTES NFR BLD: 9.2 % (ref 4–15)
NEUTROPHILS # BLD AUTO: 6.7 K/UL (ref 1.8–7.7)
NEUTROPHILS NFR BLD: 77.8 % (ref 38–73)
NRBC BLD-RTO: 0 /100 WBC
PHOSPHATE SERPL-MCNC: 2.9 MG/DL (ref 2.7–4.5)
PLATELET # BLD AUTO: 171 K/UL (ref 150–450)
PMV BLD AUTO: 10.1 FL (ref 9.2–12.9)
POCT GLUCOSE: 172 MG/DL (ref 70–110)
POTASSIUM SERPL-SCNC: 3.5 MMOL/L (ref 3.5–5.1)
PROT SERPL-MCNC: 5.8 G/DL (ref 6–8.4)
RBC # BLD AUTO: 4.26 M/UL (ref 4.6–6.2)
SODIUM SERPL-SCNC: 138 MMOL/L (ref 136–145)
WBC # BLD AUTO: 8.63 K/UL (ref 3.9–12.7)

## 2024-04-25 PROCEDURE — 94761 N-INVAS EAR/PLS OXIMETRY MLT: CPT

## 2024-04-25 PROCEDURE — 84100 ASSAY OF PHOSPHORUS: CPT

## 2024-04-25 PROCEDURE — 25000003 PHARM REV CODE 250: Performed by: INTERNAL MEDICINE

## 2024-04-25 PROCEDURE — 86140 C-REACTIVE PROTEIN: CPT | Performed by: NURSE PRACTITIONER

## 2024-04-25 PROCEDURE — 82040 ASSAY OF SERUM ALBUMIN: CPT | Performed by: INTERNAL MEDICINE

## 2024-04-25 PROCEDURE — 83735 ASSAY OF MAGNESIUM: CPT

## 2024-04-25 PROCEDURE — 63600175 PHARM REV CODE 636 W HCPCS

## 2024-04-25 PROCEDURE — 87476 LYME DIS DNA AMP PROBE: CPT | Performed by: NURSE PRACTITIONER

## 2024-04-25 PROCEDURE — 63600175 PHARM REV CODE 636 W HCPCS: Performed by: INTERNAL MEDICINE

## 2024-04-25 PROCEDURE — 85651 RBC SED RATE NONAUTOMATED: CPT | Performed by: NURSE PRACTITIONER

## 2024-04-25 PROCEDURE — 25000003 PHARM REV CODE 250: Performed by: PHYSICIAN ASSISTANT

## 2024-04-25 PROCEDURE — 86141 C-REACTIVE PROTEIN HS: CPT | Performed by: NURSE PRACTITIONER

## 2024-04-25 PROCEDURE — 86618 LYME DISEASE ANTIBODY: CPT | Performed by: NURSE PRACTITIONER

## 2024-04-25 PROCEDURE — 85025 COMPLETE CBC W/AUTO DIFF WBC: CPT | Performed by: INTERNAL MEDICINE

## 2024-04-25 PROCEDURE — 99291 CRITICAL CARE FIRST HOUR: CPT | Mod: ,,, | Performed by: INTERNAL MEDICINE

## 2024-04-25 PROCEDURE — 25000003 PHARM REV CODE 250

## 2024-04-25 PROCEDURE — 21400001 HC TELEMETRY ROOM

## 2024-04-25 PROCEDURE — 80053 COMPREHEN METABOLIC PANEL: CPT

## 2024-04-25 PROCEDURE — 86617 LYME DISEASE ANTIBODY: CPT | Mod: 59 | Performed by: NURSE PRACTITIONER

## 2024-04-25 RX ORDER — METOPROLOL SUCCINATE 25 MG/1
25 TABLET, EXTENDED RELEASE ORAL DAILY
Status: DISCONTINUED | OUTPATIENT
Start: 2024-04-25 | End: 2024-04-26 | Stop reason: HOSPADM

## 2024-04-25 RX ORDER — AMOXICILLIN 250 MG
1 CAPSULE ORAL 2 TIMES DAILY
Status: DISCONTINUED | OUTPATIENT
Start: 2024-04-25 | End: 2024-04-26 | Stop reason: HOSPADM

## 2024-04-25 RX ORDER — POTASSIUM CHLORIDE 20 MEQ/1
40 TABLET, EXTENDED RELEASE ORAL ONCE
Qty: 2 TABLET | Refills: 0 | Status: COMPLETED | OUTPATIENT
Start: 2024-04-25 | End: 2024-04-25

## 2024-04-25 RX ORDER — TESTOSTERONE CYPIONATE 200 MG/ML
200 INJECTION, SOLUTION INTRAMUSCULAR
COMMUNITY

## 2024-04-25 RX ADMIN — PIPERACILLIN SODIUM AND TAZOBACTAM SODIUM 4.5 G: 4; .5 INJECTION, POWDER, FOR SOLUTION INTRAVENOUS at 08:04

## 2024-04-25 RX ADMIN — MUPIROCIN: 20 OINTMENT TOPICAL at 09:04

## 2024-04-25 RX ADMIN — ALPRAZOLAM 0.5 MG: 0.5 TABLET ORAL at 03:04

## 2024-04-25 RX ADMIN — ENOXAPARIN SODIUM 40 MG: 40 INJECTION SUBCUTANEOUS at 04:04

## 2024-04-25 RX ADMIN — ASPIRIN 81 MG CHEWABLE TABLET 81 MG: 81 TABLET CHEWABLE at 08:04

## 2024-04-25 RX ADMIN — SENNOSIDES AND DOCUSATE SODIUM 1 TABLET: 50; 8.6 TABLET ORAL at 09:04

## 2024-04-25 RX ADMIN — METOPROLOL SUCCINATE 25 MG: 25 TABLET, EXTENDED RELEASE ORAL at 04:04

## 2024-04-25 RX ADMIN — POTASSIUM CHLORIDE 40 MEQ: 1500 TABLET, EXTENDED RELEASE ORAL at 09:04

## 2024-04-25 RX ADMIN — PIPERACILLIN SODIUM AND TAZOBACTAM SODIUM 4.5 G: 4; .5 INJECTION, POWDER, FOR SOLUTION INTRAVENOUS at 12:04

## 2024-04-25 RX ADMIN — MUPIROCIN: 20 OINTMENT TOPICAL at 08:04

## 2024-04-25 NOTE — SUBJECTIVE & OBJECTIVE
Past Medical History:   Diagnosis Date    Diverticulitis     Hyperlipidemia     Hypertension     On mechanically assisted ventilation 04/24/2024    PVC's (premature ventricular contractions)        Past Surgical History:   Procedure Laterality Date    CORONARY ANGIOGRAPHY N/A 4/24/2024    Procedure: ANGIOGRAM, CORONARY ARTERY;  Surgeon: Fitz Ochoa MD;  Location: [facility] CATH LAB;  Service: Cardiology;  Laterality: N/A;    LEFT HEART CATHETERIZATION Left 4/24/2024    Procedure: Left heart cath;  Surgeon: Fitz Ochoa MD;  Location: [facility] CATH LAB;  Service: Cardiology;  Laterality: Left;    VENTRICULOGRAM, LEFT  4/24/2024    Procedure: Ventriculogram, Left;  Surgeon: Fitz Ochoa MD;  Location: [facility] CATH LAB;  Service: Cardiology;;       Review of patient's allergies indicates:  No Known Allergies    Current Facility-Administered Medications   Medication Dose Route Frequency Provider Last Rate Last Admin    acetaminophen tablet 650 mg  650 mg Oral Q4H PRN Fitz Ochoa MD        ALPRAZolam tablet 0.5 mg  0.5 mg Oral Nightly PRN Liana Crum NP   0.5 mg at 04/25/24 1514    aspirin chewable tablet 81 mg  81 mg Oral Daily Elvia Lloyd MD   81 mg at 04/25/24 0817    enoxaparin injection 40 mg  40 mg Subcutaneous Q24H (prophylaxis, 1700) Elvia Lloyd MD   40 mg at 04/25/24 1628    influenza (QUADRIVALENT PF) vaccine 0.5 mL  0.5 mL Intramuscular vaccine x 1 dose Elvia Lloyd MD        labetaloL injection 10 mg  10 mg Intravenous Q6H PRN Elvia Lloyd MD   10 mg at 04/24/24 0837    magnesium sulfate 2g in water 50mL IVPB (premix)  2 g Intravenous PRN Liana Crum NP   Stopped at 04/24/24 0610    magnesium sulfate 2g in water 50mL IVPB (premix)  4 g Intravenous PRN Liana Crum NP        metoprolol succinate (TOPROL-XL) 24 hr tablet 25 mg  25 mg Oral Daily Amisha Park PA-C   25 mg at 04/25/24 1628    mupirocin 2 % ointment   Nasal BID Prema  Elvia MAYA MD   Given at 04/25/24 0808    ondansetron disintegrating tablet 8 mg  8 mg Oral Q8H PRN Fitz Ochoa MD        pneumoc 20-jeovanny conj-dip cr(PF) (PREVNAR-20 (PF)) injection Syrg 0.5 mL  0.5 mL Intramuscular vaccine x 1 dose Elvia Lloyd MD        potassium chloride 10 mEq in 100 mL IVPB  40 mEq Intravenous PRN Liana Crum NP        And    potassium chloride 10 mEq in 100 mL IVPB  60 mEq Intravenous PRN Liana Crum NP        And    potassium chloride 10 mEq in 100 mL IVPB  80 mEq Intravenous PRN Liana Crum NP        sodium chloride 0.9% flush 10 mL  10 mL Intravenous PRN Liana Crum NP         Family History    None       Tobacco Use    Smoking status: Never    Smokeless tobacco: Never   Substance and Sexual Activity    Alcohol use: Not on file    Drug use: Not on file    Sexual activity: Not on file     Review of Systems   Constitutional:  Negative for activity change, appetite change, chills, fatigue and fever.   HENT: Negative.     Respiratory:  Negative for cough, chest tightness and shortness of breath.    Cardiovascular:  Positive for palpitations. Negative for chest pain and leg swelling.   Gastrointestinal:  Positive for constipation (no BM since admission). Negative for abdominal pain, diarrhea, nausea and vomiting.   Genitourinary:  Negative for difficulty urinating.   Musculoskeletal: Negative.    Skin: Negative.    Allergic/Immunologic: Negative.    Neurological:  Negative for dizziness, syncope and light-headedness.   Hematological: Negative.    Psychiatric/Behavioral: Negative.       Objective:     Vital Signs (Most Recent):  Temp: 97.7 °F (36.5 °C) (04/25/24 1607)  Pulse: 87 (04/25/24 1607)  Resp: 18 (04/25/24 1607)  BP: 139/86 (04/25/24 1607)  SpO2: 99 % (04/25/24 1607) Vital Signs (24h Range):  Temp:  [97.7 °F (36.5 °C)-98.9 °F (37.2 °C)] 97.7 °F (36.5 °C)  Pulse:  [77-88] 87  Resp:  [8-31] 18  SpO2:  [92 %-100 %] 99 %  BP: (123-139)/(75-86)  139/86  Arterial Line BP: ()/(59-84) 174/82     Weight: 88 kg (194 lb 0.1 oz)  Body mass index is 28.65 kg/m².     Physical Exam  Vitals and nursing note reviewed.   Constitutional:       General: He is not in acute distress.     Appearance: Normal appearance. He is not ill-appearing.   HENT:      Head: Normocephalic and atraumatic.      Mouth/Throat:      Pharynx: Oropharynx is clear.   Eyes:      General: No scleral icterus.     Extraocular Movements: Extraocular movements intact.      Conjunctiva/sclera: Conjunctivae normal.   Cardiovascular:      Rate and Rhythm: Normal rate and regular rhythm.      Heart sounds: No murmur heard.     No friction rub. No gallop.   Pulmonary:      Effort: Pulmonary effort is normal.      Breath sounds: Normal breath sounds. No wheezing, rhonchi or rales.      Comments: On room air   Abdominal:      General: Bowel sounds are normal. There is no distension.      Palpations: Abdomen is soft.      Tenderness: There is no abdominal tenderness. There is no guarding or rebound.   Genitourinary:     Comments: deferred  Musculoskeletal:      Right lower leg: No edema.      Left lower leg: No edema.   Neurological:      General: No focal deficit present.      Mental Status: He is alert and oriented to person, place, and time. Mental status is at baseline.   Psychiatric:         Mood and Affect: Mood normal.         Behavior: Behavior normal.          Significant Labs: All pertinent labs within the past 24 hours have been reviewed.    Significant Imaging: I have reviewed all pertinent imaging results/findings within the past 24 hours.

## 2024-04-25 NOTE — ASSESSMENT & PLAN NOTE
Patient has hypokalemia which is Acute and currently uncontrolled. Most recent potassium levels reviewed-   Lab Results   Component Value Date    K 3.5 04/25/2024     - Will continue potassium replacement per protocol and recheck repeat levels after replacement completed  - check mag    4/25 replace as needed

## 2024-04-25 NOTE — ASSESSMENT & PLAN NOTE
- etiology unclear  - suspect arrhythmia event  - echo ordered  - sedation vacation revealed patient following commands, therefore no TTM initiated. \    4/24- woke up this am   Heart cath done, nonobstructive disease \  EP consulted   Plan per cards    4/25 no further episodes  EP consulted   Await recs

## 2024-04-25 NOTE — SUBJECTIVE & OBJECTIVE
Review of Systems   Constitutional: Positive for malaise/fatigue.   HENT: Negative.     Eyes: Negative.    Cardiovascular: Negative.    Respiratory: Negative.     Endocrine: Negative.    Hematologic/Lymphatic: Negative.    Skin: Negative.    Musculoskeletal: Negative.    Gastrointestinal: Negative.    Genitourinary: Negative.    Neurological:  Positive for disturbances in coordination.   Psychiatric/Behavioral: Negative.     Allergic/Immunologic: Negative.      Objective:     Vital Signs (Most Recent):  Temp: 98.8 °F (37.1 °C) (04/25/24 1100)  Pulse: 83 (04/25/24 1400)  Resp: 17 (04/25/24 1400)  BP: 123/75 (04/25/24 0000)  SpO2: 100 % (04/25/24 1400) Vital Signs (24h Range):  Temp:  [97.8 °F (36.6 °C)-98.9 °F (37.2 °C)] 98.8 °F (37.1 °C)  Pulse:  [75-88] 83  Resp:  [8-31] 17  SpO2:  [92 %-100 %] 100 %  BP: (123)/(75) 123/75  Arterial Line BP: ()/() 174/82     Weight: 88 kg (194 lb 0.1 oz)  Body mass index is 28.65 kg/m².     SpO2: 100 %         Intake/Output Summary (Last 24 hours) at 4/25/2024 1500  Last data filed at 4/25/2024 1200  Gross per 24 hour   Intake 712.63 ml   Output 2810 ml   Net -2097.37 ml       Lines/Drains/Airways       Drain  Duration                  Urethral Catheter 04/23/24 2150 Temperature probe;Silicone 16 Fr. 1 day              Arterial Line  Duration             Arterial Line 04/23/24 2227 Right Radial 1 day              Peripheral Intravenous Line  Duration                  Peripheral IV - Single Lumen 04/23/24 2010 18 G Right Antecubital 1 day         Peripheral IV - Single Lumen 04/23/24 2015 18 G Left Antecubital 1 day         Peripheral IV - Single Lumen 04/23/24 2037 18 G Anterior;Left Forearm 1 day         Peripheral IV - Single Lumen 04/23/24 2037 20 G Posterior;Right Hand 1 day         Peripheral IV - Single Lumen 04/24/24 20 G Anterior;Right Forearm 1 day                       Physical Exam  Vitals and nursing note reviewed.   Constitutional:       General: He is  not in acute distress.     Appearance: Normal appearance. He is well-developed. He is not diaphoretic.   HENT:      Head: Normocephalic and atraumatic.   Eyes:      General:         Right eye: No discharge.         Left eye: No discharge.      Pupils: Pupils are equal, round, and reactive to light.   Cardiovascular:      Rate and Rhythm: Normal rate and regular rhythm.      Heart sounds: Normal heart sounds, S1 normal and S2 normal. No murmur heard.  Pulmonary:      Effort: Pulmonary effort is normal. No respiratory distress.      Breath sounds: Normal breath sounds.   Abdominal:      General: There is no distension.   Musculoskeletal:      Right lower leg: No edema.      Left lower leg: No edema.   Skin:     General: Skin is warm and dry.      Findings: No erythema.      Comments: L radial access site C/D/I; intact pulse, no hematoma   Neurological:      General: No focal deficit present.      Mental Status: He is alert and oriented to person, place, and time.   Psychiatric:         Mood and Affect: Mood normal.         Behavior: Behavior normal.            Significant Labs: CMP   Recent Labs   Lab 04/23/24 2019 04/24/24 0336 04/25/24  0359    135* 138   K 3.2* 4.4 3.5    108 105   CO2 20* 20* 26   * 132* 111*   BUN 16 18 20   CREATININE 1.7* 1.9* 1.8*   CALCIUM 9.8 9.0 9.2   PROT 7.1 5.9* 5.8*   ALBUMIN 4.1 3.5 3.2*   BILITOT 0.6 1.0 1.5*   ALKPHOS 102 70 63   * 74* 39   * 102* 67*   ANIONGAP 12 7* 7*   , CBC   Recent Labs   Lab 04/23/24 2238 04/24/24 0336 04/25/24  0359   WBC 15.27* 10.72 8.63   HGB 16.1 14.5 13.7*   HCT 45.4 41.0 38.5*    210 171   , Troponin   Recent Labs   Lab 04/24/24 0336 04/24/24  1016 04/24/24  1617   TROPONINI 11.571* 3.761* 2.488*   , and All pertinent lab results from the last 24 hours have been reviewed.    Significant Imaging: Echocardiogram: Transthoracic echo (TTE) complete (Cupid Only):   Results for orders placed or performed during the  hospital encounter of 04/23/24   Echo   Result Value Ref Range    BSA 2.07 m2    LVOT stroke volume 73.13 cm3    LVIDd 4.34 3.5 - 6.0 cm    LV Systolic Volume 34.66 mL    LV Systolic Volume Index 17.0 mL/m2    LVIDs 2.99 2.1 - 4.0 cm    LV Diastolic Volume 84.84 mL    LV Diastolic Volume Index 41.59 mL/m2    IVS 1.19 (A) 0.6 - 1.1 cm    LVOT diameter 1.95 cm    LVOT area 3.0 cm2    FS 31 28 - 44 %    Left Ventricle Relative Wall Thickness 0.52 cm    Posterior Wall 1.13 (A) 0.6 - 1.1 cm    LV mass 178.37 g    LV Mass Index 87 g/m2    MV Peak E [place] 1.35 m/s    TDI LATERAL 0.13 m/s    TDI SEPTAL 0.11 m/s    E/E' ratio 11.25 m/s    MV Peak A [place] 0.91 m/s    E/A ratio 1.48     IVRT 49.48 msec    E wave deceleration time 151.19 msec    LV SEPTAL E/E' RATIO 12.27 m/s    LV LATERAL E/E' RATIO 10.38 m/s    LVOT peak [place] 1.30 m/s    Left Ventricular Outflow Tract Mean Velocity 1.07 cm/s    Left Ventricular Outflow Tract Mean Gradient 4.87 mmHg    RVOT peak VTI 15.7 cm    TAPSE 2.05 cm    LA size 3.67 cm    Left Atrium Minor Axis 5.21 cm    Left Atrium Major Axis 5.13 cm    RA Major Axis 4.40 cm    AV mean gradient 7 mmHg    AV peak gradient 12 mmHg    Ao peak [place] 1.72 m/s    Ao VTI 29.40 cm    LVOT peak VTI 24.50 cm    AV valve area 2.49 cm²    AV Velocity Ratio 0.76     AV index (prosthetic) 0.83     RONNI by Velocity Ratio 2.26 cm²    Mr max [place] 3.09 m/s    MV stenosis pressure 1/2 time 43.85 ms    MV valve area p 1/2 method 5.02 cm2    PV mean gradient 3 mmHg    RVOT peak [place] 1.02 m/s    Ao root annulus 3.12 cm    STJ 3.32 cm    Ascending aorta 3.18 cm    IVC diameter 1.95 cm    Mean e' 0.12 m/s    ZLVIDS -1.74     ZLVIDD -3.37     LA Volume Index 26.9 mL/m2    LA volume 54.83 cm3    LA WIDTH 3.4 cm    RA Width 2.8 cm    Narrative      Left Ventricle: The left ventricle is normal in size. Normal wall   thickness. There is concentric remodeling. There is normal systolic   function with a visually estimated ejection fraction  of 60 - 65%. Grade II   diastolic dysfunction. Elevated left ventricular filling pressure.    Right Ventricle: Right ventricle was not well visualized due to poor   acoustic window. Right ventricle wall motion  is unable to be assessed.   Systolic function could not be assesed.    Aortic Valve: Aortic valve peak velocity is 1.72 m/s. Mean gradient is   7 mmHg.    Mitral Valve: There is mild regurgitation.    IVC/SVC: Patient is ventilated, cannot use IVC diameter to estimate   right atrial pressure.      and EKG: Reviewed

## 2024-04-25 NOTE — ASSESSMENT & PLAN NOTE
Chronic, controlled. Latest blood pressure and vitals reviewed-     Temp:  [97.7 °F (36.5 °C)-98.9 °F (37.2 °C)]   Pulse:  [77-88]   Resp:  [8-31]   BP: (123-139)/(75-86)   SpO2:  [92 %-100 %]   Arterial Line BP: ()/(59-84) .   Home meds for hypertension were reviewed and noted below.       While in the hospital, will manage blood pressure as follows; Adjust home antihypertensive regimen as follows- continue metoprolol     Will utilize p.r.n. blood pressure medication only if patient's blood pressure greater than 180/110 and he develops symptoms such as worsening chest pain or shortness of breath.

## 2024-04-25 NOTE — ASSESSMENT & PLAN NOTE
-Troponin 0.112>6.820>11.571, continue to trend  -Continue ASA, heparin gtt  -BP soft, unable to further maximize med regimen, will do so as tolerated  -TTE pending  -[facility] planned pending improvement/stability     4/25/24  -LHC showed non-obs CAD

## 2024-04-25 NOTE — HPI
Pt is a 53-year-old male with a past medical history of palpitations/PVCs, HTN, hyperlipidemia, diverticulitis who initially presented to the ED on 04/23 via EMS after suffering cardiac arrest at home. Family found patient unresponsive, pulseless and apneic and started CPR. LKW 5 minutes prior. On EMS arrival, initial rhythm was pulseless ventricular tachycardia. ROSC was achieved after 1 defibrillation.  Family reported that patient complained of feeling palpitations on afternoon of presentation. He denied chest pain/discomfort, shortness of breath prior to episode. . Per chart review, pt had cardiology visit in January this year for eval of palpitations, 10 day Holter monitor at the time showing no arrhythmias other than PVC's with 8% burden. Patient was started on acebutolol in January, but took himself off of his medications approx 2 weeks later.  He reports he had been doing well since then, had improvement in symptoms after blood donation, no recurrence of palpitations until the day of arrest. Denies recent illness, changes to medications, or use of caffeine/workout supplements. Does note that he had done an intense workout on morning of arrest.      ED course revealed EKG with R axis deviation, QTc 437. CBC shows WBC 15.2. . Chemistry w/K 3.2, CO2 20, BUN/Cr 16/1.7, glucose 195, , . Troponin 0.112, BNP <10. Procalcitonin negative. UDS negative. Lactic acid 3.8. CXR w/no infiltrates, opacities, vascular congestion, or effusions.  CT head negative. Pt intubated, sedated in the ED.  Cardiology consulted and recommended heparin protocol and if any recurrence of NSVT to start amiodarone. He was admitted to ICU for further management. He was extubated on 04/24/2024. Underwent LHC on 04/24 which showed nonobstructive CAD. EP was consulted and pt is tentatively planned for AICD placement 04/30/2024. In the interim, cardiology recommend lifevest and return as outpatient to have AICD placed. Pt was deemed  stable for transfer to the floor on 04/25/2024. Hospital Medicine consulted. Pt seen upon transfer to the floor, multiple family members at bedside. Pt awake, alert. He denies CP, SOB, palpitations, dizziness at this time.

## 2024-04-25 NOTE — ASSESSMENT & PLAN NOTE
Patient with acute kidney injury/acute renal failure likely due to low flow state/cardiac arrest. Baseline creatinine unknown - Labs reviewed- Renal function/electrolytes with Estimated Creatinine Clearance: 52.7 mL/min (A) (based on SCr of 1.8 mg/dL (H)). according to latest data. Monitor urine output and serial BMP and adjust therapy as needed. Avoid nephrotoxins and renally dose meds for GFR listed above.    4/24 monitor   4/25 monitor

## 2024-04-25 NOTE — ASSESSMENT & PLAN NOTE
-Reportedly VT/VF upon arrival, ROSC obtained after CPR and defib x 1  -Stable since admission  -Troponin up-trending   -TTE pending  -[facility] today  -ASA, heparin gtt  -Will optimize meds post procedure     4/25/24  -[facility] showed non-obs CAD  -Continue ASA  -Statin as tolerated

## 2024-04-25 NOTE — ASSESSMENT & PLAN NOTE
- Cardiology consulted  - s/p heparin drip  - [facility] 04/24 with nonobstructive CAD   - continue ASA, not on statin due to elevated LFTs   - Trop peaked 11.571 and downtrending

## 2024-04-25 NOTE — ASSESSMENT & PLAN NOTE
-Reportedly had VT/VF upon EMS arrival  -K initially low at 3.2, has been repleted  -Known history of PVC's, EF previously normal  -Repeat TTE pending  -Continue supportive care  -Ischemic workup/LHC today  -EP also consulted for AICD consideration    4/25/24  -LHC with non-obs CAD  -Keep K > 4; Mg > 2  -EP on board, AICD planned

## 2024-04-25 NOTE — NURSING TRANSFER
Nursing Transfer Note      4/25/2024   5:38 PM    Nurse giving handoff: Marcia  Nurse receiving handoff:Mirian      Transfer: ICU to Tele    Transfer via wheelchair    Transfer with belongings and family       Order for Tele Monitor? Yes    4eyes on Skin: Yes    Any special needs or follow-up needed: Life vest/ Discharge    Patient belongings transferred with patient: Yes    Chart send with patient: Yes    Pt transferred without event

## 2024-04-25 NOTE — ASSESSMENT & PLAN NOTE
- vent bundle in place  - VAP prophylaxis  - wean settings as tolerated  - prn ABG  - SAT/SBT daily    4/24 off vent now   Extubated this am     4/25   Extubated  Antibiotics discontinued  F/u CXR

## 2024-04-25 NOTE — PROGRESS NOTES
"[facility] - Intensive Care ([facility])  Cardiology  Progress Note    Patient Name: Milind Melgar  MRN: [id number]  Admission Date: 4/23/2024  Hospital Length of Stay: 2 days  Code Status: Full Code   Attending Physician: Elvia Lloyd MD   Primary Care Physician: ANNE Ford Jr., NP  Expected Discharge Date:   Principal Problem:Cardiac arrest    Subjective:   HPI:  HPI obtained from patient and spouse at bedside      Mr. Melgar is a 53 year old male patient whose current medical conditions include HTN, hyperlipidemia, diverticulitis, PVC's, and low testosterone on therapy who presented to [facility] ED via EMS s/p cardiac arrest. Patient's family found him collapsed, unresponsive, pulseless, and apneic. CPR was initiated immediately and continued upon EMS arrival. Patient's initial rhythm was reportedly VT and ROSC was obtained after defibrillation x 1. Initial workup revealed lactic acidosis (3.8), hypokalemia (K 3.2), creatinine of 1.7, and troponin of 0.112>6.820 and patient was subsequently intubated and admitted to ICU for further evaluation and treatment. Cardiology consulted to assist with management. Patient seen and examined today, recently extubated. Awake, alert, some mild short term memory loss. He complained of palpitations/"feeling weird" per family prior to arrest. No apparent associated SOB or hayley CP. No prior history of CAD or MI. Very physically active and does Crossfit. Followed by [facility] cardiology (Dr. Philip) and previously noted to have 8% PVC burden and was placed on BB therapy but apparently discontinued the medication shortly after initiation as palpitations resolved completed after donating blood. Prior TTE on file showed normal EF, repeat pending. EKG reviewed, SR, rightward axis, no STEMI. Troponin bumped to 11.571, will continue to trend. LHC today. EP consulted for AICD.        Hospital Course:   4/25/24-Patient seen and examined today, resting in bed. Feels ok. LHC showed non-obs " CAD. Labs reviewed/stable. Occasional PVC's noted on telemetry. EP on board, AICD planned.         Review of Systems   Constitutional: Positive for malaise/fatigue.   HENT: Negative.     Eyes: Negative.    Cardiovascular: Negative.    Respiratory: Negative.     Endocrine: Negative.    Hematologic/Lymphatic: Negative.    Skin: Negative.    Musculoskeletal: Negative.    Gastrointestinal: Negative.    Genitourinary: Negative.    Neurological:  Positive for disturbances in coordination.   Psychiatric/Behavioral: Negative.     Allergic/Immunologic: Negative.      Objective:     Vital Signs (Most Recent):  Temp: 98.8 °F (37.1 °C) (04/25/24 1100)  Pulse: 83 (04/25/24 1400)  Resp: 17 (04/25/24 1400)  BP: 123/75 (04/25/24 0000)  SpO2: 100 % (04/25/24 1400) Vital Signs (24h Range):  Temp:  [97.8 °F (36.6 °C)-98.9 °F (37.2 °C)] 98.8 °F (37.1 °C)  Pulse:  [75-88] 83  Resp:  [8-31] 17  SpO2:  [92 %-100 %] 100 %  BP: (123)/(75) 123/75  Arterial Line BP: ()/() 174/82     Weight: 88 kg (194 lb 0.1 oz)  Body mass index is 28.65 kg/m².     SpO2: 100 %         Intake/Output Summary (Last 24 hours) at 4/25/2024 1500  Last data filed at 4/25/2024 1200  Gross per 24 hour   Intake 712.63 ml   Output 2810 ml   Net -2097.37 ml       Lines/Drains/Airways       Drain  Duration                  Urethral Catheter 04/23/24 2150 Temperature probe;Silicone 16 Fr. 1 day              Arterial Line  Duration             Arterial Line 04/23/24 2227 Right Radial 1 day              Peripheral Intravenous Line  Duration                  Peripheral IV - Single Lumen 04/23/24 2010 18 G Right Antecubital 1 day         Peripheral IV - Single Lumen 04/23/24 2015 18 G Left Antecubital 1 day         Peripheral IV - Single Lumen 04/23/24 2037 18 G Anterior;Left Forearm 1 day         Peripheral IV - Single Lumen 04/23/24 2037 20 G Posterior;Right Hand 1 day         Peripheral IV - Single Lumen 04/24/24 20 G Anterior;Right Forearm 1 day                        Physical Exam  Vitals and nursing note reviewed.   Constitutional:       General: He is not in acute distress.     Appearance: Normal appearance. He is well-developed. He is not diaphoretic.   HENT:      Head: Normocephalic and atraumatic.   Eyes:      General:         Right eye: No discharge.         Left eye: No discharge.      Pupils: Pupils are equal, round, and reactive to light.   Cardiovascular:      Rate and Rhythm: Normal rate and regular rhythm.      Heart sounds: Normal heart sounds, S1 normal and S2 normal. No murmur heard.  Pulmonary:      Effort: Pulmonary effort is normal. No respiratory distress.      Breath sounds: Normal breath sounds.   Abdominal:      General: There is no distension.   Musculoskeletal:      Right lower leg: No edema.      Left lower leg: No edema.   Skin:     General: Skin is warm and dry.      Findings: No erythema.      Comments: L radial access site C/D/I; intact pulse, no hematoma   Neurological:      General: No focal deficit present.      Mental Status: He is alert and oriented to person, place, and time.   Psychiatric:         Mood and Affect: Mood normal.         Behavior: Behavior normal.            Significant Labs: CMP   Recent Labs   Lab 04/23/24 2019 04/24/24 0336 04/25/24  0359    135* 138   K 3.2* 4.4 3.5    108 105   CO2 20* 20* 26   * 132* 111*   BUN 16 18 20   CREATININE 1.7* 1.9* 1.8*   CALCIUM 9.8 9.0 9.2   PROT 7.1 5.9* 5.8*   ALBUMIN 4.1 3.5 3.2*   BILITOT 0.6 1.0 1.5*   ALKPHOS 102 70 63   * 74* 39   * 102* 67*   ANIONGAP 12 7* 7*   , CBC   Recent Labs   Lab 04/23/24 2238 04/24/24 0336 04/25/24  0359   WBC 15.27* 10.72 8.63   HGB 16.1 14.5 13.7*   HCT 45.4 41.0 38.5*    210 171   , Troponin   Recent Labs   Lab 04/24/24 0336 04/24/24  1016 04/24/24  1617   TROPONINI 11.571* 3.761* 2.488*   , and All pertinent lab results from the last 24 hours have been reviewed.    Significant Imaging: Echocardiogram:  Transthoracic echo (TTE) complete (Cupid Only):   Results for orders placed or performed during the hospital encounter of 04/23/24   Echo   Result Value Ref Range    BSA 2.07 m2    LVOT stroke volume 73.13 cm3    LVIDd 4.34 3.5 - 6.0 cm    LV Systolic Volume 34.66 mL    LV Systolic Volume Index 17.0 mL/m2    LVIDs 2.99 2.1 - 4.0 cm    LV Diastolic Volume 84.84 mL    LV Diastolic Volume Index 41.59 mL/m2    IVS 1.19 (A) 0.6 - 1.1 cm    LVOT diameter 1.95 cm    LVOT area 3.0 cm2    FS 31 28 - 44 %    Left Ventricle Relative Wall Thickness 0.52 cm    Posterior Wall 1.13 (A) 0.6 - 1.1 cm    LV mass 178.37 g    LV Mass Index 87 g/m2    MV Peak E [place] 1.35 m/s    TDI LATERAL 0.13 m/s    TDI SEPTAL 0.11 m/s    E/E' ratio 11.25 m/s    MV Peak A [place] 0.91 m/s    E/A ratio 1.48     IVRT 49.48 msec    E wave deceleration time 151.19 msec    LV SEPTAL E/E' RATIO 12.27 m/s    LV LATERAL E/E' RATIO 10.38 m/s    LVOT peak [place] 1.30 m/s    Left Ventricular Outflow Tract Mean Velocity 1.07 cm/s    Left Ventricular Outflow Tract Mean Gradient 4.87 mmHg    RVOT peak VTI 15.7 cm    TAPSE 2.05 cm    LA size 3.67 cm    Left Atrium Minor Axis 5.21 cm    Left Atrium Major Axis 5.13 cm    RA Major Axis 4.40 cm    AV mean gradient 7 mmHg    AV peak gradient 12 mmHg    Ao peak [place] 1.72 m/s    Ao VTI 29.40 cm    LVOT peak VTI 24.50 cm    AV valve area 2.49 cm²    AV Velocity Ratio 0.76     AV index (prosthetic) 0.83     RONNI by Velocity Ratio 2.26 cm²    Mr max [place] 3.09 m/s    MV stenosis pressure 1/2 time 43.85 ms    MV valve area p 1/2 method 5.02 cm2    PV mean gradient 3 mmHg    RVOT peak [place] 1.02 m/s    Ao root annulus 3.12 cm    STJ 3.32 cm    Ascending aorta 3.18 cm    IVC diameter 1.95 cm    Mean e' 0.12 m/s    ZLVIDS -1.74     ZLVIDD -3.37     LA Volume Index 26.9 mL/m2    LA volume 54.83 cm3    LA WIDTH 3.4 cm    RA Width 2.8 cm    Narrative      Left Ventricle: The left ventricle is normal in size. Normal wall   thickness. There is  concentric remodeling. There is normal systolic   function with a visually estimated ejection fraction of 60 - 65%. Grade II   diastolic dysfunction. Elevated left ventricular filling pressure.    Right Ventricle: Right ventricle was not well visualized due to poor   acoustic window. Right ventricle wall motion  is unable to be assessed.   Systolic function could not be assesed.    Aortic Valve: Aortic valve peak velocity is 1.72 m/s. Mean gradient is   7 mmHg.    Mitral Valve: There is mild regurgitation.    IVC/SVC: Patient is ventilated, cannot use IVC diameter to estimate   right atrial pressure.      and EKG: Reviewed  Assessment and Plan:   Patient who presents s/p cardiac arrest. LHC showed non-obs CAD. EP on board, awaiting AICD.    * Cardiac arrest  -Reportedly VT/VF upon arrival, ROSC obtained after CPR and defib x 1  -Stable since admission  -Troponin up-trending   -TTE pending  -[facility] today  -ASA, heparin gtt  -Will optimize meds post procedure     4/25/24  -C showed non-obs CAD  -Continue ASA  -Statin as tolerated      NSTEMI (non-ST elevated myocardial infarction)  -Troponin 0.112>6.820>11.571, continue to trend  -Continue ASA, heparin gtt  -BP soft, unable to further maximize med regimen, will do so as tolerated  -TTE pending  -C planned pending improvement/stability     4/25/24  -C showed non-obs CAD    Hypertension  -Optimize meds post procedure    Lactic acidosis  -Secondary to cardiac arrest    Hypokalemia  -Repletion as per primary team    Shock liver  -Secondary to cardiac arrest  -Monitor    SLIME (acute kidney injury)  -Secondary to cardiac arrest/shock    Ventricular fibrillation  -Reportedly had VT/VF upon EMS arrival  -K initially low at 3.2, has been repleted  -Known history of PVC's, EF previously normal  -Repeat TTE pending  -Continue supportive care  -Ischemic workup/LHC today  -EP also consulted for AICD consideration    4/25/24  -[facility] with non-obs CAD  -Keep K > 4; Mg > 2  -EP on  board, AICD planned        VTE Risk Mitigation (From admission, onward)           Ordered     enoxaparin injection 40 mg  Every 24 hours         04/24/24 1520     IP VTE HIGH RISK PATIENT  Once         04/23/24 2259     Place sequential compression device  Until discontinued         04/23/24 1317                    Amisha Park PA-C  Cardiology  '[facility] - Intensive Care ([facility])

## 2024-04-25 NOTE — ASSESSMENT & PLAN NOTE
Patient is likely to have ischemic hepatitis as evidenced by abnormalities in AST and ALT. Etiology includes  cardiac arrest . Continue to monitor liver function while treating underlying condition. Daily labs to include CMP.    AST   Date Value Ref Range Status   04/25/2024 39 10 - 40 U/L Final     ALT   Date Value Ref Range Status   04/25/2024 67 (H) 10 - 44 U/L Final

## 2024-04-25 NOTE — ASSESSMENT & PLAN NOTE
Hx palpitations, noted to have 8% PVC burden on Holter monitor in 01/2024. Followed by GARRET Philip   Cardiology and EP following - plan for AICD placement

## 2024-04-25 NOTE — HOSPITAL COURSE
4/25/24-Patient seen and examined today, resting in bed. Feels ok. LHC showed non-obs CAD. Labs reviewed/stable. Occasional PVC's noted on telemetry. EP on board, AICD planned.     4/26/24-Patient seen and examined today, resting in bed. Feels well, back to his baseline. No CP/SOB. BB resumed yesterday, HR stable. LifeVest arranged, AICD planned for 4/30/24, EP on board. Inflammatory markers elevated. Cardiac MRI/PET scheduled.

## 2024-04-25 NOTE — ASSESSMENT & PLAN NOTE
Patient is likely to have ischemic hepatitis as evidenced by abnormalities in AST and ALT. Etiology includes cardiogenic shock. Continue to monitor liver function while treating underlying condition. Daily labs to include CMP. Avoid hepatotoxins.    AST   Date Value Ref Range Status   04/25/2024 39 10 - 40 U/L Final     ALT   Date Value Ref Range Status   04/25/2024 67 (H) 10 - 44 U/L Final     4/24 monitor labs   4/25 trending down

## 2024-04-25 NOTE — PROGRESS NOTES
[facility]  Critical Care Medicine  Progress Note    Patient Name: Milind Melgar  MRN: [id number]  Admission Date: 4/23/2024  Hospital Length of Stay: 2 days  Code Status: Full Code  Attending Provider: Elvia Lloyd MD  Primary Care Provider: ANNE Ford Jr., NP   Principal Problem: Cardiac arrest    Subjective:     HPI:  Milind Melgar is a 53-year-old male with a past medical history of hypertension, hyperlipidemia, diverticulitis, palpitations due to PVC's, low testosterone-on therapy, who presented to the ED via EMS after suffering cardiac arrest at home. Per family, patient was found collapsed, unresponsive, pulseless and apneic. Last known well was 5 minutes prior to discovery. CPR was initiated by family and continued by [facility] for 3 minutes. His initial rhythm was pulseless ventricular tachycardia. ROSC was achieved after 1 defibrillation. EMS administered Ketamine in attempt to secure definitive airway placement without success and he arrived to ED with BVM ventilations in progress.     Family reports that yesterday patient complained of feeling palpitations impending before lunch, then at noon he  reported feeling them. He denied chest pain/discomfort, shortness of breath, syncope, dizziness at that time. Chart reveals cardiology visit in January this year for palpitations with 10 day Holter monitor showing no arrhythmias other than PVC's with 8% burden. Patient was changed from losartan to acebutolol in January, but took himself off of his medications approx 2 weeks later. He has also donated blood 2x since January, last being 2-3 weeks ago. Unable to obtain remainder of ROS due to patient intubated.     ED course revealed EKG with R axis deviation, QTc 437. CBC shows WBC 15.27, likely reactive. Chemistry w/K 3.2, CO2 20, BUN/Cr 16/1.7, glucose 195, , . Troponin 0.112, BNP <10. Procalcitonin negative. UDS negative. Lactic acid 3.8. CXR w/no infiltrates,  opacities, vascular congestion, or effusions. Patient is intubated, sedated, and on mechanical ventilation. CT head negative.     Cardiology consulted and recommended heparin protocol and if any recurrence of NSVT to start amiodarone.     Critical care was consulted for admission and management.         Hospital/ICU Course:  4/24 placed on PS this am ,wide awake and now extubated   Cath lab     4/25 - no acute events  No complaints     Interval History: no acute events  No complaints       Objective:     Vital Signs (Most Recent):  Temp: 98.5 °F (36.9 °C) (04/25/24 0700)  Pulse: 78 (04/25/24 1000)  Resp: 14 (04/25/24 1000)  BP: 123/75 (04/25/24 0000)  SpO2: 100 % (04/25/24 1000) Vital Signs (24h Range):  Temp:  [97.8 °F (36.6 °C)-99.5 °F (37.5 °C)] 98.5 °F (36.9 °C)  Pulse:  [72-88] 78  Resp:  [8-31] 14  SpO2:  [92 %-100 %] 100 %  BP: (123-161)/(67-99) 123/75  Arterial Line BP: ()/() 165/82     Weight: 88 kg (194 lb 0.1 oz)  Body mass index is 28.65 kg/m².      Intake/Output Summary (Last 24 hours) at 4/25/2024 1027  Last data filed at 4/25/2024 1000  Gross per 24 hour   Intake 1235.16 ml   Output 3860 ml   Net -2624.84 ml        Physical Exam  Vitals and nursing note reviewed.   Constitutional:       General: He is not in acute distress.  HENT:      Head: Normocephalic and atraumatic.   Eyes:      General:         Right eye: No discharge.         Left eye: No discharge.   Cardiovascular:      Rate and Rhythm: Normal rate and regular rhythm.      Heart sounds: No murmur heard.  Pulmonary:      Effort: No respiratory distress.      Breath sounds: No wheezing or rales.   Abdominal:      General: There is no distension.      Palpations: Abdomen is soft.   Musculoskeletal:         General: No swelling or tenderness.      Cervical back: Neck supple.   Neurological:      Mental Status: He is alert.      Comments: Alert / oriented   Follow commands            Review of Systems   Constitutional: Negative.     HENT: Negative.     Eyes: Negative.    Respiratory: Negative.     Cardiovascular: Negative.    Gastrointestinal: Negative.        Vents:  Vent Mode: (S) Spont (04/24/24 0729)  Set Rate: 18 BPM (04/24/24 0518)  Vt Set: 450 mL (04/24/24 0518)  Pressure Support: (S) 7 cmH20 (04/24/24 0729)  PEEP/CPAP: 5 cmH20 (04/24/24 0729)  Oxygen Concentration (%): (S) 32 (04/24/24 0824)  Peak Airway Pressure: 12 cmH20 (04/24/24 0729)  Plateau Pressure: 18 cmH20 (04/24/24 0729)  Total Ve: 8.24 L/m (04/24/24 0729)  Negative Inspiratory Force (cm H2O): 0 (04/24/24 0729)  F/VT Ratio<105 (RSBI): (!) 29.82 (04/24/24 0729)    Lines/Drains/Airways       Drain  Duration                  Urethral Catheter 04/23/24 2150 Temperature probe;Silicone 16 Fr. 1 day              Arterial Line  Duration             Arterial Line 04/23/24 2227 Right Radial 1 day              Peripheral Intravenous Line  Duration                  Peripheral IV - Single Lumen 04/23/24 2010 18 G Right Antecubital 1 day         Peripheral IV - Single Lumen 04/23/24 2015 18 G Left Antecubital 1 day         Peripheral IV - Single Lumen 04/23/24 2037 18 G Anterior;Left Forearm 1 day         Peripheral IV - Single Lumen 04/23/24 2037 20 G Posterior;Right Hand 1 day         Peripheral IV - Single Lumen 04/24/24 20 G Anterior;Right Forearm 1 day                    Significant Labs:    CBC/Anemia Profile:  Recent Labs   Lab 04/23/24 2238 04/24/24 0336 04/25/24  0359   WBC 15.27* 10.72 8.63   HGB 16.1 14.5 13.7*   HCT 45.4 41.0 38.5*    210 171   MCV 90 90 90   RDW 12.8 12.7 12.9        Chemistries:  Recent Labs   Lab 04/23/24 2019 04/23/24 2238 04/24/24 0336 04/25/24  0359     --  135* 138   K 3.2*  --  4.4 3.5     --  108 105   CO2 20*  --  20* 26   BUN 16  --  18 20   CREATININE 1.7*  --  1.9* 1.8*   CALCIUM 9.8  --  9.0 9.2   ALBUMIN 4.1  --  3.5 3.2*   PROT 7.1  --  5.9* 5.8*   BILITOT 0.6  --  1.0 1.5*   ALKPHOS 102  --  70 63   *  --  102*  67*   *  --  74* 39   MG  --  2.2 1.8 2.2   PHOS  --   --  3.6 2.9       All pertinent labs within the past 24 hours have been reviewed.    Significant Imaging:  I have reviewed all pertinent imaging results/findings within the past 24 hours.    ABG  Recent Labs   Lab 04/24/24  0339   PH 7.326*   PO2 163*   PCO2 45.2*   HCO3 23.6*   BE -2     Assessment/Plan:     Pulmonary  On mechanically assisted ventilation  - vent bundle in place  - VAP prophylaxis  - wean settings as tolerated  - prn ABG  - SAT/SBT daily    4/24 off vent now   Extubated this am     4/25   Extubated  Antibiotics discontinued  F/u CXR    Cardiac/Vascular  * Cardiac arrest  - etiology unclear  - suspect arrhythmia event  - echo ordered  - sedation vacation revealed patient following commands, therefore no TTM initiated. \    4/24- woke up this am   Heart cath done, nonobstructive disease \  EP consulted   Plan per cards    4/25 no further episodes  EP consulted   Await recs       NSTEMI (non-ST elevated myocardial infarction)  - troponin 0.112->6.820  - likely 2/2 demand/ischemia following cardiac arrest  - heparin gtt  - lipid panel, A1c ordered      History of palpitations  - cardiology consulted  - further work up when stable  - ?ICD    Hyperlipidemia  - checking lipids  - resume statin when as LFT's allow  - monitor LFT's      Hypertension  - holding medications acutely  - patient was on acebutolol, took himself off 6-8 wks ago  - add meds as needed    4/24 prn meds as needed  May need oral meds     Ventricular fibrillation  - ?R on T or V-tach prior to arrest  - cardiology consulted and appreciate recs  - amio if VT   - monitor and replace electrolytes prn    4/24 see cardiac arrest       Renal/  Lactic acidosis  - resolved  - suspect 2/2 arrest event    Hypokalemia  Patient has hypokalemia which is Acute and currently uncontrolled. Most recent potassium levels reviewed-   Lab Results   Component Value Date    K 3.5 04/25/2024     -  Will continue potassium replacement per protocol and recheck repeat levels after replacement completed  - check mag    4/25 replace as needed     SLIME (acute kidney injury)  Patient with acute kidney injury/acute renal failure likely due to low flow state/cardiac arrest. Baseline creatinine unknown - Labs reviewed- Renal function/electrolytes with Estimated Creatinine Clearance: 52.7 mL/min (A) (based on SCr of 1.8 mg/dL (H)). according to latest data. Monitor urine output and serial BMP and adjust therapy as needed. Avoid nephrotoxins and renally dose meds for GFR listed above.    4/24 monitor   4/25 monitor     GI  Shock liver  Patient is likely to have ischemic hepatitis as evidenced by abnormalities in AST and ALT. Etiology includes cardiogenic shock. Continue to monitor liver function while treating underlying condition. Daily labs to include CMP. Avoid hepatotoxins.    AST   Date Value Ref Range Status   04/25/2024 39 10 - 40 U/L Final     ALT   Date Value Ref Range Status   04/25/2024 67 (H) 10 - 44 U/L Final     4/24 monitor labs   4/25 trending down           F/u cards recs  Probable transfer to tele  Ask med to see .     Elvia Lloyd MD  Critical Care Medicine  '[facility] ([facility])

## 2024-04-25 NOTE — ASSESSMENT & PLAN NOTE
Patient with acute kidney injury/acute renal failure likely due to  prerenal in setting of cardiac arrest  SLIME is currently stable. Baseline creatinine unknown - Labs reviewed- Renal function/electrolytes with Estimated Creatinine Clearance: 52.7 mL/min (A) (based on SCr of 1.8 mg/dL (H)). according to latest data. Monitor urine output and serial BMP and adjust therapy as needed. Avoid nephrotoxins and renally dose meds for GFR listed above.  Strict Is and Os

## 2024-04-25 NOTE — ASSESSMENT & PLAN NOTE
Reported VT arrest, ROSC post defibrillation x 1  Cardiology following   [facility] with nonobs CAD   Continue B blocker   Tentatively planned for AICD placement 04/30 but cardiology recommend Lifevest in the interim   SW consulted for Lifevest   Monitor tele   Monitor electrolytes, Mg > 2, K > 4

## 2024-04-25 NOTE — ASSESSMENT & PLAN NOTE
Patient has hypokalemia which is Acute and currently controlled. Most recent potassium levels reviewed-   Lab Results   Component Value Date    K 3.5 04/25/2024   . Will continue potassium replacement per protocol and recheck repeat levels after replacement completed.

## 2024-04-25 NOTE — SUBJECTIVE & OBJECTIVE
Interval History: no acute events  No complaints       Objective:     Vital Signs (Most Recent):  Temp: 98.5 °F (36.9 °C) (04/25/24 0700)  Pulse: 78 (04/25/24 1000)  Resp: 14 (04/25/24 1000)  BP: 123/75 (04/25/24 0000)  SpO2: 100 % (04/25/24 1000) Vital Signs (24h Range):  Temp:  [97.8 °F (36.6 °C)-99.5 °F (37.5 °C)] 98.5 °F (36.9 °C)  Pulse:  [72-88] 78  Resp:  [8-31] 14  SpO2:  [92 %-100 %] 100 %  BP: (123-161)/(67-99) 123/75  Arterial Line BP: ()/() 165/82     Weight: 88 kg (194 lb 0.1 oz)  Body mass index is 28.65 kg/m².      Intake/Output Summary (Last 24 hours) at 4/25/2024 1027  Last data filed at 4/25/2024 1000  Gross per 24 hour   Intake 1235.16 ml   Output 3860 ml   Net -2624.84 ml        Physical Exam  Vitals and nursing note reviewed.   Constitutional:       General: He is not in acute distress.  HENT:      Head: Normocephalic and atraumatic.   Eyes:      General:         Right eye: No discharge.         Left eye: No discharge.   Cardiovascular:      Rate and Rhythm: Normal rate and regular rhythm.      Heart sounds: No murmur heard.  Pulmonary:      Effort: No respiratory distress.      Breath sounds: No wheezing or rales.   Abdominal:      General: There is no distension.      Palpations: Abdomen is soft.   Musculoskeletal:         General: No swelling or tenderness.      Cervical back: Neck supple.   Neurological:      Mental Status: He is alert.      Comments: Alert / oriented   Follow commands            Review of Systems   Constitutional: Negative.    HENT: Negative.     Eyes: Negative.    Respiratory: Negative.     Cardiovascular: Negative.    Gastrointestinal: Negative.        Vents:  Vent Mode: (S) Spont (04/24/24 0729)  Set Rate: 18 BPM (04/24/24 0518)  Vt Set: 450 mL (04/24/24 0518)  Pressure Support: (S) 7 cmH20 (04/24/24 0729)  PEEP/CPAP: 5 cmH20 (04/24/24 0729)  Oxygen Concentration (%): (S) 32 (04/24/24 0824)  Peak Airway Pressure: 12 cmH20 (04/24/24 0729)  Plateau Pressure: 18  cmH20 (04/24/24 0729)  Total Ve: 8.24 L/m (04/24/24 0729)  Negative Inspiratory Force (cm H2O): 0 (04/24/24 0729)  F/VT Ratio<105 (RSBI): (!) 29.82 (04/24/24 0729)    Lines/Drains/Airways       Drain  Duration                  Urethral Catheter 04/23/24 2150 Temperature probe;Silicone 16 Fr. 1 day              Arterial Line  Duration             Arterial Line 04/23/24 2227 Right Radial 1 day              Peripheral Intravenous Line  Duration                  Peripheral IV - Single Lumen 04/23/24 2010 18 G Right Antecubital 1 day         Peripheral IV - Single Lumen 04/23/24 2015 18 G Left Antecubital 1 day         Peripheral IV - Single Lumen 04/23/24 2037 18 G Anterior;Left Forearm 1 day         Peripheral IV - Single Lumen 04/23/24 2037 20 G Posterior;Right Hand 1 day         Peripheral IV - Single Lumen 04/24/24 20 G Anterior;Right Forearm 1 day                    Significant Labs:    CBC/Anemia Profile:  Recent Labs   Lab 04/23/24 2238 04/24/24 0336 04/25/24  0359   WBC 15.27* 10.72 8.63   HGB 16.1 14.5 13.7*   HCT 45.4 41.0 38.5*    210 171   MCV 90 90 90   RDW 12.8 12.7 12.9        Chemistries:  Recent Labs   Lab 04/23/24 2019 04/23/24 2238 04/24/24 0336 04/25/24  0359     --  135* 138   K 3.2*  --  4.4 3.5     --  108 105   CO2 20*  --  20* 26   BUN 16  --  18 20   CREATININE 1.7*  --  1.9* 1.8*   CALCIUM 9.8  --  9.0 9.2   ALBUMIN 4.1  --  3.5 3.2*   PROT 7.1  --  5.9* 5.8*   BILITOT 0.6  --  1.0 1.5*   ALKPHOS 102  --  70 63   *  --  102* 67*   *  --  74* 39   MG  --  2.2 1.8 2.2   PHOS  --   --  3.6 2.9       All pertinent labs within the past 24 hours have been reviewed.    Significant Imaging:  I have reviewed all pertinent imaging results/findings within the past 24 hours.

## 2024-04-25 NOTE — CONSULTS
"[facility] ([facility])  [facility] Medicine  Consult Note    Patient Name: Milind Melgar  MRN: [id number]  Admission Date: 4/23/2024  Hospital Length of Stay: 2 days  Attending Physician: Elvia Lloyd MD   Primary Care Provider: ANNE Ford Jr., NP           Patient information was obtained from patient, spouse/SO, relative(s), past medical records, ER records, and primary team.     Inpatient consult to Hospitalist  Consult performed by: Amy Escoto MD  Consult ordered by: Elvia Lloyd MD        Subjective:     Principal Problem: Cardiac arrest    Chief Complaint:   Chief Complaint   Patient presents with    Cardiac Arrest     Pt to ER via [facility] for evaluation s/p cardiac arrest; initial called received at 1910; when [facility] arrived pt was pulseless with CPR in progress; intitial EKG read V Tach; pt received (1) shock and had ROSC; pt also given 3ml of Ketamine to attempt airway placed; definitive airway placement prior to arrival not successful; palpable pulse noted upon arrival; pt being bagged; family reports pt recently c/o "palpitations"        HPI: Pt is a 53-year-old male with a past medical history of palpitations/PVCs, HTN, hyperlipidemia, diverticulitis who initially presented to the ED on 04/23 via EMS after suffering cardiac arrest at home. Family found patient unresponsive, pulseless and apneic and started CPR. LKW 5 minutes prior. On EMS arrival, initial rhythm was pulseless ventricular tachycardia. ROSC was achieved after 1 defibrillation.  Family reported that patient complained of feeling palpitations on afternoon of presentation. He denied chest pain/discomfort, shortness of breath prior to episode. . Per chart review, pt had cardiology visit in January this year for eval of palpitations, 10 day Holter monitor at the time showing no arrhythmias other than PVC's with 8% burden. Patient was started on acebutolol in January, but took himself off of his medications approx 2 " weeks later.  He reports he had been doing well since then, had improvement in symptoms after blood donation, no recurrence of palpitations until the day of arrest. Denies recent illness, changes to medications, or use of caffeine/workout supplements. Does note that he had done an intense workout on morning of arrest.      ED course revealed EKG with R axis deviation, QTc 437. CBC shows WBC 15.2. . Chemistry w/K 3.2, CO2 20, BUN/Cr 16/1.7, glucose 195, , . Troponin 0.112, BNP <10. Procalcitonin negative. UDS negative. Lactic acid 3.8. CXR w/no infiltrates, opacities, vascular congestion, or effusions.  CT head negative. Pt intubated, sedated in the ED.  Cardiology consulted and recommended heparin protocol and if any recurrence of NSVT to start amiodarone. He was admitted to ICU for further management. He was extubated on 04/24/2024. Underwent LHC on 04/24 which showed nonobstructive CAD. EP was consulted and pt is tentatively planned for AICD placement 04/30/2024. In the interim, cardiology recommend lifevest and return as outpatient to have AICD placed. Pt was deemed stable for transfer to the floor on 04/25/2024. Hospital Medicine consulted. Pt seen upon transfer to the floor, multiple family members at bedside. Pt awake, alert. He denies CP, SOB, palpitations, dizziness at this time.     Past Medical History:   Diagnosis Date    Diverticulitis     Hyperlipidemia     Hypertension     On mechanically assisted ventilation 04/24/2024    PVC's (premature ventricular contractions)        Past Surgical History:   Procedure Laterality Date    CORONARY ANGIOGRAPHY N/A 4/24/2024    Procedure: ANGIOGRAM, CORONARY ARTERY;  Surgeon: Fitz Ochoa MD;  Location: [facility] CATH LAB;  Service: Cardiology;  Laterality: N/A;    LEFT HEART CATHETERIZATION Left 4/24/2024    Procedure: Left heart cath;  Surgeon: Fitz Ochoa MD;  Location: [facility] CATH LAB;  Service: Cardiology;  Laterality: Left;    VENTRICULOGRAM,  LEFT  4/24/2024    Procedure: Ventriculogram, Left;  Surgeon: Fitz Ochoa MD;  Location: [facility] CATH LAB;  Service: Cardiology;;       Review of patient's allergies indicates:  No Known Allergies    Current Facility-Administered Medications   Medication Dose Route Frequency Provider Last Rate Last Admin    acetaminophen tablet 650 mg  650 mg Oral Q4H PRN Fitz Ochoa MD        ALPRAZolam tablet 0.5 mg  0.5 mg Oral Nightly PRN Liana Crum NP   0.5 mg at 04/25/24 1514    aspirin chewable tablet 81 mg  81 mg Oral Daily Elvia Lloyd MD   81 mg at 04/25/24 0817    enoxaparin injection 40 mg  40 mg Subcutaneous Q24H (prophylaxis, 1700) Elvia Lloyd MD   40 mg at 04/25/24 1628    influenza (QUADRIVALENT PF) vaccine 0.5 mL  0.5 mL Intramuscular vaccine x 1 dose Elvia Lloyd MD        labetaloL injection 10 mg  10 mg Intravenous Q6H PRN Elvia Lloyd MD   10 mg at 04/24/24 0837    magnesium sulfate 2g in water 50mL IVPB (premix)  2 g Intravenous PRN Liana Crum NP   Stopped at 04/24/24 0610    magnesium sulfate 2g in water 50mL IVPB (premix)  4 g Intravenous PRN Liana Crum NP        metoprolol succinate (TOPROL-XL) 24 hr tablet 25 mg  25 mg Oral Daily Amisha Park PA-C   25 mg at 04/25/24 1628    mupirocin 2 % ointment   Nasal BID Elvia Lloyd MD   Given at 04/25/24 0808    ondansetron disintegrating tablet 8 mg  8 mg Oral Q8H PRN Fitz Ochoa MD        pneumoc 20-jeovanny conj-dip cr(PF) (PREVNAR-20 (PF)) injection Syrg 0.5 mL  0.5 mL Intramuscular vaccine x 1 dose Elvia Lloyd MD        potassium chloride 10 mEq in 100 mL IVPB  40 mEq Intravenous PRN Liana Crum NP        And    potassium chloride 10 mEq in 100 mL IVPB  60 mEq Intravenous PRN Liana Crum NP        And    potassium chloride 10 mEq in 100 mL IVPB  80 mEq Intravenous PRN Liana Crum NP        sodium chloride 0.9% flush 10 mL  10 mL Intravenous PRN  Liana Crum, JENNIE         Family History    None       Tobacco Use    Smoking status: Never    Smokeless tobacco: Never   Substance and Sexual Activity    Alcohol use: Not on file    Drug use: Not on file    Sexual activity: Not on file     Review of Systems   Constitutional:  Negative for activity change, appetite change, chills, fatigue and fever.   HENT: Negative.     Respiratory:  Negative for cough, chest tightness and shortness of breath.    Cardiovascular:  Positive for palpitations. Negative for chest pain and leg swelling.   Gastrointestinal:  Positive for constipation (no BM since admission). Negative for abdominal pain, diarrhea, nausea and vomiting.   Genitourinary:  Negative for difficulty urinating.   Musculoskeletal: Negative.    Skin: Negative.    Allergic/Immunologic: Negative.    Neurological:  Negative for dizziness, syncope and light-headedness.   Hematological: Negative.    Psychiatric/Behavioral: Negative.       Objective:     Vital Signs (Most Recent):  Temp: 97.7 °F (36.5 °C) (04/25/24 1607)  Pulse: 87 (04/25/24 1607)  Resp: 18 (04/25/24 1607)  BP: 139/86 (04/25/24 1607)  SpO2: 99 % (04/25/24 1607) Vital Signs (24h Range):  Temp:  [97.7 °F (36.5 °C)-98.9 °F (37.2 °C)] 97.7 °F (36.5 °C)  Pulse:  [77-88] 87  Resp:  [8-31] 18  SpO2:  [92 %-100 %] 99 %  BP: (123-139)/(75-86) 139/86  Arterial Line BP: ()/(59-84) 174/82     Weight: 88 kg (194 lb 0.1 oz)  Body mass index is 28.65 kg/m².     Physical Exam  Vitals and nursing note reviewed.   Constitutional:       General: He is not in acute distress.     Appearance: Normal appearance. He is not ill-appearing.   HENT:      Head: Normocephalic and atraumatic.      Mouth/Throat:      Pharynx: Oropharynx is clear.   Eyes:      General: No scleral icterus.     Extraocular Movements: Extraocular movements intact.      Conjunctiva/sclera: Conjunctivae normal.   Cardiovascular:      Rate and Rhythm: Normal rate and regular rhythm.      Heart  sounds: No murmur heard.     No friction rub. No gallop.   Pulmonary:      Effort: Pulmonary effort is normal.      Breath sounds: Normal breath sounds. No wheezing, rhonchi or rales.      Comments: On room air   Abdominal:      General: Bowel sounds are normal. There is no distension.      Palpations: Abdomen is soft.      Tenderness: There is no abdominal tenderness. There is no guarding or rebound.   Genitourinary:     Comments: deferred  Musculoskeletal:      Right lower leg: No edema.      Left lower leg: No edema.   Neurological:      General: No focal deficit present.      Mental Status: He is alert and oriented to person, place, and time. Mental status is at baseline.   Psychiatric:         Mood and Affect: Mood normal.         Behavior: Behavior normal.          Significant Labs: All pertinent labs within the past 24 hours have been reviewed.    Significant Imaging: I have reviewed all pertinent imaging results/findings within the past 24 hours.  Assessment/Plan:     * Cardiac arrest  Reported VT arrest, ROSC post defibrillation x 1  Cardiology following   [facility] with nonobs CAD   Continue B blocker   Tentatively planned for AICD placement 04/30 but cardiology recommend Lifevest in the interim   SW consulted for Lifevest   Monitor tele   Monitor electrolytes, Mg > 2, K > 4      History of palpitations  Hx palpitations, noted to have 8% PVC burden on Holter monitor in 01/2024. Followed by GARRET Philip   Cardiology and EP following - plan for AICD placement       Ventricular fibrillation  See Cardiac Arrest for Plan       NSTEMI (non-ST elevated myocardial infarction)  - Cardiology consulted  - s/p heparin drip  - [facility] 04/24 with nonobstructive CAD   - continue ASA, not on statin due to elevated LFTs   - Trop peaked 11.571 and downtrending       Hyperlipidemia  - statin held due to elevated LFTs/shock liver  - monitor LFTs       Hypertension  Chronic, controlled. Latest blood pressure and vitals reviewed-      Temp:  [97.7 °F (36.5 °C)-98.9 °F (37.2 °C)]   Pulse:  [77-88]   Resp:  [8-31]   BP: (123-139)/(75-86)   SpO2:  [92 %-100 %]   Arterial Line BP: ()/(59-84) .   Home meds for hypertension were reviewed and noted below.       While in the hospital, will manage blood pressure as follows; Adjust home antihypertensive regimen as follows- continue metoprolol     Will utilize p.r.n. blood pressure medication only if patient's blood pressure greater than 180/110 and he develops symptoms such as worsening chest pain or shortness of breath.    Lactic acidosis  - resolved, likely in setting of cardiac arrest     Hypokalemia  Patient has hypokalemia which is Acute and currently controlled. Most recent potassium levels reviewed-   Lab Results   Component Value Date    K 3.5 04/25/2024   . Will continue potassium replacement per protocol and recheck repeat levels after replacement completed.     Shock liver  Patient is likely to have ischemic hepatitis as evidenced by abnormalities in AST and ALT. Etiology includes  cardiac arrest . Continue to monitor liver function while treating underlying condition. Daily labs to include CMP.    AST   Date Value Ref Range Status   04/25/2024 39 10 - 40 U/L Final     ALT   Date Value Ref Range Status   04/25/2024 67 (H) 10 - 44 U/L Final         SLIME (acute kidney injury)  Patient with acute kidney injury/acute renal failure likely due to  prerenal in setting of cardiac arrest  SLIME is currently stable. Baseline creatinine unknown - Labs reviewed- Renal function/electrolytes with Estimated Creatinine Clearance: 52.7 mL/min (A) (based on SCr of 1.8 mg/dL (H)). according to latest data. Monitor urine output and serial BMP and adjust therapy as needed. Avoid nephrotoxins and renally dose meds for GFR listed above.  Strict Is and Os       VTE Risk Mitigation (From admission, onward)           Ordered     enoxaparin injection 40 mg  Every 24 hours         04/24/24 1520     IP VTE HIGH RISK  PATIENT  Once         04/23/24 2259     Place sequential compression device  Until discontinued         04/23/24 2257                        Thank you for your consult. I will follow-up with patient. Please contact us if you have any additional questions.    Amy Escoto MD  Department of Hospital Medicine   '[facility] - Telemetry ([facility])

## 2024-04-26 ENCOUNTER — TELEPHONE (OUTPATIENT)
Dept: CARDIOLOGY | Facility: HOSPITAL | Age: 54
End: 2024-04-26
Payer: COMMERCIAL

## 2024-04-26 VITALS
DIASTOLIC BLOOD PRESSURE: 93 MMHG | WEIGHT: 194 LBS | SYSTOLIC BLOOD PRESSURE: 150 MMHG | OXYGEN SATURATION: 100 % | RESPIRATION RATE: 16 BRPM | HEART RATE: 82 BPM | HEIGHT: 69 IN | BODY MASS INDEX: 28.73 KG/M2 | TEMPERATURE: 98 F

## 2024-04-26 DIAGNOSIS — I46.9 CARDIAC ARREST: Primary | ICD-10-CM

## 2024-04-26 LAB
ALBUMIN SERPL BCP-MCNC: 4 G/DL (ref 3.5–5.2)
ALP SERPL-CCNC: 73 U/L (ref 55–135)
ALT SERPL W/O P-5'-P-CCNC: 55 U/L (ref 10–44)
ANION GAP SERPL CALC-SCNC: 12 MMOL/L (ref 8–16)
AST SERPL-CCNC: 25 U/L (ref 10–40)
BACTERIA SPEC AEROBE CULT: NORMAL
BASOPHILS # BLD AUTO: 0.03 K/UL (ref 0–0.2)
BASOPHILS NFR BLD: 0.4 % (ref 0–1.9)
BILIRUB SERPL-MCNC: 1.2 MG/DL (ref 0.1–1)
BUN SERPL-MCNC: 18 MG/DL (ref 6–20)
CALCIUM SERPL-MCNC: 11 MG/DL (ref 8.7–10.5)
CHLORIDE SERPL-SCNC: 104 MMOL/L (ref 95–110)
CO2 SERPL-SCNC: 23 MMOL/L (ref 23–29)
CREAT SERPL-MCNC: 1.7 MG/DL (ref 0.5–1.4)
DIFFERENTIAL METHOD BLD: ABNORMAL
EOSINOPHIL # BLD AUTO: 0 K/UL (ref 0–0.5)
EOSINOPHIL NFR BLD: 0.4 % (ref 0–8)
ERYTHROCYTE [DISTWIDTH] IN BLOOD BY AUTOMATED COUNT: 12.4 % (ref 11.5–14.5)
EST. GFR  (NO RACE VARIABLE): 48 ML/MIN/1.73 M^2
GLUCOSE SERPL-MCNC: 99 MG/DL (ref 70–110)
GRAM STN SPEC: NORMAL
HCT VFR BLD AUTO: 44.6 % (ref 40–54)
HGB BLD-MCNC: 15.6 G/DL (ref 14–18)
IMM GRANULOCYTES # BLD AUTO: 0.02 K/UL (ref 0–0.04)
IMM GRANULOCYTES NFR BLD AUTO: 0.2 % (ref 0–0.5)
LYMPHOCYTES # BLD AUTO: 0.9 K/UL (ref 1–4.8)
LYMPHOCYTES NFR BLD: 10.9 % (ref 18–48)
MAGNESIUM SERPL-MCNC: 2 MG/DL (ref 1.6–2.6)
MCH RBC QN AUTO: 32.1 PG (ref 27–31)
MCHC RBC AUTO-ENTMCNC: 35 G/DL (ref 32–36)
MCV RBC AUTO: 92 FL (ref 82–98)
MONOCYTES # BLD AUTO: 0.8 K/UL (ref 0.3–1)
MONOCYTES NFR BLD: 9.3 % (ref 4–15)
NEUTROPHILS # BLD AUTO: 6.3 K/UL (ref 1.8–7.7)
NEUTROPHILS NFR BLD: 78.8 % (ref 38–73)
NRBC BLD-RTO: 0 /100 WBC
PLATELET # BLD AUTO: 201 K/UL (ref 150–450)
PMV BLD AUTO: 9.8 FL (ref 9.2–12.9)
POTASSIUM SERPL-SCNC: 4.3 MMOL/L (ref 3.5–5.1)
PROT SERPL-MCNC: 7.6 G/DL (ref 6–8.4)
RBC # BLD AUTO: 4.86 M/UL (ref 4.6–6.2)
SODIUM SERPL-SCNC: 139 MMOL/L (ref 136–145)
WBC # BLD AUTO: 8.04 K/UL (ref 3.9–12.7)

## 2024-04-26 PROCEDURE — 85025 COMPLETE CBC W/AUTO DIFF WBC: CPT | Performed by: INTERNAL MEDICINE

## 2024-04-26 PROCEDURE — 25000003 PHARM REV CODE 250: Performed by: INTERNAL MEDICINE

## 2024-04-26 PROCEDURE — 99233 SBSQ HOSP IP/OBS HIGH 50: CPT | Mod: FS,,, | Performed by: NURSE PRACTITIONER

## 2024-04-26 PROCEDURE — 83735 ASSAY OF MAGNESIUM: CPT | Performed by: INTERNAL MEDICINE

## 2024-04-26 PROCEDURE — 25000003 PHARM REV CODE 250: Performed by: PHYSICIAN ASSISTANT

## 2024-04-26 PROCEDURE — 80053 COMPREHEN METABOLIC PANEL: CPT | Performed by: INTERNAL MEDICINE

## 2024-04-26 PROCEDURE — 36415 COLL VENOUS BLD VENIPUNCTURE: CPT | Performed by: INTERNAL MEDICINE

## 2024-04-26 PROCEDURE — 99233 SBSQ HOSP IP/OBS HIGH 50: CPT | Mod: FS,,, | Performed by: INTERNAL MEDICINE

## 2024-04-26 PROCEDURE — 25000003 PHARM REV CODE 250

## 2024-04-26 RX ORDER — PRAVASTATIN SODIUM 20 MG/1
20 TABLET ORAL NIGHTLY
Status: DISCONTINUED | OUTPATIENT
Start: 2024-04-26 | End: 2024-04-26 | Stop reason: HOSPADM

## 2024-04-26 RX ORDER — METOPROLOL SUCCINATE 25 MG/1
25 TABLET, EXTENDED RELEASE ORAL DAILY
Qty: 30 TABLET | Refills: 0 | Status: SHIPPED | OUTPATIENT
Start: 2024-04-27 | End: 2024-05-27

## 2024-04-26 RX ORDER — NAPROXEN SODIUM 220 MG/1
81 TABLET, FILM COATED ORAL DAILY
Qty: 30 TABLET | Refills: 0 | Status: SHIPPED | OUTPATIENT
Start: 2024-04-27 | End: 2024-05-27

## 2024-04-26 RX ORDER — PRAVASTATIN SODIUM 20 MG/1
20 TABLET ORAL NIGHTLY
Qty: 30 TABLET | Refills: 0 | Status: SHIPPED | OUTPATIENT
Start: 2024-04-26 | End: 2024-05-01

## 2024-04-26 RX ADMIN — ALPRAZOLAM 0.5 MG: 0.5 TABLET ORAL at 08:04

## 2024-04-26 RX ADMIN — SENNOSIDES AND DOCUSATE SODIUM 1 TABLET: 50; 8.6 TABLET ORAL at 08:04

## 2024-04-26 RX ADMIN — ASPIRIN 81 MG CHEWABLE TABLET 81 MG: 81 TABLET CHEWABLE at 08:04

## 2024-04-26 RX ADMIN — METOPROLOL SUCCINATE 25 MG: 25 TABLET, EXTENDED RELEASE ORAL at 08:04

## 2024-04-26 NOTE — ASSESSMENT & PLAN NOTE
Patient is likely to have ischemic hepatitis as evidenced by abnormalities in AST and ALT. Etiology includes  cardiac arrest . Continue to monitor liver function while treating underlying condition. Daily labs to include CMP.    AST   Date Value Ref Range Status   04/26/2024 25 10 - 40 U/L Final     ALT   Date Value Ref Range Status   04/26/2024 55 (H) 10 - 44 U/L Final   Improving

## 2024-04-26 NOTE — ASSESSMENT & PLAN NOTE
- Cardiology consulted  - s/p heparin drip  - [facility] 04/24 with nonobstructive CAD   - continue ASA, statin   - Trop peaked 11.571 and downtrending

## 2024-04-26 NOTE — PLAN OF CARE
[facility] - Telemetry (Hospital)  Discharge Final Note    Primary Care Provider: ANNE Ford Jr., NP    Expected Discharge Date: 4/26/2024    Final Discharge Note (most recent)       Final Note - 04/26/24 1514          Final Note    Assessment Type Final Discharge Note     Anticipated Discharge Disposition Home or Self Care        Post-Acute Status    Discharge Delays None known at this time                     Important Message from Medicare             Contact Info       Pako Main MD   Specialty: Electrophysiology, Cardiology    [address] [place] [id number]   Phone: [phone]       Next Steps: Follow up on 4/30/2024    Instructions: followup for AICD placement    ANNE Ford Jr., NP   Specialty: Internal Medicine   Relationship: PCP - General    [address] [place] [address]   Phone: [phone]       Next Steps: Schedule an appointment as soon as possible for a visit          Patient will schedule PCP follow up. Has follow up scheduled with MAINOR. Saranya fit at bedside. No other CM needs.

## 2024-04-26 NOTE — ASSESSMENT & PLAN NOTE
Patient with acute kidney injury/acute renal failure likely due to  prerenal in setting of cardiac arrest  SLIME is currently stable. Baseline creatinine unknown - Labs reviewed- Renal function/electrolytes with Estimated Creatinine Clearance: 55.8 mL/min (A) (based on SCr of 1.7 mg/dL (H)). according to latest data. Monitor urine output and serial BMP and adjust therapy as needed. Avoid nephrotoxins and renally dose meds for GFR listed above.  Cr improved to 1.7 prior to discharge   Strict Is and Os   Followup with PCP for rpt labs

## 2024-04-26 NOTE — PROGRESS NOTES
"[facility] - Telemetry ([facility])  Cardiology  Progress Note    Patient Name: Milind Melgar  MRN: [id number]  Admission Date: 4/23/2024  Hospital Length of Stay: 3 days  Code Status: Full Code   Attending Physician: Amy Escoto MD   Primary Care Physician: ANNE Ford Jr., NP  Expected Discharge Date:   Principal Problem:Cardiac arrest    Subjective:   HPI:  HPI obtained from patient and spouse at bedside      Mr. Melgar is a 53 year old male patient whose current medical conditions include HTN, hyperlipidemia, diverticulitis, PVC's, and low testosterone on therapy who presented to [facility] ED via EMS s/p cardiac arrest. Patient's family found him collapsed, unresponsive, pulseless, and apneic. CPR was initiated immediately and continued upon EMS arrival. Patient's initial rhythm was reportedly VT and ROSC was obtained after defibrillation x 1. Initial workup revealed lactic acidosis (3.8), hypokalemia (K 3.2), creatinine of 1.7, and troponin of 0.112>6.820 and patient was subsequently intubated and admitted to ICU for further evaluation and treatment. Cardiology consulted to assist with management. Patient seen and examined today, recently extubated. Awake, alert, some mild short term memory loss. He complained of palpitations/"feeling weird" per family prior to arrest. No apparent associated SOB or hayley CP. No prior history of CAD or MI. Very physically active and does Crossfit. Followed by [facility] cardiology (Dr. Philip) and previously noted to have 8% PVC burden and was placed on BB therapy but apparently discontinued the medication shortly after initiation as palpitations resolved completed after donating blood. Prior TTE on file showed normal EF, repeat pending. EKG reviewed, SR, rightward axis, no STEMI. Troponin bumped to 11.571, will continue to trend. LHC today. EP consulted for AICD.        Hospital Course:   4/25/24-Patient seen and examined today, resting in bed. Feels ok. LHC showed non-obs CAD. Labs " reviewed/stable. Occasional PVC's noted on telemetry. EP on board, AICD planned.     4/26/24-Patient seen and examined today, resting in bed. Feels well, back to his baseline. No CP/SOB. BB resumed yesterday, HR stable. LifeVest arranged, AICD planned for 4/30/24, EP on board. Inflammatory markers elevated. Cardiac MRI/PET scheduled.         Review of Systems   Constitutional: Negative.   HENT: Negative.     Eyes: Negative.    Cardiovascular: Negative.    Respiratory: Negative.     Endocrine: Negative.    Hematologic/Lymphatic: Negative.    Skin: Negative.    Musculoskeletal: Negative.    Gastrointestinal: Negative.    Genitourinary: Negative.    Neurological: Negative.    Psychiatric/Behavioral: Negative.     Allergic/Immunologic: Negative.      Objective:     Vital Signs (Most Recent):  Temp: 97.6 °F (36.4 °C) (04/26/24 1219)  Pulse: 67 (04/26/24 1219)  Resp: 16 (04/26/24 1219)  BP: (!) 150/93 (04/26/24 1219)  SpO2: 100 % (04/26/24 1219) Vital Signs (24h Range):  Temp:  [97.6 °F (36.4 °C)-98.6 °F (37 °C)] 97.6 °F (36.4 °C)  Pulse:  [67-87] 67  Resp:  [14-18] 16  SpO2:  [97 %-100 %] 100 %  BP: (139-150)/(86-93) 150/93     Weight: 88 kg (194 lb 0.1 oz)  Body mass index is 28.65 kg/m².     SpO2: 100 %       No intake or output data in the 24 hours ending 04/26/24 1414    Lines/Drains/Airways       Peripheral Intravenous Line  Duration                  Peripheral IV - Single Lumen 04/23/24 2015 18 G Left Antecubital 2 days         Peripheral IV - Single Lumen 04/23/24 2037 18 G Anterior;Left Forearm 2 days         Peripheral IV - Single Lumen 04/23/24 2037 20 G Posterior;Right Hand 2 days         Peripheral IV - Single Lumen 04/24/24 20 G Anterior;Right Forearm 2 days                       Physical Exam  Vitals and nursing note reviewed.   Constitutional:       General: He is not in acute distress.     Appearance: Normal appearance. He is well-developed. He is not diaphoretic.   HENT:      Head: Normocephalic and  atraumatic.   Eyes:      General:         Right eye: No discharge.         Left eye: No discharge.      Pupils: Pupils are equal, round, and reactive to light.   Cardiovascular:      Rate and Rhythm: Normal rate and regular rhythm.      Heart sounds: No murmur heard.  Pulmonary:      Effort: Pulmonary effort is normal. No respiratory distress.      Breath sounds: Normal breath sounds.   Abdominal:      General: There is no distension.   Musculoskeletal:      Right lower leg: No edema.      Left lower leg: No edema.   Skin:     General: Skin is warm and dry.      Findings: No erythema.   Neurological:      General: No focal deficit present.      Mental Status: He is alert and oriented to person, place, and time.   Psychiatric:         Mood and Affect: Mood normal.         Behavior: Behavior normal.            Significant Labs: CMP   Recent Labs   Lab 04/25/24  0359 04/26/24  0910    139   K 3.5 4.3    104   CO2 26 23   * 99   BUN 20 18   CREATININE 1.8* 1.7*   CALCIUM 9.2 11.0*   PROT 5.8* 7.6   ALBUMIN 3.2* 4.0   BILITOT 1.5* 1.2*   ALKPHOS 63 73   AST 39 25   ALT 67* 55*   ANIONGAP 7* 12   , CBC   Recent Labs   Lab 04/25/24  0359 04/26/24  0910   WBC 8.63 8.04   HGB 13.7* 15.6   HCT 38.5* 44.6    201   , and All pertinent lab results from the last 24 hours have been reviewed.    Significant Imaging: Echocardiogram: Transthoracic echo (TTE) complete (Cupid Only):   Results for orders placed or performed during the hospital encounter of 04/23/24   Echo   Result Value Ref Range    BSA 2.07 m2    LVOT stroke volume 73.13 cm3    LVIDd 4.34 3.5 - 6.0 cm    LV Systolic Volume 34.66 mL    LV Systolic Volume Index 17.0 mL/m2    LVIDs 2.99 2.1 - 4.0 cm    LV Diastolic Volume 84.84 mL    LV Diastolic Volume Index 41.59 mL/m2    IVS 1.19 (A) 0.6 - 1.1 cm    LVOT diameter 1.95 cm    LVOT area 3.0 cm2    FS 31 28 - 44 %    Left Ventricle Relative Wall Thickness 0.52 cm    Posterior Wall 1.13 (A) 0.6 -  1.1 cm    LV mass 178.37 g    LV Mass Index 87 g/m2    MV Peak E [place] 1.35 m/s    TDI LATERAL 0.13 m/s    TDI SEPTAL 0.11 m/s    E/E' ratio 11.25 m/s    MV Peak A [place] 0.91 m/s    E/A ratio 1.48     IVRT 49.48 msec    E wave deceleration time 151.19 msec    LV SEPTAL E/E' RATIO 12.27 m/s    LV LATERAL E/E' RATIO 10.38 m/s    LVOT peak [place] 1.30 m/s    Left Ventricular Outflow Tract Mean Velocity 1.07 cm/s    Left Ventricular Outflow Tract Mean Gradient 4.87 mmHg    RVOT peak VTI 15.7 cm    TAPSE 2.05 cm    LA size 3.67 cm    Left Atrium Minor Axis 5.21 cm    Left Atrium Major Axis 5.13 cm    RA Major Axis 4.40 cm    AV mean gradient 7 mmHg    AV peak gradient 12 mmHg    Ao peak [place] 1.72 m/s    Ao VTI 29.40 cm    LVOT peak VTI 24.50 cm    AV valve area 2.49 cm²    AV Velocity Ratio 0.76     AV index (prosthetic) 0.83     RONNI by Velocity Ratio 2.26 cm²    Mr max [place] 3.09 m/s    MV stenosis pressure 1/2 time 43.85 ms    MV valve area p 1/2 method 5.02 cm2    PV mean gradient 3 mmHg    RVOT peak [place] 1.02 m/s    Ao root annulus 3.12 cm    STJ 3.32 cm    Ascending aorta 3.18 cm    IVC diameter 1.95 cm    Mean e' 0.12 m/s    ZLVIDS -1.74     ZLVIDD -3.37     LA Volume Index 26.9 mL/m2    LA volume 54.83 cm3    LA WIDTH 3.4 cm    RA Width 2.8 cm    Narrative      Left Ventricle: The left ventricle is normal in size. Normal wall   thickness. There is concentric remodeling. There is normal systolic   function with a visually estimated ejection fraction of 60 - 65%. Grade II   diastolic dysfunction. Elevated left ventricular filling pressure.    Right Ventricle: Right ventricle was not well visualized due to poor   acoustic window. Right ventricle wall motion  is unable to be assessed.   Systolic function could not be assesed.    Aortic Valve: Aortic valve peak velocity is 1.72 m/s. Mean gradient is   7 mmHg.    Mitral Valve: There is mild regurgitation.    IVC/SVC: Patient is ventilated, cannot use IVC diameter to estimate    right atrial pressure.      and EKG: reviewed  Assessment and Plan:   Patient who presents s/p cardiac arrest. [facility] with non-obs CAD. Meds adjusted. LifeVest arranged, AICD planned for 4/30/24 by EP. OP PET/cardiac MRI also scheduled.     * Cardiac arrest  -Reportedly VT/VF upon arrival, ROSC obtained after CPR and defib x 1  -Stable since admission  -Troponin up-trending   -TTE pending  -[facility] today  -ASA, heparin gtt  -Will optimize meds post procedure     4/25/24  -[facility] showed non-obs CAD  -Continue ASA  -Statin as tolerated      NSTEMI (non-ST elevated myocardial infarction)  -Troponin 0.112>6.820>11.571, continue to trend  -Continue ASA, heparin gtt  -BP soft, unable to further maximize med regimen, will do so as tolerated  -TTE pending  -[facility] planned pending improvement/stability     4/25/24  -[facility] showed non-obs CAD    Hyperlipidemia  -Low dose statin    Hypertension  -Optimize meds post procedure    Lactic acidosis  -Secondary to cardiac arrest    Hypokalemia  -Repletion as per primary team    Shock liver  -Secondary to cardiac arrest  -Monitor    SLIME (acute kidney injury)  -Secondary to cardiac arrest/shock    Ventricular fibrillation  -Reportedly had VT/VF upon EMS arrival  -K initially low at 3.2, has been repleted  -Known history of PVC's, EF previously normal  -Repeat TTE pending  -Continue supportive care  -Ischemic workup/[facility] today  -EP also consulted for AICD consideration    4/25/24  -[facility] with non-obs CAD  -Keep K > 4; Mg > 2  -EP on board, AICD planned    4/26/24  -K > 4; Mg > 2  -[facility] non-obs CAD-ASA, statin, BB  -LifeVest arranged, AICD Planned 4/30/24 by EP        VTE Risk Mitigation (From admission, onward)           Ordered     enoxaparin injection 40 mg  Every 24 hours         04/24/24 1520     IP VTE HIGH RISK PATIENT  Once         04/23/24 2259     Place sequential compression device  Until discontinued         04/23/24 2257                    Amisha Park PA-C  Cardiology  [facility] -  Telemetry ([facility])

## 2024-04-26 NOTE — ASSESSMENT & PLAN NOTE
Patient has hypokalemia which is Acute and currently controlled. Most recent potassium levels reviewed-   Lab Results   Component Value Date    K 4.3 04/26/2024   . Will continue potassium replacement per protocol and recheck repeat levels after replacement completed.

## 2024-04-26 NOTE — ASSESSMENT & PLAN NOTE
Hx palpitations, noted to have 8% PVC burden on Holter monitor in 01/2024. Followed by GARRET Philip   Cardiology and EP following - plan for AICD placement as above

## 2024-04-26 NOTE — ASSESSMENT & PLAN NOTE
-Reportedly VT/VF upon arrival, ROSC obtained after CPR and defib x 1  -Stable since admission  -Troponin up-trending   -TTE pending  -[facility] today  -ASA, heparin gtt  -Will optimize meds post procedure     4/25/24  -[facility] showed non-obs CAD  -Continue ASA  -Statin as tolerated

## 2024-04-26 NOTE — ASSESSMENT & PLAN NOTE
-Troponin 0.112>6.820>11.571, continue to trend  -Continue ASA, heparin gtt  -BP soft, unable to further maximize med regimen, will do so as tolerated  -TTE pending  -[facility] planned pending improvement/stability     4/25/24  -LHC showed non-obs CAD

## 2024-04-26 NOTE — ASSESSMENT & PLAN NOTE
Reported VT arrest, ROSC post defibrillation x 1  Cardiology following   [facility] with nonobstructive  CAD   Continue B blocker   Tentatively planned for AICD placement 04/30 but cardiology recommend Lifevest in the interim   SW consulted for Lifevest, lifevest fitted on 04/26  Followup with EP 04/30 for ICD and 05/02 for followup

## 2024-04-26 NOTE — ASSESSMENT & PLAN NOTE
-Reportedly had VT/VF upon EMS arrival  -K initially low at 3.2, has been repleted  -Known history of PVC's, EF previously normal  -Repeat TTE pending  -Continue supportive care  -Ischemic workup/LHC today  -EP also consulted for AICD consideration    4/25/24  -LHC with non-obs CAD  -Keep K > 4; Mg > 2  -EP on board, AICD planned    4/26/24  -K > 4; Mg > 2  -LHC non-obs CAD-ASA, statin, BB  -LifeVest arranged, AICD Planned 4/30/24 by EP

## 2024-04-26 NOTE — PROGRESS NOTES
"Subjective:   Patient ID:  Milind Melgar is a 53 y.o. male who presents for evaluation of Cardiac Arrest (Pt to ER via [facility] for evaluation s/p cardiac arrest; initial called received at 1910; when [facility] arrived pt was pulseless with CPR in progress; intitial EKG read V Tach; pt received (1) shock and had ROSC; pt also given 3ml of Ketamine to attempt airway placed; definitive airway placement prior to arrival not successful; palpable pulse noted upon arrival; pt being bagged; family reports pt recently c/o "palpitations")      HPI    Milind Melgar is a 53 year old male who presents to [facility] due to cardiac arrest at home s/p defibrillation and ROSC obtained.     Patient has history of palpitations for the past year or more. He was seen by Dr Philip in 2023 with extended monitor which revealed 8% PVCs and was started on acebutolol. He weaned himself off BB in January of this year with no recurrence of palps until yesterday.    Yesterday AM- he had palps in AM and around lunch then noted improvement prior to worsening palps which then resulted in Cardiac Arrest.     He underwent LHC today which revealed nonobstructive CAD, normal EF.     Patient and family at bedside with long conversation. He has been completing Cross Fit recently 1 hour sessions for 5 days per week which he feels he is pushing past his max capacity lately.     Plan for Cardiac MRI for further evaluation. We need to obtain raw data from Extended monitor to assess if any need for ablation in near future. Will plan to implant ICD VDX this admission for secondary prevention.       4/26/2024 update    Patient seen and examined in room with wife and daughter at bedside. Awaiting lifeVest fitting today and discharge home later today.     Resumed BB therapy yesterday, tolerating well.     Plan for ICD VDX implant on Tuesday with Dr Main. Risks, benefits and treatment alternatives reviewed.     Discussed recent lab results with patient and family in " detail.     Reviewed importance of lifevest usage until ICD implanted next week. Plan for Cardiac MRI and PET Sarcoid to be completed soon. Appointments scheduled for June and July accordingly.         Past Medical History:   Diagnosis Date    Diverticulitis     Hyperlipidemia     Hypertension     On mechanically assisted ventilation 04/24/2024    PVC's (premature ventricular contractions)        Past Surgical History:   Procedure Laterality Date    CORONARY ANGIOGRAPHY N/A 4/24/2024    Procedure: ANGIOGRAM, CORONARY ARTERY;  Surgeon: Fitz Ochoa MD;  Location: [facility] CATH LAB;  Service: Cardiology;  Laterality: N/A;    LEFT HEART CATHETERIZATION Left 4/24/2024    Procedure: Left heart cath;  Surgeon: Fitz Ochoa MD;  Location: [facility] CATH LAB;  Service: Cardiology;  Laterality: Left;    VENTRICULOGRAM, LEFT  4/24/2024    Procedure: Ventriculogram, Left;  Surgeon: Fitz Ochoa MD;  Location: [facility] CATH LAB;  Service: Cardiology;;       Social History     Tobacco Use    Smoking status: Never    Smokeless tobacco: Never       No family history on file.    Wt Readings from Last 3 Encounters:   04/24/24 88 kg (194 lb 0.1 oz)     Temp Readings from Last 3 Encounters:   04/26/24 98.6 °F (37 °C) (Oral)     BP Readings from Last 3 Encounters:   04/26/24 (!) 140/89     Pulse Readings from Last 3 Encounters:   04/26/24 77       No current facility-administered medications on file prior to encounter.     Current Outpatient Medications on File Prior to Encounter   Medication Sig Dispense Refill    testosterone cypionate (DEPOTESTOTERONE CYPIONATE) 200 mg/mL injection Inject 200 mg into the muscle every 14 (fourteen) days.         No cardiac monitor results found for the past 12 months    Results for orders placed during the hospital encounter of 04/23/24    Echo    Interpretation Summary    Left Ventricle: The left ventricle is normal in size. Normal wall thickness. There is concentric remodeling. There is normal  systolic function with a visually estimated ejection fraction of 60 - 65%. Grade II diastolic dysfunction. Elevated left ventricular filling pressure.    Right Ventricle: Right ventricle was not well visualized due to poor acoustic window. Right ventricle wall motion  is unable to be assessed. Systolic function could not be assesed.    Aortic Valve: Aortic valve peak velocity is 1.72 m/s. Mean gradient is 7 mmHg.    Mitral Valve: There is mild regurgitation.    IVC/SVC: Patient is ventilated, cannot use IVC diameter to estimate right atrial pressure.    No results found for this or any previous visit.        Results for orders placed or performed during the hospital encounter of 04/23/24   EKG 12-lead    Collection Time: 04/24/24  5:38 AM   Result Value Ref Range    QRS Duration 106 ms    [facility] QTC Calculation 392 ms    Narrative    Test Reason : I46.9,    Vent. Rate : 063 BPM     Atrial Rate : 063 BPM     P-R Int : 168 ms          QRS Dur : 106 ms      QT Int : 384 ms       P-R-T Axes : 056 090 085 degrees     QTc Int : 392 ms    Normal sinus rhythm  Rightward axis  Borderline Abnormal ECG  When compared with ECG of 23-APR-2024 20:09,  Nonspecific T wave abnormality no longer evident in Lateral leads  Confirmed by HAFSA TOBAR MD ([id number]) on 4/24/2024 5:34:22 PM    Referred By: [email or web address]   SELF           Confirmed By:HAFSA TOBAR MD         Review of Systems   Constitutional: Positive for malaise/fatigue.   HENT:  Negative for hearing loss and hoarse voice.    Eyes:  Negative for blurred vision and visual disturbance.   Cardiovascular:  Positive for palpitations. Negative for chest pain, claudication, dyspnea on exertion, irregular heartbeat, leg swelling, near-syncope, orthopnea, paroxysmal nocturnal dyspnea and syncope.   Respiratory:  Negative for cough, hemoptysis, shortness of breath, sleep disturbances due to breathing, snoring and wheezing.    Endocrine: Negative for cold intolerance and heat intolerance.  "  Hematologic/Lymphatic: Does not bruise/bleed easily.   Skin:  Negative for color change, dry skin and nail changes.   Musculoskeletal:  Positive for arthritis and back pain. Negative for joint pain and myalgias.   Gastrointestinal:  Negative for bloating, abdominal pain, constipation, nausea and vomiting.   Genitourinary:  Negative for dysuria, flank pain, hematuria and hesitancy.   Neurological:  Negative for headaches, light-headedness, loss of balance, numbness, paresthesias and weakness.   Psychiatric/Behavioral:  Negative for altered mental status.    Allergic/Immunologic: Negative for environmental allergies.         Objective:BP (!) 140/89   Pulse 77   Temp 98.6 °F (37 °C) (Oral)   Resp 14   Ht 5' 9" (1.753 m)   Wt 88 kg (194 lb 0.1 oz)   SpO2 100%   BMI 28.65 kg/m²      Physical Exam  Vitals and nursing note reviewed.   Constitutional:       General: He is not in acute distress.     Appearance: Normal appearance. He is well-developed. He is not ill-appearing.   HENT:      Head: Normocephalic and atraumatic.      Nose: Nose normal.      Mouth/Throat:      Mouth: Mucous membranes are moist.   Eyes:      Pupils: Pupils are equal, round, and reactive to light.   Neck:      Thyroid: No thyromegaly.      Vascular: No JVD.      Trachea: No tracheal deviation.   Cardiovascular:      Rate and Rhythm: Normal rate and regular rhythm. Frequent Extrasystoles are present.     Chest Wall: PMI is not displaced.      Pulses: Intact distal pulses.           Radial pulses are 2+ on the right side and 2+ on the left side.        Dorsalis pedis pulses are 2+ on the right side and 2+ on the left side.      Heart sounds: S1 normal and S2 normal. Heart sounds not distant. No murmur heard.  Pulmonary:      Effort: Pulmonary effort is normal. No respiratory distress.      Breath sounds: Normal breath sounds. No wheezing.   Abdominal:      General: Bowel sounds are normal. There is no distension.      Palpations: Abdomen is " soft.      Tenderness: There is no abdominal tenderness.   Musculoskeletal:         General: No swelling. Normal range of motion.      Cervical back: Full passive range of motion without pain, normal range of motion and neck supple.      Right lower leg: No edema.      Left lower leg: No edema.      Right ankle: No swelling.      Left ankle: No swelling.   Skin:     General: Skin is warm and dry.      Capillary Refill: Capillary refill takes less than 2 seconds.      Nails: There is no clubbing.   Neurological:      General: No focal deficit present.      Mental Status: He is alert and oriented to person, place, and time.      Motor: No weakness.   Psychiatric:         Speech: Speech normal.         Behavior: Behavior normal.         Thought Content: Thought content normal.         Judgment: Judgment normal.         Lab Results   Component Value Date    CHOL 181 04/24/2024     Lab Results   Component Value Date    HDL 42 04/24/2024     Lab Results   Component Value Date    LDLCALC 109.6 04/24/2024     Lab Results   Component Value Date    TRIG 147 04/24/2024     Lab Results   Component Value Date    CHOLHDL 23.2 04/24/2024     [unfilled]  Lab Results   Component Value Date    TSH 1.607 04/24/2024     Lab Results   Component Value Date    INR 0.9 04/23/2024     Lab Results   Component Value Date    WBC 8.04 04/26/2024    HGB 15.6 04/26/2024    HCT 44.6 04/26/2024    MCV 92 04/26/2024     04/26/2024     BNP  Recent Labs   Lab 04/23/24 2027   BNP <10     Estimated Creatinine Clearance: 55.8 mL/min (A) (based on SCr of 1.7 mg/dL (H)).     Assessment:      1. Cardiac arrest    2. V-tach    3. SLIME (acute kidney injury)    4. NSTEMI (non-ST elevated myocardial infarction)        Plan:   Cardiac Arrest, s/p resuscitation     -non-obstructive CAD per [facility] today  -ECHO pending  -Needs ICD VDX this admission  -Cardiac MRI asap  -Obtain raw data of extended monitor from Dr Philip's office ASAP  -Keep K+>4 and Mag  >2  -Further recs to follow after review of monitor data      4/26/2024  -LifeVest for SCD prevention until ICD implant on Tuesday  -Continue BB  -Cardiac MRI and PET Sarcoid scheduled  -Further recs to follow in clinic  -EP clinic follow up post ICD implant.     Results for orders placed during the hospital encounter of 04/23/24    Echo    Interpretation Summary    Left Ventricle: The left ventricle is normal in size. Normal wall thickness. There is concentric remodeling. There is normal systolic function with a visually estimated ejection fraction of 60 - 65%. Grade II diastolic dysfunction. Elevated left ventricular filling pressure.    Right Ventricle: Right ventricle was not well visualized due to poor acoustic window. Right ventricle wall motion  is unable to be assessed. Systolic function could not be assesed.    Aortic Valve: Aortic valve peak velocity is 1.72 m/s. Mean gradient is 7 mmHg.    Mitral Valve: There is mild regurgitation.    IVC/SVC: Patient is ventilated, cannot use IVC diameter to estimate right atrial pressure.        ANA Agrawal  [facility]

## 2024-04-26 NOTE — DISCHARGE SUMMARY
[facility] ([facility] Medicine  Discharge Summary      Patient Name: Milind Melgar  MRN: [id number]  [facility]: [id number]  Patient Class: IP- Inpatient  Admission Date: 4/23/2024  Hospital Length of Stay: 3 days  Discharge Date and Time:  04/26/2024 3:06 PM  Attending Physician: Amy Escoto MD   Discharging Provider: Amy Escoto MD  Primary Care Provider: ANNE Ford Jr., NP    Primary Care Team: [facility] MEDICINE B    HPI:   Pt is a 53-year-old male with a past medical history of palpitations/PVCs, HTN, hyperlipidemia, diverticulitis who initially presented to the ED on 04/23 via EMS after suffering cardiac arrest at home. Family found patient unresponsive, pulseless and apneic and started CPR. LKW 5 minutes prior. On EMS arrival, initial rhythm was pulseless ventricular tachycardia. ROSC was achieved after 1 defibrillation.  Family reported that patient complained of feeling palpitations on afternoon of presentation. He denied chest pain/discomfort, shortness of breath prior to episode. . Per chart review, pt had cardiology visit in January this year for eval of palpitations, 10 day Holter monitor at the time showing no arrhythmias other than PVC's with 8% burden. Patient was started on acebutolol in January, but took himself off of his medications approx 2 weeks later.  He reports he had been doing well since then, had improvement in symptoms after blood donation, no recurrence of palpitations until the day of arrest. Denies recent illness, changes to medications, or use of caffeine/workout supplements. Does note that he had done an intense workout on morning of arrest.      ED course revealed EKG with R axis deviation, QTc 437. CBC shows WBC 15.2. . Chemistry w/K 3.2, CO2 20, BUN/Cr 16/1.7, glucose 195, , . Troponin 0.112, BNP <10. Procalcitonin negative. UDS negative. Lactic acid 3.8. CXR w/no infiltrates, opacities, vascular congestion, or effusions.  CT head  negative. Pt intubated, sedated in the ED.  Cardiology consulted and recommended heparin protocol and if any recurrence of NSVT to start amiodarone. He was admitted to ICU for further management. He was extubated on 04/24/2024. Underwent LHC on 04/24 which showed nonobstructive CAD. EP was consulted and pt is tentatively planned for AICD placement 04/30/2024. In the interim, cardiology recommend lifevest and return as outpatient to have AICD placed. Pt was deemed stable for transfer to the floor on 04/25/2024. Hospital Medicine consulted. Pt seen upon transfer to the floor, multiple family members at bedside. Pt awake, alert. He denies CP, SOB, palpitations, dizziness at this time.     Procedure(s) (LRB):  Left heart cath (Left)  ANGIOGRAM, CORONARY ARTERY (N/A)  Ventriculogram, Left      Hospital Course:   Cr stable at 1.7. LFT elevation resolved. Pt was fitted with Lifevest prior to discharge. Inflammatory markers elevated, pt planned for outpatient cardiac MRI/PET scan per EP/cardiology for further work up. Pt deemed stable for discharge home from cardiology standpoint. Will followup as scheduled on 04/30 for ICD placement and followup with EP on 05/02. Followup with PCP within 3-5 days. Pt seen and examined on day of discharge, reports some generalized soreness but no CP, SOB, palpitations. He appears stable for discharge home today with close EP followup. See below for further details.      Goals of Care Treatment Preferences:  Code Status: Full Code      Consults:   Consults (From admission, onward)          Status Ordering Provider     Inpatient consult to Social Work/Case Management  Once        Provider:  (Not yet assigned)    Completed JENNA CHAWLA     Inpatient consult to Hospitalist  Once        Provider:  (Not yet assigned)    Completed JACKIE LEACH     Inpatient consult to Electrophysiology  Once        Provider:  Pako Main MD    Completed HAFSA TOBAR     Inpatient consult to  Cardiology  Once        Provider:  Fitz Ochoa MD    Completed FATEMEH LOVING     Inpatient consult to Registered Dietitian/Nutritionist  Once        Provider:  (Not yet assigned)    Completed JACKIE LEACH            Cardiac/Vascular  * Cardiac arrest  Reported VT arrest, ROSC post defibrillation x 1  Cardiology following   [facility] with nonobstructive  CAD   Continue B blocker   Tentatively planned for AICD placement 04/30 but cardiology recommend Lifevest in the interim   SW consulted for Lifevest, lifevest fitted on 04/26  Followup with EP 04/30 for ICD and 05/02 for followup   Inflammatory markers elevated, EP planning Cardiac MRI and PET as outpatient     NSTEMI (non-ST elevated myocardial infarction)  - Cardiology consulted  - s/p heparin drip  - [facility] 04/24 with nonobstructive CAD   - continue ASA, statin   - Trop peaked 11.571 and downtrending       History of palpitations  Hx palpitations, noted to have 8% PVC burden on Holter monitor in 01/2024. Followed by GARRET Philip   Cardiology and EP following - plan for AICD placement as above       Ventricular fibrillation  See Cardiac Arrest for Plan       Renal/  Lactic acidosis  - resolved, likely in setting of cardiac arrest     Hypokalemia  Patient has hypokalemia which is Acute and currently controlled. Most recent potassium levels reviewed-   Lab Results   Component Value Date    K 4.3 04/26/2024   . Will continue potassium replacement per protocol and recheck repeat levels after replacement completed.     SLIME (acute kidney injury)  Patient with acute kidney injury/acute renal failure likely due to  prerenal in setting of cardiac arrest  SLIME is currently stable. Baseline creatinine unknown - Labs reviewed- Renal function/electrolytes with Estimated Creatinine Clearance: 55.8 mL/min (A) (based on SCr of 1.7 mg/dL (H)). according to latest data. Monitor urine output and serial BMP and adjust therapy as needed. Avoid nephrotoxins and renally dose  meds for GFR listed above.  Cr improved to 1.7 prior to discharge   Strict Is and Os   Followup with PCP for rpt labs     GI  Shock liver  Patient is likely to have ischemic hepatitis as evidenced by abnormalities in AST and ALT. Etiology includes  cardiac arrest . Continue to monitor liver function while treating underlying condition. Daily labs to include CMP.    AST   Date Value Ref Range Status   04/26/2024 25 10 - 40 U/L Final     ALT   Date Value Ref Range Status   04/26/2024 55 (H) 10 - 44 U/L Final   Improving       Final Active Diagnoses:    Diagnosis Date Noted POA    PRINCIPAL PROBLEM:  Cardiac arrest [I46.9] 04/24/2024 Yes    Ventricular fibrillation [I49.01] 04/24/2024 Yes    History of palpitations [Z87.898] 04/24/2024 Not Applicable     Chronic    NSTEMI (non-ST elevated myocardial infarction) [I21.4] 04/24/2024 Yes    SLIME (acute kidney injury) [N17.9] 04/24/2024 Yes    Shock liver [K72.00] 04/24/2024 Yes    Hypokalemia [E87.6] 04/24/2024 Yes    Lactic acidosis [E87.20] 04/24/2024 Yes    Hypertension [I10] 04/24/2024 Yes     Chronic    Hyperlipidemia [E78.5] 04/24/2024 Yes     Chronic      Problems Resolved During this Admission:    Diagnosis Date Noted Date Resolved POA    Severe sepsis [A41.9, R65.20] 04/24/2024 04/24/2024 Yes    On mechanically assisted ventilation [Z99.11] 04/24/2024 04/25/2024 Not Applicable       Discharged Condition: good    Disposition: Home or Self Care    Follow Up:   Follow-up Information       Pako Main MD Follow up on 4/30/2024.    Specialties: Electrophysiology, Cardiology  Why: followup for AICD placement  Contact information:  [address]  [facility] [address] [phone]               ANNE Ford Jr., NP. Schedule an appointment as soon as possible for a visit.    Specialty: Internal Medicine  Contact information:  [address] [place] [id number] [phone]                           Patient Instructions:      Diet  Cardiac     Notify your health care provider if you experience any of the following:  increased confusion or weakness     Notify your health care provider if you experience any of the following:  persistent dizziness, light-headedness, or visual disturbances     Other restrictions (specify):   Order Comments: No intense exercise until cleared by cardiology to do so       Significant Diagnostic Studies: See Hospital Course     Pending Diagnostic Studies:       Procedure Component Value Units Date/Time    B. burgdorferi Abs (Lyme Disease) [[id number]] Collected: 04/25/24 1421    Order Status: Sent Lab Status: In process Updated: 04/25/24 2209    Specimen: Blood     Lyme Disease Western Blot [[id number]] Collected: 04/25/24 1421    Order Status: Sent Lab Status: In process Updated: 04/25/24 2209    Specimen: Blood     Lyme disease DNA probe, direct [[id number]] Collected: 04/25/24 1421    Order Status: Sent Lab Status: In process Updated: 04/25/24 2209    Specimen: Blood     Testosterone Panel [[id number]] Collected: 04/25/24 0920    Order Status: Sent Lab Status: In process Updated: 04/25/24 1624    Specimen: Blood            Medications:  Reconciled Home Medications:      Medication List        START taking these medications      aspirin 81 MG Chew  Take 1 tablet (81 mg total) by mouth once daily.  Start taking on: April 27, 2024     metoprolol succinate 25 MG 24 hr tablet  Commonly known as: TOPROL-XL  Take 1 tablet (25 mg total) by mouth once daily.  Start taking on: April 27, 2024     pravastatin 20 MG tablet  Commonly known as: PRAVACHOL  Take 1 tablet (20 mg total) by mouth every evening.            CONTINUE taking these medications      testosterone cypionate 200 mg/mL injection  Commonly known as: DEPOTESTOTERONE CYPIONATE  Inject 200 mg into the muscle every 14 (fourteen) days.              Indwelling Lines/Drains at time of discharge:   Lines/Drains/Airways       None                   Time spent on the  discharge of patient: 50 minutes         Amy Escoto MD  Department of Hospital Medicine  '[place] - Telemetry ([facility])

## 2024-04-26 NOTE — HOSPITAL COURSE
Cr stable at 1.7. LFT elevation resolved. Pt was fitted with Lifevest prior to discharge. Inflammatory markers elevated, pt planned for outpatient cardiac MRI/PET scan per EP/cardiology for further work up. Pt deemed stable for discharge home from cardiology standpoint. Will followup as scheduled on 04/30 for ICD placement and followup with EP on 05/02. Followup with PCP within 3-5 days. Pt seen and examined on day of discharge, reports some generalized soreness but no CP, SOB, palpitations. He appears stable for discharge home today with close EP followup. See below for further details.

## 2024-04-26 NOTE — SUBJECTIVE & OBJECTIVE
Review of Systems   Constitutional: Negative.   HENT: Negative.     Eyes: Negative.    Cardiovascular: Negative.    Respiratory: Negative.     Endocrine: Negative.    Hematologic/Lymphatic: Negative.    Skin: Negative.    Musculoskeletal: Negative.    Gastrointestinal: Negative.    Genitourinary: Negative.    Neurological: Negative.    Psychiatric/Behavioral: Negative.     Allergic/Immunologic: Negative.      Objective:     Vital Signs (Most Recent):  Temp: 97.6 °F (36.4 °C) (04/26/24 1219)  Pulse: 67 (04/26/24 1219)  Resp: 16 (04/26/24 1219)  BP: (!) 150/93 (04/26/24 1219)  SpO2: 100 % (04/26/24 1219) Vital Signs (24h Range):  Temp:  [97.6 °F (36.4 °C)-98.6 °F (37 °C)] 97.6 °F (36.4 °C)  Pulse:  [67-87] 67  Resp:  [14-18] 16  SpO2:  [97 %-100 %] 100 %  BP: (139-150)/(86-93) 150/93     Weight: 88 kg (194 lb 0.1 oz)  Body mass index is 28.65 kg/m².     SpO2: 100 %       No intake or output data in the 24 hours ending 04/26/24 1414    Lines/Drains/Airways       Peripheral Intravenous Line  Duration                  Peripheral IV - Single Lumen 04/23/24 2015 18 G Left Antecubital 2 days         Peripheral IV - Single Lumen 04/23/24 2037 18 G Anterior;Left Forearm 2 days         Peripheral IV - Single Lumen 04/23/24 2037 20 G Posterior;Right Hand 2 days         Peripheral IV - Single Lumen 04/24/24 20 G Anterior;Right Forearm 2 days                       Physical Exam  Vitals and nursing note reviewed.   Constitutional:       General: He is not in acute distress.     Appearance: Normal appearance. He is well-developed. He is not diaphoretic.   HENT:      Head: Normocephalic and atraumatic.   Eyes:      General:         Right eye: No discharge.         Left eye: No discharge.      Pupils: Pupils are equal, round, and reactive to light.   Cardiovascular:      Rate and Rhythm: Normal rate and regular rhythm.      Heart sounds: No murmur heard.  Pulmonary:      Effort: Pulmonary effort is normal. No respiratory  distress.      Breath sounds: Normal breath sounds.   Abdominal:      General: There is no distension.   Musculoskeletal:      Right lower leg: No edema.      Left lower leg: No edema.   Skin:     General: Skin is warm and dry.      Findings: No erythema.   Neurological:      General: No focal deficit present.      Mental Status: He is alert and oriented to person, place, and time.   Psychiatric:         Mood and Affect: Mood normal.         Behavior: Behavior normal.            Significant Labs: CMP   Recent Labs   Lab 04/25/24  0359 04/26/24  0910    139   K 3.5 4.3    104   CO2 26 23   * 99   BUN 20 18   CREATININE 1.8* 1.7*   CALCIUM 9.2 11.0*   PROT 5.8* 7.6   ALBUMIN 3.2* 4.0   BILITOT 1.5* 1.2*   ALKPHOS 63 73   AST 39 25   ALT 67* 55*   ANIONGAP 7* 12   , CBC   Recent Labs   Lab 04/25/24  0359 04/26/24  0910   WBC 8.63 8.04   HGB 13.7* 15.6   HCT 38.5* 44.6    201   , and All pertinent lab results from the last 24 hours have been reviewed.    Significant Imaging: Echocardiogram: Transthoracic echo (TTE) complete (Cupid Only):   Results for orders placed or performed during the hospital encounter of 04/23/24   Echo   Result Value Ref Range    BSA 2.07 m2    LVOT stroke volume 73.13 cm3    LVIDd 4.34 3.5 - 6.0 cm    LV Systolic Volume 34.66 mL    LV Systolic Volume Index 17.0 mL/m2    LVIDs 2.99 2.1 - 4.0 cm    LV Diastolic Volume 84.84 mL    LV Diastolic Volume Index 41.59 mL/m2    IVS 1.19 (A) 0.6 - 1.1 cm    LVOT diameter 1.95 cm    LVOT area 3.0 cm2    FS 31 28 - 44 %    Left Ventricle Relative Wall Thickness 0.52 cm    Posterior Wall 1.13 (A) 0.6 - 1.1 cm    LV mass 178.37 g    LV Mass Index 87 g/m2    MV Peak E [place] 1.35 m/s    TDI LATERAL 0.13 m/s    TDI SEPTAL 0.11 m/s    E/E' ratio 11.25 m/s    MV Peak A [place] 0.91 m/s    E/A ratio 1.48     IVRT 49.48 msec    E wave deceleration time 151.19 msec    LV SEPTAL E/E' RATIO 12.27 m/s    LV LATERAL E/E' RATIO 10.38 m/s    LVOT peak  janis 1.30 m/s    Left Ventricular Outflow Tract Mean Velocity 1.07 cm/s    Left Ventricular Outflow Tract Mean Gradient 4.87 mmHg    RVOT peak VTI 15.7 cm    TAPSE 2.05 cm    LA size 3.67 cm    Left Atrium Minor Axis 5.21 cm    Left Atrium Major Axis 5.13 cm    RA Major Axis 4.40 cm    AV mean gradient 7 mmHg    AV peak gradient 12 mmHg    Ao peak janis 1.72 m/s    Ao VTI 29.40 cm    LVOT peak VTI 24.50 cm    AV valve area 2.49 cm²    AV Velocity Ratio 0.76     AV index (prosthetic) 0.83     RONNI by Velocity Ratio 2.26 cm²    Mr max janis 3.09 m/s    MV stenosis pressure 1/2 time 43.85 ms    MV valve area p 1/2 method 5.02 cm2    PV mean gradient 3 mmHg    RVOT peak janis 1.02 m/s    Ao root annulus 3.12 cm    STJ 3.32 cm    Ascending aorta 3.18 cm    IVC diameter 1.95 cm    Mean e' 0.12 m/s    ZLVIDS -1.74     ZLVIDD -3.37     LA Volume Index 26.9 mL/m2    LA volume 54.83 cm3    LA WIDTH 3.4 cm    RA Width 2.8 cm    Narrative      Left Ventricle: The left ventricle is normal in size. Normal wall   thickness. There is concentric remodeling. There is normal systolic   function with a visually estimated ejection fraction of 60 - 65%. Grade II   diastolic dysfunction. Elevated left ventricular filling pressure.    Right Ventricle: Right ventricle was not well visualized due to poor   acoustic window. Right ventricle wall motion  is unable to be assessed.   Systolic function could not be assesed.    Aortic Valve: Aortic valve peak velocity is 1.72 m/s. Mean gradient is   7 mmHg.    Mitral Valve: There is mild regurgitation.    IVC/SVC: Patient is ventilated, cannot use IVC diameter to estimate   right atrial pressure.      and EKG: reviewed

## 2024-04-28 LAB
ACINETOBACTER CALCOACETICUS/BAUMANNII COMPLEX: NOT DETECTED
B BURGDOR DNA BLD QL NAA+PROBE: NEGATIVE
B GAR+AFZ OPPA1 BLD QL NAA+NON-PROBE: NEGATIVE
B MAYONII OPPA1 BLD QL NAA+NON-PROBE: NEGATIVE
BACTEROIDES FRAGILIS: NOT DETECTED
CANDIDA ALBICANS: NOT DETECTED
CANDIDA AURIS: NOT DETECTED
CANDIDA GLABRATA: NOT DETECTED
CANDIDA KRUSEI: NOT DETECTED
CANDIDA PARAPSILOSIS: NOT DETECTED
CANDIDA TROPICALIS: NOT DETECTED
CRYPTOCOCCUS NEOFORMANS/GATTII: NOT DETECTED
CTX-M GENE (ESBL PRODUCER): NORMAL
ENTEROBACTER CLOACAE COMPLEX: NOT DETECTED
ENTEROBACTERALES: NOT DETECTED
ENTEROCOCCUS FAECALIS: NOT DETECTED
ENTEROCOCCUS FAECIUM: NOT DETECTED
ESCHERICHIA COLI: NOT DETECTED
HAEMOPHILUS INFLUENZAE: NOT DETECTED
IMP GENE (CARBAPENEM RESISTANT): NORMAL
KLEBSIELLA AEROGENES: NOT DETECTED
KLEBSIELLA OXYTOCA: NOT DETECTED
KLEBSIELLA PNEUMONIAE GROUP: NOT DETECTED
KPC RESISTANCE GENE (CARBAPENEM): NORMAL
LISTERIA MONOCYTOGENES: NOT DETECTED
MCR-1: NORMAL
MEC A/C AND MREJ (MRSA): NORMAL
MEC A/C: NORMAL
MICROBIOLOGIST REVIEW: NORMAL
NDM GENE (CARBAPENEM RESISTANT): NORMAL
NEISSERIA MENINGITIDIS: NOT DETECTED
OXA-48-LIKE (CARBAPENEM RESISTANT): NORMAL
PROTEUS SPECIES: NOT DETECTED
PSEUDOMONAS AERUGINOSA: NOT DETECTED
SALMONELLA SP: NOT DETECTED
SERRATIA MARCESCENS: NOT DETECTED
STAPHYLOCOCCUS AUREUS: NOT DETECTED
STAPHYLOCOCCUS EPIDERMIDIS: NOT DETECTED
STAPHYLOCOCCUS LUGDUNESIS: NOT DETECTED
STAPHYLOCOCCUS SPECIES: NOT DETECTED
STENOTROPHOMONAS MALTOPHILIA: NOT DETECTED
STREPTOCOCCUS AGALACTIAE: NOT DETECTED
STREPTOCOCCUS PNEUMONIAE: NOT DETECTED
STREPTOCOCCUS PYOGENES: NOT DETECTED
STREPTOCOCCUS SPECIES: NOT DETECTED
VAN A/B (VRE GENE): NORMAL
VIM GENE (CARBAPENEM RESISTANT): NORMAL

## 2024-04-29 ENCOUNTER — TELEPHONE (OUTPATIENT)
Dept: HEPATOLOGY | Facility: HOSPITAL | Age: 54
End: 2024-04-29
Payer: COMMERCIAL

## 2024-04-29 ENCOUNTER — HOSPITAL ENCOUNTER (INPATIENT)
Facility: HOSPITAL | Age: 54
LOS: 1 days | Discharge: HOME HEALTH | DRG: 872 | End: 2024-05-01
Attending: EMERGENCY MEDICINE | Admitting: FAMILY MEDICINE
Payer: COMMERCIAL

## 2024-04-29 DIAGNOSIS — I46.9 CARDIAC ARREST: ICD-10-CM

## 2024-04-29 DIAGNOSIS — R07.9 CHEST PAIN: ICD-10-CM

## 2024-04-29 DIAGNOSIS — R78.81 POSITIVE BLOOD CULTURE: ICD-10-CM

## 2024-04-29 LAB
ALBUMIN SERPL BCP-MCNC: 4 G/DL (ref 3.5–5.2)
ALP SERPL-CCNC: 61 U/L (ref 55–135)
ALT SERPL W/O P-5'-P-CCNC: 38 U/L (ref 10–44)
ANION GAP SERPL CALC-SCNC: 8 MMOL/L (ref 8–16)
AST SERPL-CCNC: 18 U/L (ref 10–40)
B BURGDOR AB SER IA-ACNC: 0.04 INDEX VALUE
B BURGDOR IGG SER QL IB: NEGATIVE
B BURGDOR IGM SER QL IB: NEGATIVE
BASOPHILS # BLD AUTO: 0.02 K/UL (ref 0–0.2)
BASOPHILS NFR BLD: 0.3 % (ref 0–1.9)
BILIRUB SERPL-MCNC: 0.7 MG/DL (ref 0.1–1)
BUN SERPL-MCNC: 36 MG/DL (ref 6–20)
CALCIUM SERPL-MCNC: 10.4 MG/DL (ref 8.7–10.5)
CHLORIDE SERPL-SCNC: 108 MMOL/L (ref 95–110)
CO2 SERPL-SCNC: 21 MMOL/L (ref 23–29)
CREAT SERPL-MCNC: 1.5 MG/DL (ref 0.5–1.4)
DIFFERENTIAL METHOD BLD: ABNORMAL
EOSINOPHIL # BLD AUTO: 0 K/UL (ref 0–0.5)
EOSINOPHIL NFR BLD: 0.5 % (ref 0–8)
ERYTHROCYTE [DISTWIDTH] IN BLOOD BY AUTOMATED COUNT: 11.9 % (ref 11.5–14.5)
EST. GFR  (NO RACE VARIABLE): 56 ML/MIN/1.73 M^2
GLUCOSE SERPL-MCNC: 93 MG/DL (ref 70–110)
HCT VFR BLD AUTO: 37.2 % (ref 40–54)
HGB BLD-MCNC: 13.4 G/DL (ref 14–18)
IMM GRANULOCYTES # BLD AUTO: 0.04 K/UL (ref 0–0.04)
IMM GRANULOCYTES NFR BLD AUTO: 0.7 % (ref 0–0.5)
LACTATE SERPL-SCNC: 1.3 MMOL/L (ref 0.5–2.2)
LYMPHOCYTES # BLD AUTO: 1.4 K/UL (ref 1–4.8)
LYMPHOCYTES NFR BLD: 23.6 % (ref 18–48)
MCH RBC QN AUTO: 31.8 PG (ref 27–31)
MCHC RBC AUTO-ENTMCNC: 36 G/DL (ref 32–36)
MCV RBC AUTO: 88 FL (ref 82–98)
MONOCYTES # BLD AUTO: 0.5 K/UL (ref 0.3–1)
MONOCYTES NFR BLD: 8.4 % (ref 4–15)
NEUTROPHILS # BLD AUTO: 3.9 K/UL (ref 1.8–7.7)
NEUTROPHILS NFR BLD: 66.5 % (ref 38–73)
NRBC BLD-RTO: 0 /100 WBC
PLATELET # BLD AUTO: 212 K/UL (ref 150–450)
PMV BLD AUTO: 9.8 FL (ref 9.2–12.9)
POTASSIUM SERPL-SCNC: 4.3 MMOL/L (ref 3.5–5.1)
PROT SERPL-MCNC: 7.1 G/DL (ref 6–8.4)
RBC # BLD AUTO: 4.22 M/UL (ref 4.6–6.2)
SODIUM SERPL-SCNC: 137 MMOL/L (ref 136–145)
WBC # BLD AUTO: 5.81 K/UL (ref 3.9–12.7)

## 2024-04-29 PROCEDURE — 96366 THER/PROPH/DIAG IV INF ADDON: CPT

## 2024-04-29 PROCEDURE — 96365 THER/PROPH/DIAG IV INF INIT: CPT

## 2024-04-29 PROCEDURE — G0378 HOSPITAL OBSERVATION PER HR: HCPCS

## 2024-04-29 PROCEDURE — 87040 BLOOD CULTURE FOR BACTERIA: CPT | Performed by: EMERGENCY MEDICINE

## 2024-04-29 PROCEDURE — 25000003 PHARM REV CODE 250: Performed by: INTERNAL MEDICINE

## 2024-04-29 PROCEDURE — 80053 COMPREHEN METABOLIC PANEL: CPT | Performed by: EMERGENCY MEDICINE

## 2024-04-29 PROCEDURE — 96372 THER/PROPH/DIAG INJ SC/IM: CPT | Performed by: INTERNAL MEDICINE

## 2024-04-29 PROCEDURE — 83605 ASSAY OF LACTIC ACID: CPT | Performed by: EMERGENCY MEDICINE

## 2024-04-29 PROCEDURE — 85025 COMPLETE CBC W/AUTO DIFF WBC: CPT | Performed by: EMERGENCY MEDICINE

## 2024-04-29 PROCEDURE — 63600175 PHARM REV CODE 636 W HCPCS: Performed by: INTERNAL MEDICINE

## 2024-04-29 PROCEDURE — 99285 EMERGENCY DEPT VISIT HI MDM: CPT | Mod: 25

## 2024-04-29 RX ORDER — METOPROLOL SUCCINATE 25 MG/1
25 TABLET, EXTENDED RELEASE ORAL DAILY
Status: DISCONTINUED | OUTPATIENT
Start: 2024-04-29 | End: 2024-05-01 | Stop reason: HOSPADM

## 2024-04-29 RX ORDER — IBUPROFEN 200 MG
24 TABLET ORAL
Status: DISCONTINUED | OUTPATIENT
Start: 2024-04-29 | End: 2024-05-01 | Stop reason: HOSPADM

## 2024-04-29 RX ORDER — NALOXONE HCL 0.4 MG/ML
0.02 VIAL (ML) INJECTION
Status: DISCONTINUED | OUTPATIENT
Start: 2024-04-29 | End: 2024-05-01 | Stop reason: HOSPADM

## 2024-04-29 RX ORDER — SIMETHICONE 80 MG
1 TABLET,CHEWABLE ORAL 4 TIMES DAILY PRN
Status: DISCONTINUED | OUTPATIENT
Start: 2024-04-29 | End: 2024-05-01 | Stop reason: HOSPADM

## 2024-04-29 RX ORDER — ENOXAPARIN SODIUM 100 MG/ML
40 INJECTION SUBCUTANEOUS EVERY 24 HOURS
Status: DISCONTINUED | OUTPATIENT
Start: 2024-04-29 | End: 2024-05-01 | Stop reason: HOSPADM

## 2024-04-29 RX ORDER — ALUMINUM HYDROXIDE, MAGNESIUM HYDROXIDE, AND SIMETHICONE 1200; 120; 1200 MG/30ML; MG/30ML; MG/30ML
30 SUSPENSION ORAL 4 TIMES DAILY PRN
Status: DISCONTINUED | OUTPATIENT
Start: 2024-04-29 | End: 2024-05-01 | Stop reason: HOSPADM

## 2024-04-29 RX ORDER — SODIUM CHLORIDE 0.9 % (FLUSH) 0.9 %
10 SYRINGE (ML) INJECTION EVERY 12 HOURS PRN
Status: DISCONTINUED | OUTPATIENT
Start: 2024-04-29 | End: 2024-05-01 | Stop reason: HOSPADM

## 2024-04-29 RX ORDER — GLUCAGON 1 MG
1 KIT INJECTION
Status: DISCONTINUED | OUTPATIENT
Start: 2024-04-29 | End: 2024-05-01 | Stop reason: HOSPADM

## 2024-04-29 RX ORDER — NAPROXEN SODIUM 220 MG/1
81 TABLET, FILM COATED ORAL DAILY
Status: DISCONTINUED | OUTPATIENT
Start: 2024-04-29 | End: 2024-05-01 | Stop reason: HOSPADM

## 2024-04-29 RX ORDER — IBUPROFEN 200 MG
16 TABLET ORAL
Status: DISCONTINUED | OUTPATIENT
Start: 2024-04-29 | End: 2024-05-01 | Stop reason: HOSPADM

## 2024-04-29 RX ORDER — TALC
6 POWDER (GRAM) TOPICAL NIGHTLY PRN
Status: DISCONTINUED | OUTPATIENT
Start: 2024-04-29 | End: 2024-05-01 | Stop reason: HOSPADM

## 2024-04-29 RX ORDER — PRAVASTATIN SODIUM 20 MG/1
20 TABLET ORAL NIGHTLY
Status: DISCONTINUED | OUTPATIENT
Start: 2024-04-29 | End: 2024-05-01 | Stop reason: HOSPADM

## 2024-04-29 RX ADMIN — METOPROLOL SUCCINATE 25 MG: 25 TABLET, FILM COATED, EXTENDED RELEASE ORAL at 03:04

## 2024-04-29 RX ADMIN — AMPICILLIN AND SULBACTAM 3 G: 2; 1 INJECTION, POWDER, FOR SOLUTION INTRAMUSCULAR; INTRAVENOUS at 08:04

## 2024-04-29 RX ADMIN — ASPIRIN 81 MG CHEWABLE TABLET 81 MG: 81 TABLET CHEWABLE at 03:04

## 2024-04-29 RX ADMIN — AMPICILLIN AND SULBACTAM 3 G: 2; 1 INJECTION, POWDER, FOR SOLUTION INTRAMUSCULAR; INTRAVENOUS at 12:04

## 2024-04-29 RX ADMIN — ENOXAPARIN SODIUM 40 MG: 40 INJECTION SUBCUTANEOUS at 05:04

## 2024-04-29 RX ADMIN — PRAVASTATIN SODIUM 20 MG: 20 TABLET ORAL at 08:04

## 2024-04-29 NOTE — HPI
The patient is 54 yo male with past medical history of hypertension, ventricular tachycardia and recent cardiac arrest who presented to the ED after being called for positive blood cultures. He had blood cultures obtained 4/23/24. The cultures isolated gram positive rods 4/28/24. He reports he has been feeling fine and was scheduled to have ICD placement 4/30. He has LifeVest on. Repeat blood cultures obtained in the ED. Hospital medicine consulted. Empiric IV antibiotics initiated

## 2024-04-29 NOTE — ASSESSMENT & PLAN NOTE
Chronic, controlled. Latest blood pressure and vitals reviewed-     Temp:  [98.3 °F (36.8 °C)]   Pulse:  [89-92]   Resp:  [18]   BP: (125-140)/(56-77)   SpO2:  [100 %] .   Home meds for hypertension were reviewed and noted below.   Hypertension Medications               metoprolol succinate (TOPROL-XL) 25 MG 24 hr tablet Take 1 tablet (25 mg total) by mouth once daily.            While in the hospital, will manage blood pressure as follows; Continue home antihypertensive regimen

## 2024-04-29 NOTE — TELEPHONE ENCOUNTER
Patient called on 04/29 to notify him of positive blood cultures on 04/23. Prelim blood cultures show GPR. Pt and spouse notified to come into the ER for repeat blood cultures and potential admission for IV antibiotics. They expressed understanding. All questions answered to their satisfaction,

## 2024-04-29 NOTE — ASSESSMENT & PLAN NOTE
Ventricular tachycardia arrest  -maintain potassium greater than 4  -continue LifeVest  -discussed with patient timing of ICD placement dependent upon if infection present

## 2024-04-29 NOTE — ED PROVIDER NOTES
Encounter Date: 4/29/2024       History     Chief Complaint   Patient presents with    abnormal labs     Called back to the ed for repeat Blood culture collection. State he had a positive culture last week and was treated with abx so he needs his cultures redrawn.      Patient here for evaluation recent admission for cardiac arrest.  He did get blood cultures and they grew out gram-positive cocci was sent here for positive blood culture workup.    No fever chills nausea vomiting since his discharge.  No chest pain or shortness of breath and no rigors.      Review of patient's allergies indicates:  No Known Allergies  Past Medical History:   Diagnosis Date    Diverticulitis     Hyperlipidemia     Hypertension     On mechanically assisted ventilation 04/24/2024    PVC's (premature ventricular contractions)      Past Surgical History:   Procedure Laterality Date    CORONARY ANGIOGRAPHY N/A 4/24/2024    Procedure: ANGIOGRAM, CORONARY ARTERY;  Surgeon: Fitz Ochoa MD;  Location: [facility] CATH LAB;  Service: Cardiology;  Laterality: N/A;    LEFT HEART CATHETERIZATION Left 4/24/2024    Procedure: Left heart cath;  Surgeon: Fitz Ochoa MD;  Location: [facility] CATH LAB;  Service: Cardiology;  Laterality: Left;    VENTRICULOGRAM, LEFT  4/24/2024    Procedure: Ventriculogram, Left;  Surgeon: Fitz Ochoa MD;  Location: [facility] CATH LAB;  Service: Cardiology;;     No family history on file.  Social History     Tobacco Use    Smoking status: Never    Smokeless tobacco: Never     Review of Systems   Constitutional:  Negative for fever.   HENT:  Negative for sore throat.    Respiratory:  Negative for shortness of breath.    Cardiovascular:  Negative for chest pain.   Gastrointestinal:  Negative for nausea.   Genitourinary:  Negative for dysuria.   Musculoskeletal:  Negative for back pain.   Skin:  Negative for rash.   Neurological:  Negative for weakness.   Hematological:  Does not bruise/bleed easily.       Physical Exam      Initial Vitals [04/29/24 0900]   BP Pulse Resp Temp SpO2   (!) 125/56 92 18 98.3 °F (36.8 °C) 100 %      MAP       --         Physical Exam    Nursing note and vitals reviewed.  Constitutional: He appears well-developed and well-nourished.   HENT:   Head: Normocephalic and atraumatic.   Nose: Nose normal.   Mouth/Throat: Oropharynx is clear and moist.   Eyes: Conjunctivae and EOM are normal.   Neck: Neck supple.   Normal range of motion.  Cardiovascular:  Normal rate, regular rhythm, normal heart sounds and intact distal pulses.           Pulmonary/Chest: Breath sounds normal.   Abdominal: Abdomen is soft. Bowel sounds are normal.   Musculoskeletal:         General: Normal range of motion.      Cervical back: Normal range of motion and neck supple.     Neurological: He is alert and oriented to person, place, and time. He has normal strength.   Skin: Skin is warm and dry.   Psychiatric: He has a normal mood and affect. Thought content normal.         ED Course   Procedures  Labs Reviewed   CBC W/ AUTO DIFFERENTIAL - Abnormal; Notable for the following components:       Result Value    RBC 4.22 (*)     Hemoglobin 13.4 (*)     Hematocrit 37.2 (*)     MCH 31.8 (*)     Immature Granulocytes 0.7 (*)     All other components within normal limits   COMPREHENSIVE METABOLIC PANEL - Abnormal; Notable for the following components:    CO2 21 (*)     BUN 36 (*)     Creatinine 1.5 (*)     eGFR 56 (*)     All other components within normal limits   CULTURE, BLOOD   CULTURE, BLOOD   LACTIC ACID, PLASMA     Results for orders placed or performed during the hospital encounter of 04/29/24   CBC auto differential   Result Value Ref Range    WBC 5.81 3.90 - 12.70 K/uL    RBC 4.22 (L) 4.60 - 6.20 M/uL    Hemoglobin 13.4 (L) 14.0 - 18.0 g/dL    Hematocrit 37.2 (L) 40.0 - 54.0 %    MCV 88 82 - 98 fL    MCH 31.8 (H) 27.0 - 31.0 pg    MCHC 36.0 32.0 - 36.0 g/dL    RDW 11.9 11.5 - 14.5 %    Platelets 212 150 - 450 K/uL    MPV 9.8 9.2 -  12.9 fL    Immature Granulocytes 0.7 (H) 0.0 - 0.5 %    Gran # (ANC) 3.9 1.8 - 7.7 K/uL    Immature Grans (Abs) 0.04 0.00 - 0.04 K/uL    Lymph # 1.4 1.0 - 4.8 K/uL    Mono # 0.5 0.3 - 1.0 K/uL    Eos # 0.0 0.0 - 0.5 K/uL    Baso # 0.02 0.00 - 0.20 K/uL    nRBC 0 0 /100 WBC    Gran % 66.5 38.0 - 73.0 %    Lymph % 23.6 18.0 - 48.0 %    Mono % 8.4 4.0 - 15.0 %    Eosinophil % 0.5 0.0 - 8.0 %    Basophil % 0.3 0.0 - 1.9 %    Differential Method Automated    Comprehensive metabolic panel   Result Value Ref Range    Sodium 137 136 - 145 mmol/L    Potassium 4.3 3.5 - 5.1 mmol/L    Chloride 108 95 - 110 mmol/L    CO2 21 (L) 23 - 29 mmol/L    Glucose 93 70 - 110 mg/dL    BUN 36 (H) 6 - 20 mg/dL    Creatinine 1.5 (H) 0.5 - 1.4 mg/dL    Calcium 10.4 8.7 - 10.5 mg/dL    Total Protein 7.1 6.0 - 8.4 g/dL    Albumin 4.0 3.5 - 5.2 g/dL    Total Bilirubin 0.7 0.1 - 1.0 mg/dL    Alkaline Phosphatase 61 55 - 135 U/L    AST 18 10 - 40 U/L    ALT 38 10 - 44 U/L    eGFR 56 (A) >60 mL/min/1.73 m^2    Anion Gap 8 8 - 16 mmol/L   Lactic acid, plasma #1   Result Value Ref Range    Lactate (Lactic Acid) 1.3 0.5 - 2.2 mmol/L            Imaging Results    None          Medications   ampicillin-sulbactam (UNASYN) 3 g in sodium chloride 0.9 % 100 mL IVPB (MB+) (has no administration in time range)   aspirin chewable tablet 81 mg (has no administration in time range)   metoprolol succinate (TOPROL-XL) 24 hr tablet 25 mg (has no administration in time range)   pravastatin tablet 20 mg (has no administration in time range)   sodium chloride 0.9% flush 10 mL (has no administration in time range)   naloxone 0.4 mg/mL injection 0.02 mg (has no administration in time range)   glucose chewable tablet 16 g (has no administration in time range)   glucose chewable tablet 24 g (has no administration in time range)   glucagon (human recombinant) injection 1 mg (has no administration in time range)   enoxaparin injection 40 mg (has no administration in time  range)   melatonin tablet 6 mg (has no administration in time range)   aluminum-magnesium hydroxide-simethicone 200-200-20 mg/5 mL suspension 30 mL (has no administration in time range)   simethicone chewable tablet 80 mg (has no administration in time range)     Medical Decision Making  Patient with positive blood cultures sent here for workup.    Amount and/or Complexity of Data Reviewed  Labs: ordered. Decision-making details documented in ED Course.     Details: Labs within normal for patient  Discussion of management or test interpretation with external provider(s): Patient will be admitted to observation for positive blood cultures.  He is fully asymptomatic.  Unasyn  IV ordered for now.    Hospital medicine was consulted and he will be admitted to observation to Dr. Mccall                                      Clinical Impression:  Final diagnoses:  [R78.81] Positive blood culture          ED Disposition Condition    Observation                 DoMiles MD  04/29/24 0498

## 2024-04-29 NOTE — PHARMACY MED REC
"Admission Medication History     The home medication history was taken by Tu Avendano.    You may go to "Admission" then "Reconcile Home Medications" tabs to review and/or act upon these items.     The home medication list has been updated by the Pharmacy department.   Please read ALL comments highlighted in yellow.   Please address this information as you see fit.    Feel free to contact us if you have any questions or require assistance.      Medications listed below were obtained from: Patient/family and Analytic software- [employer]      Tu Avendano  [id number]      Current Outpatient Medications on File Prior to Encounter   Medication Sig Dispense Refill Last Dose    aspirin 81 MG Chew Take 1 tablet (81 mg total) by mouth once daily. 30 tablet 0 4/28/2024    metoprolol succinate (TOPROL-XL) 25 MG 24 hr tablet Take 1 tablet (25 mg total) by mouth once daily. 30 tablet 0 4/28/2024    pravastatin (PRAVACHOL) 20 MG tablet Take 1 tablet (20 mg total) by mouth every evening. (Patient not taking: Reported on 4/29/2024) 30 tablet 0 Not Taking    testosterone cypionate (DEPOTESTOTERONE CYPIONATE) 200 mg/mL injection Inject 200 mg into the muscle every 14 (fourteen) days.   4/21/2024                           .          "

## 2024-04-29 NOTE — SUBJECTIVE & OBJECTIVE
Past Medical History:   Diagnosis Date    Diverticulitis     Hyperlipidemia     Hypertension     On mechanically assisted ventilation 04/24/2024    PVC's (premature ventricular contractions)        Past Surgical History:   Procedure Laterality Date    CORONARY ANGIOGRAPHY N/A 4/24/2024    Procedure: ANGIOGRAM, CORONARY ARTERY;  Surgeon: Fitz Ochoa MD;  Location: [facility] CATH LAB;  Service: Cardiology;  Laterality: N/A;    LEFT HEART CATHETERIZATION Left 4/24/2024    Procedure: Left heart cath;  Surgeon: Fitz Ochoa MD;  Location: [facility] CATH LAB;  Service: Cardiology;  Laterality: Left;    VENTRICULOGRAM, LEFT  4/24/2024    Procedure: Ventriculogram, Left;  Surgeon: Fitz Ochoa MD;  Location: [facility] CATH LAB;  Service: Cardiology;;       Review of patient's allergies indicates:  No Known Allergies    Current Facility-Administered Medications   Medication Dose Route Frequency Provider Last Rate Last Admin    aluminum-magnesium hydroxide-simethicone 200-200-20 mg/5 mL suspension 30 mL  30 mL Oral QID PRN Caron Mccall MD        ampicillin-sulbactam (UNASYN) 3 g in sodium chloride 0.9 % 100 mL IVPB (MB+)  3 g Intravenous Q6H Caron Mccall  mL/hr at 04/29/24 1254 3 g at 04/29/24 1254    aspirin chewable tablet 81 mg  81 mg Oral Daily Caron Mccall MD        enoxaparin injection 40 mg  40 mg Subcutaneous Daily Caron Mccall MD        glucagon (human recombinant) injection 1 mg  1 mg Intramuscular PRN Caron Mccall MD        glucose chewable tablet 16 g  16 g Oral PRN Caron Mccall MD        glucose chewable tablet 24 g  24 g Oral PRN Caron Mccall MD        melatonin tablet 6 mg  6 mg Oral Nightly PRN Caron Mccall MD        metoprolol succinate (TOPROL-XL) 24 hr tablet 25 mg  25 mg Oral Daily Caron Mccall MD        naloxone 0.4 mg/mL injection 0.02 mg  0.02 mg Intravenous PRN Caron Mccall MD        pravastatin tablet 20 mg  20 mg Oral QHS  Caron Mccall MD        simethicone chewable tablet 80 mg  1 tablet Oral QID PRN Caron Mccall MD        sodium chloride 0.9% flush 10 mL  10 mL Intravenous Q12H PRN Caron Mccall MD         Current Outpatient Medications   Medication Sig Dispense Refill    aspirin 81 MG Chew Take 1 tablet (81 mg total) by mouth once daily. 30 tablet 0    metoprolol succinate (TOPROL-XL) 25 MG 24 hr tablet Take 1 tablet (25 mg total) by mouth once daily. 30 tablet 0    pravastatin (PRAVACHOL) 20 MG tablet Take 1 tablet (20 mg total) by mouth every evening. (Patient not taking: Reported on 4/29/2024) 30 tablet 0    testosterone cypionate (DEPOTESTOTERONE CYPIONATE) 200 mg/mL injection Inject 200 mg into the muscle every 14 (fourteen) days.       Family History    None       Tobacco Use    Smoking status: Never    Smokeless tobacco: Never   Substance and Sexual Activity    Alcohol use: Not on file    Drug use: Not on file    Sexual activity: Not on file     Review of Systems   Constitutional:  Negative for activity change, appetite change, chills and fever.   All other systems reviewed and are negative.    Objective:     Vital Signs (Most Recent):  Temp: 98.3 °F (36.8 °C) (04/29/24 0900)  Pulse: 89 (04/29/24 1103)  Resp: 18 (04/29/24 1103)  BP: (!) 140/77 (04/29/24 1103)  SpO2: 100 % (04/29/24 1103) Vital Signs (24h Range):  Temp:  [98.3 °F (36.8 °C)] 98.3 °F (36.8 °C)  Pulse:  [89-92] 89  Resp:  [18] 18  SpO2:  [100 %] 100 %  BP: (125-140)/(56-77) 140/77     Weight: 85.5 kg (188 lb 7.9 oz)  Body mass index is 27.84 kg/m².     Physical Exam  HENT:      Head: Normocephalic and atraumatic.   Cardiovascular:      Rate and Rhythm: Normal rate and regular rhythm.      Heart sounds: No murmur heard.  Pulmonary:      Effort: Pulmonary effort is normal. No respiratory distress.      Breath sounds: Normal breath sounds. No wheezing.   Abdominal:      General: Bowel sounds are normal. There is no distension.      Palpations:  Abdomen is soft.      Tenderness: There is no abdominal tenderness.   Musculoskeletal:         General: No swelling.   Skin:     General: Skin is warm and dry.      Findings: Bruising present.   Neurological:      Mental Status: He is alert and oriented to person, place, and time. Mental status is at baseline.                Significant Labs: All pertinent labs within the past 24 hours have been reviewed.  Blood Culture: pending  CBC:   Recent Labs   Lab 04/29/24  0947   WBC 5.81   HGB 13.4*   HCT 37.2*        CMP:   Recent Labs   Lab 04/29/24  0947      K 4.3      CO2 21*   GLU 93   BUN 36*   CREATININE 1.5*   CALCIUM 10.4   PROT 7.1   ALBUMIN 4.0   BILITOT 0.7   ALKPHOS 61   AST 18   ALT 38   ANIONGAP 8       Significant Imaging: I have reviewed all pertinent imaging results/findings within the past 24 hours.

## 2024-04-29 NOTE — ASSESSMENT & PLAN NOTE
-cultures from 4/23 became positive on 4/28  -identification pending  -repeat cultures obtained 4/29  -empiric antibiotics to cover gram positive rods initiated after blood cultures obtained  -cultures from 4/23 to have more information on 4/30 per micro lab  -discussed with patient treating with IV antibiotics for now as possible infection present

## 2024-04-29 NOTE — ED NOTES
Pt resting in ED stretcher comfortably, skin warm and dry, RR even and unlabored. SPENCERS. NADN.

## 2024-04-30 LAB
ALBUMIN SERPL BCP-MCNC: 4 G/DL (ref 3.5–5.2)
ALP SERPL-CCNC: 66 U/L (ref 55–135)
ALT SERPL W/O P-5'-P-CCNC: 46 U/L (ref 10–44)
ANION GAP SERPL CALC-SCNC: 8 MMOL/L (ref 8–16)
AST SERPL-CCNC: 22 U/L (ref 10–40)
BACTERIA BLD CULT: ABNORMAL
BASOPHILS # BLD AUTO: 0.05 K/UL (ref 0–0.2)
BASOPHILS NFR BLD: 0.8 % (ref 0–1.9)
BILIRUB SERPL-MCNC: 0.5 MG/DL (ref 0.1–1)
BUN SERPL-MCNC: 40 MG/DL (ref 6–20)
CALCIUM SERPL-MCNC: 9.8 MG/DL (ref 8.7–10.5)
CHLORIDE SERPL-SCNC: 107 MMOL/L (ref 95–110)
CO2 SERPL-SCNC: 26 MMOL/L (ref 23–29)
CREAT SERPL-MCNC: 1.5 MG/DL (ref 0.5–1.4)
DIFFERENTIAL METHOD BLD: ABNORMAL
EOSINOPHIL # BLD AUTO: 0 K/UL (ref 0–0.5)
EOSINOPHIL NFR BLD: 0.5 % (ref 0–8)
ERYTHROCYTE [DISTWIDTH] IN BLOOD BY AUTOMATED COUNT: 12 % (ref 11.5–14.5)
EST. GFR  (NO RACE VARIABLE): 56 ML/MIN/1.73 M^2
GLUCOSE SERPL-MCNC: 110 MG/DL (ref 70–110)
HCT VFR BLD AUTO: 34.5 % (ref 40–54)
HGB BLD-MCNC: 12 G/DL (ref 14–18)
IMM GRANULOCYTES # BLD AUTO: 0.03 K/UL (ref 0–0.04)
IMM GRANULOCYTES NFR BLD AUTO: 0.5 % (ref 0–0.5)
LYMPHOCYTES # BLD AUTO: 1.3 K/UL (ref 1–4.8)
LYMPHOCYTES NFR BLD: 20.3 % (ref 18–48)
MAGNESIUM SERPL-MCNC: 2.1 MG/DL (ref 1.6–2.6)
MCH RBC QN AUTO: 31.7 PG (ref 27–31)
MCHC RBC AUTO-ENTMCNC: 34.8 G/DL (ref 32–36)
MCV RBC AUTO: 91 FL (ref 82–98)
MONOCYTES # BLD AUTO: 0.7 K/UL (ref 0.3–1)
MONOCYTES NFR BLD: 10.4 % (ref 4–15)
NEUTROPHILS # BLD AUTO: 4.4 K/UL (ref 1.8–7.7)
NEUTROPHILS NFR BLD: 67.5 % (ref 38–73)
NRBC BLD-RTO: 0 /100 WBC
PLATELET # BLD AUTO: 235 K/UL (ref 150–450)
PMV BLD AUTO: 10.3 FL (ref 9.2–12.9)
POTASSIUM SERPL-SCNC: 4.2 MMOL/L (ref 3.5–5.1)
PROT SERPL-MCNC: 6.5 G/DL (ref 6–8.4)
RBC # BLD AUTO: 3.78 M/UL (ref 4.6–6.2)
SODIUM SERPL-SCNC: 141 MMOL/L (ref 136–145)
WBC # BLD AUTO: 6.56 K/UL (ref 3.9–12.7)

## 2024-04-30 PROCEDURE — 63600175 PHARM REV CODE 636 W HCPCS: Performed by: STUDENT IN AN ORGANIZED HEALTH CARE EDUCATION/TRAINING PROGRAM

## 2024-04-30 PROCEDURE — 80053 COMPREHEN METABOLIC PANEL: CPT | Performed by: INTERNAL MEDICINE

## 2024-04-30 PROCEDURE — 96366 THER/PROPH/DIAG IV INF ADDON: CPT

## 2024-04-30 PROCEDURE — 11000001 HC ACUTE MED/SURG PRIVATE ROOM

## 2024-04-30 PROCEDURE — 99223 1ST HOSP IP/OBS HIGH 75: CPT | Mod: ,,, | Performed by: STUDENT IN AN ORGANIZED HEALTH CARE EDUCATION/TRAINING PROGRAM

## 2024-04-30 PROCEDURE — 25000003 PHARM REV CODE 250: Performed by: INTERNAL MEDICINE

## 2024-04-30 PROCEDURE — 85025 COMPLETE CBC W/AUTO DIFF WBC: CPT | Performed by: INTERNAL MEDICINE

## 2024-04-30 PROCEDURE — 25000003 PHARM REV CODE 250: Performed by: NURSE PRACTITIONER

## 2024-04-30 PROCEDURE — 83735 ASSAY OF MAGNESIUM: CPT | Performed by: INTERNAL MEDICINE

## 2024-04-30 PROCEDURE — 63600175 PHARM REV CODE 636 W HCPCS: Performed by: INTERNAL MEDICINE

## 2024-04-30 PROCEDURE — 36415 COLL VENOUS BLD VENIPUNCTURE: CPT | Performed by: INTERNAL MEDICINE

## 2024-04-30 PROCEDURE — 25000003 PHARM REV CODE 250: Performed by: STUDENT IN AN ORGANIZED HEALTH CARE EDUCATION/TRAINING PROGRAM

## 2024-04-30 RX ORDER — ALPRAZOLAM 0.25 MG/1
0.25 TABLET ORAL ONCE
Status: COMPLETED | OUTPATIENT
Start: 2024-04-30 | End: 2024-04-30

## 2024-04-30 RX ORDER — ALPRAZOLAM 0.25 MG/1
0.25 TABLET ORAL 3 TIMES DAILY PRN
COMMUNITY

## 2024-04-30 RX ADMIN — AMPICILLIN AND SULBACTAM 3 G: 2; 1 INJECTION, POWDER, FOR SOLUTION INTRAMUSCULAR; INTRAVENOUS at 03:04

## 2024-04-30 RX ADMIN — AMPICILLIN AND SULBACTAM 3 G: 2; 1 INJECTION, POWDER, FOR SOLUTION INTRAMUSCULAR; INTRAVENOUS at 08:04

## 2024-04-30 RX ADMIN — ENOXAPARIN SODIUM 40 MG: 40 INJECTION SUBCUTANEOUS at 04:04

## 2024-04-30 RX ADMIN — ASPIRIN 81 MG CHEWABLE TABLET 81 MG: 81 TABLET CHEWABLE at 09:04

## 2024-04-30 RX ADMIN — METOPROLOL SUCCINATE 25 MG: 25 TABLET, FILM COATED, EXTENDED RELEASE ORAL at 09:04

## 2024-04-30 RX ADMIN — ALPRAZOLAM 0.25 MG: 0.25 TABLET ORAL at 08:04

## 2024-04-30 RX ADMIN — PRAVASTATIN SODIUM 20 MG: 20 TABLET ORAL at 08:04

## 2024-04-30 RX ADMIN — CEFTRIAXONE 2 G: 2 INJECTION, POWDER, FOR SOLUTION INTRAMUSCULAR; INTRAVENOUS at 04:04

## 2024-04-30 NOTE — PLAN OF CARE
[facility] - Med Surg 3  Initial Discharge Assessment       Primary Care Provider: ANNE Ford Jr., NP    Admission Diagnosis: Cardiac arrest [I46.9]  Chest pain [R07.9]  Positive blood culture [R78.81]    Admission Date: 4/29/2024  Expected Discharge Date:     Transition of Care Barriers: None    Payor: BLUE CROSS BLUE SHIELD / Plan: BCBS BLUE SAVER PPO - HD / Product Type: PPO /     Extended Emergency Contact Information  Primary Emergency Contact: maria teresa patton  Mobile Phone: [phone]  Relation: Spouse  Preferred language: English   needed? No    Discharge Plan A: [facility]         [id number] - [place], [place] - [address] [place] [address] AT [facility] [address] [address] [facility] [address]  [place] [address]  Phone: [phone] Fax: [phone]    [id number] - [place] - [address] AT [facility] [address]  [address]  [place] [address]  Phone: [phone] Fax: [phone]      Initial Assessment (most recent)       Adult Discharge Assessment - 04/30/24 1459          Discharge Assessment    Assessment Type Discharge Planning Assessment     Confirmed/corrected address, phone number and insurance Yes     Confirmed Demographics Correct on Facesheet     Source of Information family     Communicated DAVID with patient/caregiver Yes     Reason For Admission sepsis     People in Home spouse;child(angela), adult     Facility Arrived From: home     Do you expect to return to your current living situation? Yes     Do you have help at home or someone to help you manage your care at home? Yes     Who are your caregiver(s) and their phone number(s)? spouse     Prior to hospitilization cognitive status: Alert/Oriented     Current cognitive status: Alert/Oriented     Walking or Climbing Stairs Difficulty no     Dressing/Bathing Difficulty no     Equipment Currently Used at Home other (see comments)   life vest    Readmission within  30 days? Yes     Patient currently being followed by outpatient case management? No     Do you currently have service(s) that help you manage your care at home? No     Do you take prescription medications? Yes     Do you have prescription coverage? Yes     Coverage BCBS     Do you have any problems affording any of your prescribed medications? No     Who is going to help you get home at discharge? spouse     How do you get to doctors appointments? family or friend will provide     Are you on dialysis? No     Do you take coumadin? No     Discharge Plan A Home Health     DME Needed Upon Discharge  none     Discharge Plan discussed with: Spouse/sig other;Patient     Transition of Care Barriers None                     Readmission Assessment (most recent)       Readmission Assessment - 04/30/24 1510          Readmission    Was this a planned readmission? Yes (P)      Tell me about what happened between when you left the hospital and the day you returned. Patient was called to return to the hospital due to a positive blood culture. (P)                       Discharge plan is home with home health/IV infusion.

## 2024-04-30 NOTE — HPI
This is a 52 yo M with history of hypertension, hyperlipidemia, diverticulitis, palpitations due to PVC's, low testosterone-on therapy, who presented to the ER on 4/23 via EMS after suffering cardiac arrest at home. Per family, patient was found collapsed, unresponsive, pulseless, and apneic. Patient today tells me he must have been down at least 5 minutes before revival. During previous admission, he was extubated on 04/24/2024. Underwent LHC on 04/24 which showed nonobstructive CAD. EP was consulted and planned for AICD placement 04/30/2024. In the interim, cardiology recommended lifevest and return as outpatient to have AICD placed. Transferred to medical floor and discharged. Then on 4/29, hospitalist called to notify patient of positive blood cultures collected on 4/23. Preliminary culture data showed GPR on gram stain with negative BCID panel. Has now finalized as Cutibacterium acnes. Patient had received 3 days of pip/tazo last admission and now one day of amp/sulbactam. Has been afebrile with no leukocytosis. ID consulted for bacteremia.

## 2024-04-30 NOTE — CONSULTS
[facility]  Infectious Disease  Consult Note    Patient Name: Milind Melgar  MRN: [id number]  Admission Date: 4/29/2024  Hospital Length of Stay: 0 days  Attending Physician: Neil Rodriguez MD  Primary Care Provider: ANNE Ford Jr., NP     Isolation Status: No active isolations    Patient information was obtained from patient, spouse/SO, ER records, and primary team.      Inpatient consult to Infectious Diseases  Consult performed by: Mir Randall DO  Consult ordered by: Neil Rodriguez MD        Assessment/Plan:     Cardiac/Vascular  Hypertension  Continue current medications per primary     Ventricular fibrillation  Planning for ICD placement; recommend waiting until antibiotic course is complete; see bacteremia     ID  * Positive blood culture  --Two bottles have grown Cutibacterium acnes   --Micro lab unable to run susceptibilities   --Typically S to penicillin and cephalosporins   --Source unclear   --Common bacterium of oral and GI/ tract   --Due to unclear source and plans for ICD placement, recommend 14 day course of IV ceftriaxone 2 g daily   --Follow repeat blood cx 4/29 for clearance   --After final day of IV antibiotic patient clear for ICD placement from ID standpoint  --Will schedule ID clinic follow up  --Can place PICC  --Above d/w primary team.      Outpatient Antibiotic Therapy Plan:    1) Infection: Cutibacterium bacteremia     2) Discharge Antibiotics:    Intravenous antibiotics:  IV ceftriaxone 2 g daily     3) Therapy Duration:  14 days from negative blood cx     Estimated end date of IV antibiotics: 5/13/24    4) Outpatient Weekly Labs:    Order the following labs to be drawn on Mondays:   CBC  CMP     5) Fax Lab Results to Infectious Diseases Provider: Dr. [facility] Fax Number: [phone]         Thank you for your consult. I will sign off. Please contact us if you have any additional questions.    Mir Randall DO  Infectious Disease  [facility]    Subjective:     Principal Problem: Positive blood culture    HPI: This is a 52 yo M with history of hypertension, hyperlipidemia, diverticulitis, palpitations due to PVC's, low testosterone-on therapy, who presented to the ER on 4/23 via EMS after suffering cardiac arrest at home. Per family, patient was found collapsed, unresponsive, pulseless, and apneic. Patient today tells me he must have been down at least 5 minutes before revival. During previous admission, he was extubated on 04/24/2024. Underwent LHC on 04/24 which showed nonobstructive CAD. EP was consulted and planned for AICD placement 04/30/2024. In the interim, cardiology recommended lifevest and return as outpatient to have AICD placed. Transferred to medical floor and discharged. Then on 4/29, hospitalist called to notify patient of positive blood cultures collected on 4/23. Preliminary culture data showed GPR on gram stain with negative BCID panel. Has now finalized as Cutibacterium acnes. Patient had received 3 days of pip/tazo last admission and now one day of amp/sulbactam. Has been afebrile with no leukocytosis. ID consulted for bacteremia.            Past Medical History:   Diagnosis Date    Diverticulitis     Hyperlipidemia     Hypertension     On mechanically assisted ventilation 04/24/2024    PVC's (premature ventricular contractions)        Past Surgical History:   Procedure Laterality Date    CORONARY ANGIOGRAPHY N/A 4/24/2024    Procedure: ANGIOGRAM, CORONARY ARTERY;  Surgeon: Fitz Ochoa MD;  Location: [facility] CATH LAB;  Service: Cardiology;  Laterality: N/A;    LEFT HEART CATHETERIZATION Left 4/24/2024    Procedure: Left heart cath;  Surgeon: Fitz Ochoa MD;  Location: [facility] CATH LAB;  Service: Cardiology;  Laterality: Left;    VENTRICULOGRAM, LEFT  4/24/2024    Procedure: Ventriculogram, Left;  Surgeon: Fitz Ochoa MD;  Location: [facility] CATH LAB;  Service: Cardiology;;       Review of patient's allergies  indicates:  No Known Allergies    Medications:  Current Facility-Administered Medications   Medication Dose Route Frequency Provider Last Rate Last Admin    aluminum-magnesium hydroxide-simethicone 200-200-20 mg/5 mL suspension 30 mL  30 mL Oral QID PRN Caron Mccall MD        aspirin chewable tablet 81 mg  81 mg Oral Daily Caron Mccall MD   81 mg at 04/30/24 0924    cefTRIAXone (ROCEPHIN) 2 g in dextrose 5 % in water (D5W) 100 mL IVPB (MB+)  2 g Intravenous Q24H Mir Randall DO        enoxaparin injection 40 mg  40 mg Subcutaneous Daily Caron Mccall MD   40 mg at 04/29/24 1735    glucagon (human recombinant) injection 1 mg  1 mg Intramuscular PRN Caron Mccall MD        glucose chewable tablet 16 g  16 g Oral PRN Caron Mccall MD        glucose chewable tablet 24 g  24 g Oral PRN Caron Mccall MD        melatonin tablet 6 mg  6 mg Oral Nightly PRN Caron Mccall MD        metoprolol succinate (TOPROL-XL) 24 hr tablet 25 mg  25 mg Oral Daily Caron Mccall MD   25 mg at 04/30/24 0924    naloxone 0.4 mg/mL injection 0.02 mg  0.02 mg Intravenous PRN Caron Mccall MD        pravastatin tablet 20 mg  20 mg Oral QHS Caron Mccall MD   20 mg at 04/29/24 2016    simethicone chewable tablet 80 mg  1 tablet Oral QID PRN Caron Mccall MD        sodium chloride 0.9% flush 10 mL  10 mL Intravenous Q12H PRN Caron Mccall MD         Antibiotics (From admission, onward)      Start     Stop Route Frequency Ordered    04/30/24 1500  cefTRIAXone (ROCEPHIN) 2 g in dextrose 5 % in water (D5W) 100 mL IVPB (MB+)         05/14/24 1459 IV Every 24 hours (non-standard times) 04/30/24 1239          Antifungals (From admission, onward)      None          Antivirals (From admission, onward)      None             There is no immunization history for the selected administration types on file for this patient.    Family History    None       Social History      Socioeconomic History    Marital status:    Tobacco Use    Smoking status: Never    Smokeless tobacco: Never     Social Determinants of Health     Financial Resource Strain: Low Risk  (4/25/2024)    Overall Financial Resource Strain (CARDIA)     Difficulty of Paying Living Expenses: Not hard at all   Food Insecurity: No Food Insecurity (4/25/2024)    Hunger Vital Sign     Worried About Running Out of Food in the Last Year: Never true     Ran Out of Food in the Last Year: Never true   Transportation Needs: No Transportation Needs (4/25/2024)    PRAPARE - Transportation     Lack of Transportation (Medical): No     Lack of Transportation (Non-Medical): No   Stress: No Stress Concern Present (4/25/2024)    [employer] [employer] - Occupational Stress Questionnaire     Feeling of Stress : Not at all   Housing Stability: Low Risk  (4/25/2024)    Housing Stability Vital Sign     Unable to Pay for Housing in the Last Year: No     Homeless in the Last Year: No     Review of Systems   Constitutional:  Positive for activity change.   Cardiovascular:         Wears Lifevest   All other systems reviewed and are negative.    Objective:     Vital Signs (Most Recent):  Temp: 98.6 °F (37 °C) (04/30/24 1239)  Pulse: 81 (04/30/24 1239)  Resp: 20 (04/30/24 1239)  BP: 117/69 (04/30/24 1239)  SpO2: 99 % (04/30/24 1239) Vital Signs (24h Range):  Temp:  [97 °F (36.1 °C)-98.6 °F (37 °C)] 98.6 °F (37 °C)  Pulse:  [73-89] 81  Resp:  [16-20] 20  SpO2:  [98 %-100 %] 99 %  BP: (110-130)/(69-87) 117/69     Weight: 85.5 kg (188 lb 7.9 oz)  Body mass index is 27.84 kg/m².    Estimated Creatinine Clearance: 62.4 mL/min (A) (based on SCr of 1.5 mg/dL (H)).     Physical Exam  Constitutional:       General: He is not in acute distress.     Appearance: Normal appearance. He is not ill-appearing.   Cardiovascular:      Rate and Rhythm: Normal rate and regular rhythm.      Pulses: Normal pulses.      Heart sounds: Normal heart  sounds. No murmur heard.     No friction rub. No gallop.      Comments: Wearing a Lifevest  Pulmonary:      Effort: Pulmonary effort is normal. No respiratory distress.      Breath sounds: Normal breath sounds.   Abdominal:      General: Abdomen is flat. Bowel sounds are normal. There is no distension.      Palpations: Abdomen is soft.      Tenderness: There is no abdominal tenderness.   Skin:     General: Skin is warm and dry.   Neurological:      Mental Status: He is alert.          Significant Labs: Blood Culture:   Recent Labs   Lab 04/23/24 2027 04/23/24 2028 04/29/24 0948   LABBLOO Gram stain shelley bottle: Gram positive rods  Positive results previously called 04/28/2024  23:17 Gram stain shelley bottle: Gram positive rods  Results called to and read back by: Earl Georges RN. 04/28/2024  05:37  CUTIBACTERIUM ACNES* No Growth to date  No Growth to date     CBC:   Recent Labs   Lab 04/29/24 0947 04/30/24  0719   WBC 5.81 6.56   HGB 13.4* 12.0*   HCT 37.2* 34.5*    235     CMP:   Recent Labs   Lab 04/29/24 0947 04/30/24  0719    141   K 4.3 4.2    107   CO2 21* 26   GLU 93 110   BUN 36* 40*   CREATININE 1.5* 1.5*   CALCIUM 10.4 9.8   PROT 7.1 6.5   ALBUMIN 4.0 4.0   BILITOT 0.7 0.5   ALKPHOS 61 66   AST 18 22   ALT 38 46*   ANIONGAP 8 8     Microbiology Results (last 7 days)       Procedure Component Value Units Date/Time    Blood culture x two cultures. Draw prior to antibiotics. [[id number]] Collected: 04/29/24 0948    Order Status: Completed Specimen: Blood from Peripheral, Hand, Left Updated: 04/30/24 0115     Blood Culture, Routine No Growth to date    Narrative:      Aerobic and anaerobic    Blood culture x two cultures. Draw prior to antibiotics. [[id number]] Collected: 04/29/24 0948    Order Status: Completed Specimen: Blood from Peripheral, Hand, Right Updated: 04/30/24 0115     Blood Culture, Routine No Growth to date    Narrative:      Aerobic and anaerobic          All  pertinent labs within the past 24 hours have been reviewed.    Significant Imaging: I have reviewed all pertinent imaging results/findings within the past 24 hours.

## 2024-04-30 NOTE — ASSESSMENT & PLAN NOTE
Ventricular tachycardia arrest  -maintain potassium greater than 4  -continue LifeVest  -discussed with patient timing of ICD placement dependent upon if infection present.  Original plan was to have ICD placed on 04/30/2024.  Discussed with patient his wife that this appointment would need to be moved until the infection has been cleared.

## 2024-04-30 NOTE — ASSESSMENT & PLAN NOTE
-cultures from 4/23 became positive on 4/28  -repeat cultures obtained 4/29-no growth to date  -empiric antibiotics to cover gram positive rods initiated after blood cultures obtained  -cultures from 4/23 showed cutibacterium acnes  -discussed with patient treating with IV antibiotics for now as possible infection present  -ID consulted

## 2024-04-30 NOTE — SUBJECTIVE & OBJECTIVE
Past Medical History:   Diagnosis Date    Diverticulitis     Hyperlipidemia     Hypertension     On mechanically assisted ventilation 04/24/2024    PVC's (premature ventricular contractions)        Past Surgical History:   Procedure Laterality Date    CORONARY ANGIOGRAPHY N/A 4/24/2024    Procedure: ANGIOGRAM, CORONARY ARTERY;  Surgeon: Fitz Ochoa MD;  Location: [facility] CATH LAB;  Service: Cardiology;  Laterality: N/A;    LEFT HEART CATHETERIZATION Left 4/24/2024    Procedure: Left heart cath;  Surgeon: Fitz Ochoa MD;  Location: [facility] CATH LAB;  Service: Cardiology;  Laterality: Left;    VENTRICULOGRAM, LEFT  4/24/2024    Procedure: Ventriculogram, Left;  Surgeon: Fitz Ochoa MD;  Location: [facility] CATH LAB;  Service: Cardiology;;       Review of patient's allergies indicates:  No Known Allergies    Medications:  Current Facility-Administered Medications   Medication Dose Route Frequency Provider Last Rate Last Admin    aluminum-magnesium hydroxide-simethicone 200-200-20 mg/5 mL suspension 30 mL  30 mL Oral QID PRN Caron Mccall MD        aspirin chewable tablet 81 mg  81 mg Oral Daily Caron Mccall MD   81 mg at 04/30/24 0924    cefTRIAXone (ROCEPHIN) 2 g in dextrose 5 % in water (D5W) 100 mL IVPB (MB+)  2 g Intravenous Q24H Mir Randall DO        enoxaparin injection 40 mg  40 mg Subcutaneous Daily Caron Mccall MD   40 mg at 04/29/24 1735    glucagon (human recombinant) injection 1 mg  1 mg Intramuscular PRN Caron Mccall MD        glucose chewable tablet 16 g  16 g Oral PRN Caron Mccall MD        glucose chewable tablet 24 g  24 g Oral PRN Caron Mccall MD        melatonin tablet 6 mg  6 mg Oral Nightly PRN Caron Mccall MD        metoprolol succinate (TOPROL-XL) 24 hr tablet 25 mg  25 mg Oral Daily Caron Mccall MD   25 mg at 04/30/24 0924    naloxone 0.4 mg/mL injection 0.02 mg  0.02 mg Intravenous PRN Caron Mccall MD         pravastatin tablet 20 mg  20 mg Oral QHS Caron Mccall MD   20 mg at 04/29/24 2016    simethicone chewable tablet 80 mg  1 tablet Oral QID PRN Caron Mccall MD        sodium chloride 0.9% flush 10 mL  10 mL Intravenous Q12H PRN Caron Mccall MD         Antibiotics (From admission, onward)      Start     Stop Route Frequency Ordered    04/30/24 1500  cefTRIAXone (ROCEPHIN) 2 g in dextrose 5 % in water (D5W) 100 mL IVPB (MB+)         05/14/24 1459 IV Every 24 hours (non-standard times) 04/30/24 1239          Antifungals (From admission, onward)      None          Antivirals (From admission, onward)      None             There is no immunization history for the selected administration types on file for this patient.    Family History    None       Social History     Socioeconomic History    Marital status:    Tobacco Use    Smoking status: Never    Smokeless tobacco: Never     Social Determinants of Health     Financial Resource Strain: Low Risk  (4/25/2024)    Overall Financial Resource Strain (CARDIA)     Difficulty of Paying Living Expenses: Not hard at all   Food Insecurity: No Food Insecurity (4/25/2024)    Hunger Vital Sign     Worried About Running Out of Food in the Last Year: Never true     Ran Out of Food in the Last Year: Never true   Transportation Needs: No Transportation Needs (4/25/2024)    PRAPARE - Transportation     Lack of Transportation (Medical): No     Lack of Transportation (Non-Medical): No   Stress: No Stress Concern Present (4/25/2024)    [employer] - Occupational Stress Questionnaire     Feeling of Stress : Not at all   Housing Stability: Low Risk  (4/25/2024)    Housing Stability Vital Sign     Unable to Pay for Housing in the Last Year: No     Homeless in the Last Year: No     Review of Systems   Constitutional:  Positive for activity change.   Cardiovascular:         Wears Lifevest   All other systems reviewed and are  negative.    Objective:     Vital Signs (Most Recent):  Temp: 98.6 °F (37 °C) (04/30/24 1239)  Pulse: 81 (04/30/24 1239)  Resp: 20 (04/30/24 1239)  BP: 117/69 (04/30/24 1239)  SpO2: 99 % (04/30/24 1239) Vital Signs (24h Range):  Temp:  [97 °F (36.1 °C)-98.6 °F (37 °C)] 98.6 °F (37 °C)  Pulse:  [73-89] 81  Resp:  [16-20] 20  SpO2:  [98 %-100 %] 99 %  BP: (110-130)/(69-87) 117/69     Weight: 85.5 kg (188 lb 7.9 oz)  Body mass index is 27.84 kg/m².    Estimated Creatinine Clearance: 62.4 mL/min (A) (based on SCr of 1.5 mg/dL (H)).     Physical Exam  Constitutional:       General: He is not in acute distress.     Appearance: Normal appearance. He is not ill-appearing.   Cardiovascular:      Rate and Rhythm: Normal rate and regular rhythm.      Pulses: Normal pulses.      Heart sounds: Normal heart sounds. No murmur heard.     No friction rub. No gallop.      Comments: Wearing a Lifevest  Pulmonary:      Effort: Pulmonary effort is normal. No respiratory distress.      Breath sounds: Normal breath sounds.   Abdominal:      General: Abdomen is flat. Bowel sounds are normal. There is no distension.      Palpations: Abdomen is soft.      Tenderness: There is no abdominal tenderness.   Skin:     General: Skin is warm and dry.   Neurological:      Mental Status: He is alert.          Significant Labs: Blood Culture:   Recent Labs   Lab 04/23/24 2027 04/23/24 2028 04/29/24  0948   LABBLOO Gram stain shelley bottle: Gram positive rods  Positive results previously called 04/28/2024  23:17 Gram stain shelley bottle: Gram positive rods  Results called to and read back by: Earl Georges RN. 04/28/2024  05:37  CUTIBACTERIUM ACNES* No Growth to date  No Growth to date     CBC:   Recent Labs   Lab 04/29/24  0947 04/30/24  0719   WBC 5.81 6.56   HGB 13.4* 12.0*   HCT 37.2* 34.5*    235     CMP:   Recent Labs   Lab 04/29/24  0947 04/30/24  0719    141   K 4.3 4.2    107   CO2 21* 26   GLU 93 110   BUN 36* 40*    CREATININE 1.5* 1.5*   CALCIUM 10.4 9.8   PROT 7.1 6.5   ALBUMIN 4.0 4.0   BILITOT 0.7 0.5   ALKPHOS 61 66   AST 18 22   ALT 38 46*   ANIONGAP 8 8     Microbiology Results (last 7 days)       Procedure Component Value Units Date/Time    Blood culture x two cultures. Draw prior to antibiotics. [[id number]] Collected: 04/29/24 0948    Order Status: Completed Specimen: Blood from Peripheral, Hand, Left Updated: 04/30/24 0115     Blood Culture, Routine No Growth to date    Narrative:      Aerobic and anaerobic    Blood culture x two cultures. Draw prior to antibiotics. [[id number]] Collected: 04/29/24 0948    Order Status: Completed Specimen: Blood from Peripheral, Hand, Right Updated: 04/30/24 0115     Blood Culture, Routine No Growth to date    Narrative:      Aerobic and anaerobic          All pertinent labs within the past 24 hours have been reviewed.    Significant Imaging: I have reviewed all pertinent imaging results/findings within the past 24 hours.

## 2024-04-30 NOTE — PROGRESS NOTES
[facility]  [facility] Medicine  Progress Note    Patient Name: Milind Melgar  MRN: [id number]  Patient Class: IP- Inpatient   Admission Date: 4/29/2024  Length of Stay: 0 days  Attending Physician: Neil Rodriguez MD  Primary Care Provider: ANNE Ford Jr., NP        Subjective:     Principal Problem:Positive blood culture        HPI:  The patient is 54 yo male with past medical history of hypertension, ventricular tachycardia and recent cardiac arrest who presented to the ED after being called for positive blood cultures. He had blood cultures obtained 4/23/24. The cultures isolated gram positive rods 4/28/24. He reports he has been feeling fine and was scheduled to have ICD placement 4/30. He has LifeVest on. Repeat blood cultures obtained in the ED. Hospital medicine consulted. Empiric IV antibiotics initiated    Overview/Hospital Course:  Mr. Melgar admitted with Gram-positive rods in the blood on 04/28/2024, identification and sensitivity was pending at the time of admission.  On 04/30/2024, it is noted that the patient is positive for cutibacterium acnes .  Patient was to have an ICD placed today, but procedure will be delayed due to bacteremia.  He has been started on Rocephin and repeat cultures were drawn on 04/29/2024.  ID has been consulted for further recommendations regarding antibiotics and duration of treatment.      Interval History:  Follow up for positive blood cultures.  Patient in bed with wife bedside resting comfortably.  Blood cultures done on the 28th reviewed.  Repeat blood cultures pending.  ID consulted and awaiting recommendations.    Review of Systems  Objective:     Vital Signs (Most Recent):  Temp: 98.6 °F (37 °C) (04/30/24 1239)  Pulse: 81 (04/30/24 1239)  Resp: 20 (04/30/24 1239)  BP: 117/69 (04/30/24 1239)  SpO2: 99 % (04/30/24 1239) Vital Signs (24h Range):  Temp:  [97 °F (36.1 °C)-98.6 °F (37 °C)] 98.6 °F (37 °C)  Pulse:  [73-89] 81  Resp:  [16-20] 20  SpO2:  [98  %-100 %] 99 %  BP: (110-130)/(69-87) 117/69     Weight: 85.5 kg (188 lb 7.9 oz)  Body mass index is 27.84 kg/m².    Intake/Output Summary (Last 24 hours) at 4/30/2024 1348  Last data filed at 4/29/2024 1405  Gross per 24 hour   Intake 100 ml   Output --   Net 100 ml         Physical Exam  Vitals and nursing note reviewed.   Constitutional:       Appearance: Normal appearance.   HENT:      Head: Normocephalic and atraumatic.      Nose: Nose normal.      Mouth/Throat:      Mouth: Mucous membranes are moist.   Eyes:      Extraocular Movements: Extraocular movements intact.      Conjunctiva/sclera: Conjunctivae normal.   Cardiovascular:      Rate and Rhythm: Normal rate and regular rhythm.      Pulses: Normal pulses.      Heart sounds: Normal heart sounds.   Pulmonary:      Effort: Pulmonary effort is normal.      Breath sounds: Normal breath sounds.   Abdominal:      General: Abdomen is flat. Bowel sounds are normal.      Palpations: Abdomen is soft.   Musculoskeletal:         General: Normal range of motion.      Cervical back: Normal range of motion and neck supple.   Skin:     General: Skin is warm.      Capillary Refill: Capillary refill takes less than 2 seconds.   Neurological:      Mental Status: He is alert and oriented to person, place, and time. Mental status is at baseline.   Psychiatric:         Mood and Affect: Mood normal.         Behavior: Behavior normal.             Significant Labs: All pertinent labs within the past 24 hours have been reviewed.  Recent Lab Results         04/30/24  0719        Albumin 4.0       ALP 66       ALT 46       Anion Gap 8       AST 22       Baso # 0.05       Basophil % 0.8       BILIRUBIN TOTAL 0.5  Comment: For infants and newborns, interpretation of results should be based  on gestational age, weight and in agreement with clinical  observations.    Premature Infant recommended reference ranges:  Up to 24 hours.............<8.0 mg/dL  Up to 48 hours............<12.0  mg/dL  3-5 days..................<15.0 mg/dL  6-29 days.................<15.0 mg/dL         BUN 40       Calcium 9.8       Chloride 107       CO2 26       Creatinine 1.5       Differential Method Automated       eGFR 56       Eos # 0.0       Eos % 0.5       Glucose 110       Gran # (ANC) 4.4       Gran % 67.5       Hematocrit 34.5       Hemoglobin 12.0       Immature Grans (Abs) 0.03  Comment: Mild elevation in immature granulocytes is non specific and   can be seen in a variety of conditions including stress response,   acute inflammation, trauma and pregnancy. Correlation with other   laboratory and clinical findings is essential.         Immature Granulocytes 0.5       Lymph # 1.3       Lymph % 20.3       Magnesium  2.1       MCH 31.7       MCHC 34.8       MCV 91       Mono # 0.7       Mono % 10.4       MPV 10.3       nRBC 0       Platelet Count 235       Potassium 4.2       PROTEIN TOTAL 6.5       RBC 3.78       RDW 12.0       Sodium 141       WBC 6.56               Significant Imaging:   No orders to display        Assessment/Plan:      * Positive blood culture  -cultures from 4/23 became positive on 4/28  -repeat cultures obtained 4/29-no growth to date  -empiric antibiotics to cover gram positive rods initiated after blood cultures obtained  -cultures from 4/23 showed cutibacterium acnes  -discussed with patient treating with IV antibiotics for now as possible infection present  -ID consulted    Hypertension  Chronic, controlled. Latest blood pressure and vitals reviewed-     Temp:  [97 °F (36.1 °C)-98.6 °F (37 °C)]   Pulse:  [73-89]   Resp:  [16-20]   BP: (110-130)/(69-87)   SpO2:  [98 %-100 %] .   Home meds for hypertension were reviewed and noted below.   Hypertension Medications               metoprolol succinate (TOPROL-XL) 25 MG 24 hr tablet Take 1 tablet (25 mg total) by mouth once daily.            While in the hospital, will manage blood pressure as follows; Continue home antihypertensive  regimen      Ventricular fibrillation  Ventricular tachycardia arrest  -maintain potassium greater than 4  -continue LifeVest  -discussed with patient timing of ICD placement dependent upon if infection present.  Original plan was to have ICD placed on 04/30/2024.  Discussed with patient his wife that this appointment would need to be moved until the infection has been cleared.          VTE Risk Mitigation (From admission, onward)           Ordered     enoxaparin injection 40 mg  Daily         04/29/24 1251     IP VTE HIGH RISK PATIENT  Once         04/29/24 1251     Place sequential compression device  Until discontinued         04/29/24 1251                    Discharge Planning   DAVID:      Code Status: Full Code   Is the patient medically ready for discharge?:     Reason for patient still in hospital (select all that apply): Patient trending condition, Laboratory test, Treatment, and Consult recommendations                     Neil Rodriguez MD  Department of Hospital Medicine   [facility]

## 2024-04-30 NOTE — HOSPITAL COURSE
Mr. Melgar admitted with Gram-positive rods in the blood on 04/28/2024, identification and sensitivity was pending at the time of admission.  On 04/30/2024, it is noted that the patient is positive for cutibacterium acnes .  Patient was to have an ICD placed today, but procedure will be delayed due to bacteremia.  He has been started on Rocephin and repeat cultures were drawn on 04/29/2024.  ID has been consulted for further recommendations regarding antibiotics and duration of treatment.  Dr. Randall reviewed patient's chart and evaluated the patient.  He recommended 14 days of Rocephin.  Upon completion of his antibiotics patient will be cleared for ICD placement with Cardiology.  Dr. Randall was to reach out to Cardiology to let them know of the clearance.  Patient denies any chest pain or shortness a breath on day of discharge.  He had a PICC line placed to the left arm on 05/01/2024 and has been set up with home infusion as well as [facility].  Patient was stable for discharge

## 2024-04-30 NOTE — ASSESSMENT & PLAN NOTE
Chronic, controlled. Latest blood pressure and vitals reviewed-     Temp:  [97 °F (36.1 °C)-98.6 °F (37 °C)]   Pulse:  [73-89]   Resp:  [16-20]   BP: (110-130)/(69-87)   SpO2:  [98 %-100 %] .   Home meds for hypertension were reviewed and noted below.   Hypertension Medications               metoprolol succinate (TOPROL-XL) 25 MG 24 hr tablet Take 1 tablet (25 mg total) by mouth once daily.            While in the hospital, will manage blood pressure as follows; Continue home antihypertensive regimen

## 2024-04-30 NOTE — ASSESSMENT & PLAN NOTE
--Two bottles have grown Cutibacterium acnes   --Micro lab unable to run susceptibilities   --Typically S to penicillin and cephalosporins   --Source unclear   --Common bacterium of oral and GI/ tract   --Due to unclear source and plans for ICD placement, recommend 14 day course of IV ceftriaxone 2 g daily   --Follow repeat blood cx 4/29 for clearance   --After final day of IV antibiotic patient clear for ICD placement from ID standpoint  --Will schedule ID clinic follow up  --Can place PICC  --Above d/w primary team.      Outpatient Antibiotic Therapy Plan:    1) Infection: Cutibacterium bacteremia     2) Discharge Antibiotics:    Intravenous antibiotics:  IV ceftriaxone 2 g daily     3) Therapy Duration:  14 days from negative blood cx     Estimated end date of IV antibiotics: 5/13/24    4) Outpatient Weekly Labs:    Order the following labs to be drawn on Mondays:   CBC  CMP     5) Fax Lab Results to Infectious Diseases Provider: Dr. [facility] Fax Number: [phone]

## 2024-04-30 NOTE — PLAN OF CARE
Needs home IV antibiotics.  Referral sent to [facility]       04/30/24 1118   Post-Acute Status   Post-Acute Authorization IV Infusion   IV Infusion Status Referral(s) sent   Discharge Plan   Discharge Plan A [facility]

## 2024-04-30 NOTE — ASSESSMENT & PLAN NOTE
Planning for ICD placement; recommend waiting until antibiotic course is complete; see bacteremia

## 2024-04-30 NOTE — SUBJECTIVE & OBJECTIVE
Interval History:  Follow up for positive blood cultures.  Patient in bed with wife bedside resting comfortably.  Blood cultures done on the 28th reviewed.  Repeat blood cultures pending.  ID consulted and awaiting recommendations.    Review of Systems  Objective:     Vital Signs (Most Recent):  Temp: 98.6 °F (37 °C) (04/30/24 1239)  Pulse: 81 (04/30/24 1239)  Resp: 20 (04/30/24 1239)  BP: 117/69 (04/30/24 1239)  SpO2: 99 % (04/30/24 1239) Vital Signs (24h Range):  Temp:  [97 °F (36.1 °C)-98.6 °F (37 °C)] 98.6 °F (37 °C)  Pulse:  [73-89] 81  Resp:  [16-20] 20  SpO2:  [98 %-100 %] 99 %  BP: (110-130)/(69-87) 117/69     Weight: 85.5 kg (188 lb 7.9 oz)  Body mass index is 27.84 kg/m².    Intake/Output Summary (Last 24 hours) at 4/30/2024 1348  Last data filed at 4/29/2024 1405  Gross per 24 hour   Intake 100 ml   Output --   Net 100 ml         Physical Exam  Vitals and nursing note reviewed.   Constitutional:       Appearance: Normal appearance.   HENT:      Head: Normocephalic and atraumatic.      Nose: Nose normal.      Mouth/Throat:      Mouth: Mucous membranes are moist.   Eyes:      Extraocular Movements: Extraocular movements intact.      Conjunctiva/sclera: Conjunctivae normal.   Cardiovascular:      Rate and Rhythm: Normal rate and regular rhythm.      Pulses: Normal pulses.      Heart sounds: Normal heart sounds.   Pulmonary:      Effort: Pulmonary effort is normal.      Breath sounds: Normal breath sounds.   Abdominal:      General: Abdomen is flat. Bowel sounds are normal.      Palpations: Abdomen is soft.   Musculoskeletal:         General: Normal range of motion.      Cervical back: Normal range of motion and neck supple.   Skin:     General: Skin is warm.      Capillary Refill: Capillary refill takes less than 2 seconds.   Neurological:      Mental Status: He is alert and oriented to person, place, and time. Mental status is at baseline.   Psychiatric:         Mood and Affect: Mood normal.          Behavior: Behavior normal.             Significant Labs: All pertinent labs within the past 24 hours have been reviewed.  Recent Lab Results         04/30/24  0719        Albumin 4.0       ALP 66       ALT 46       Anion Gap 8       AST 22       Baso # 0.05       Basophil % 0.8       BILIRUBIN TOTAL 0.5  Comment: For infants and newborns, interpretation of results should be based  on gestational age, weight and in agreement with clinical  observations.    Premature Infant recommended reference ranges:  Up to 24 hours.............<8.0 mg/dL  Up to 48 hours............<12.0 mg/dL  3-5 days..................<15.0 mg/dL  6-29 days.................<15.0 mg/dL         BUN 40       Calcium 9.8       Chloride 107       CO2 26       Creatinine 1.5       Differential Method Automated       eGFR 56       Eos # 0.0       Eos % 0.5       Glucose 110       Gran # (ANC) 4.4       Gran % 67.5       Hematocrit 34.5       Hemoglobin 12.0       Immature Grans (Abs) 0.03  Comment: Mild elevation in immature granulocytes is non specific and   can be seen in a variety of conditions including stress response,   acute inflammation, trauma and pregnancy. Correlation with other   laboratory and clinical findings is essential.         Immature Granulocytes 0.5       Lymph # 1.3       Lymph % 20.3       Magnesium  2.1       MCH 31.7       MCHC 34.8       MCV 91       Mono # 0.7       Mono % 10.4       MPV 10.3       nRBC 0       Platelet Count 235       Potassium 4.2       PROTEIN TOTAL 6.5       RBC 3.78       RDW 12.0       Sodium 141       WBC 6.56               Significant Imaging:   No orders to display

## 2024-05-01 ENCOUNTER — HOSPITAL ENCOUNTER (OUTPATIENT)
Facility: HOSPITAL | Age: 54
Discharge: HOME | End: 2024-05-01
Attending: FAMILY MEDICINE | Admitting: HOSPITALIST
Payer: COMMERCIAL

## 2024-05-01 VITALS
TEMPERATURE: 98 F | SYSTOLIC BLOOD PRESSURE: 110 MMHG | HEART RATE: 75 BPM | WEIGHT: 188.5 LBS | DIASTOLIC BLOOD PRESSURE: 62 MMHG | BODY MASS INDEX: 27.84 KG/M2 | RESPIRATION RATE: 19 BRPM | OXYGEN SATURATION: 99 %

## 2024-05-01 VITALS
TEMPERATURE: 99 F | BODY MASS INDEX: 27.74 KG/M2 | OXYGEN SATURATION: 100 % | WEIGHT: 187.81 LBS | DIASTOLIC BLOOD PRESSURE: 71 MMHG | RESPIRATION RATE: 19 BRPM | HEART RATE: 72 BPM | SYSTOLIC BLOOD PRESSURE: 121 MMHG

## 2024-05-01 DIAGNOSIS — R42 DIZZINESS: ICD-10-CM

## 2024-05-01 DIAGNOSIS — K92.1 COMPLAINT OF MELENA: ICD-10-CM

## 2024-05-01 DIAGNOSIS — K92.1 MELENA: Primary | ICD-10-CM

## 2024-05-01 DIAGNOSIS — R78.81 POSITIVE BLOOD CULTURE: ICD-10-CM

## 2024-05-01 DIAGNOSIS — K92.2 UPPER GI BLEED: ICD-10-CM

## 2024-05-01 DIAGNOSIS — D62 ACUTE POST-HEMORRHAGIC ANEMIA: ICD-10-CM

## 2024-05-01 PROBLEM — R53.1 WEAKNESS: Status: ACTIVE | Noted: 2024-05-01

## 2024-05-01 PROBLEM — Z86.74 HISTORY OF CARDIAC ARREST: Status: ACTIVE | Noted: 2024-04-24

## 2024-05-01 LAB
ALBUMIN SERPL BCP-MCNC: 3.5 G/DL (ref 3.5–5.2)
ALBUMIN SERPL BCP-MCNC: 4 G/DL (ref 3.5–5.2)
ALP SERPL-CCNC: 52 U/L (ref 55–135)
ALP SERPL-CCNC: 60 U/L (ref 55–135)
ALT SERPL W/O P-5'-P-CCNC: 33 U/L (ref 10–44)
ALT SERPL W/O P-5'-P-CCNC: 35 U/L (ref 10–44)
ANION GAP SERPL CALC-SCNC: 3 MMOL/L (ref 8–16)
ANION GAP SERPL CALC-SCNC: 8 MMOL/L (ref 8–16)
AST SERPL-CCNC: 16 U/L (ref 10–40)
AST SERPL-CCNC: 18 U/L (ref 10–40)
BACTERIA BLD CULT: ABNORMAL
BASOPHILS # BLD AUTO: 0.03 K/UL (ref 0–0.2)
BASOPHILS # BLD AUTO: 0.05 K/UL (ref 0–0.2)
BASOPHILS NFR BLD: 0.6 % (ref 0–1.9)
BASOPHILS NFR BLD: 0.6 % (ref 0–1.9)
BILIRUB SERPL-MCNC: 0.3 MG/DL (ref 0.1–1)
BILIRUB SERPL-MCNC: 0.4 MG/DL (ref 0.1–1)
BILIRUB UR QL STRIP: NEGATIVE
BNP SERPL-MCNC: <10 PG/ML (ref 0–99)
BUN SERPL-MCNC: 27 MG/DL (ref 6–20)
BUN SERPL-MCNC: 35 MG/DL (ref 6–20)
CALCIUM SERPL-MCNC: 9.7 MG/DL (ref 8.7–10.5)
CALCIUM SERPL-MCNC: 9.8 MG/DL (ref 8.7–10.5)
CHLORIDE SERPL-SCNC: 106 MMOL/L (ref 95–110)
CHLORIDE SERPL-SCNC: 108 MMOL/L (ref 95–110)
CK SERPL-CCNC: 62 U/L (ref 20–200)
CLARITY UR: CLEAR
CO2 SERPL-SCNC: 23 MMOL/L (ref 23–29)
CO2 SERPL-SCNC: 27 MMOL/L (ref 23–29)
COLOR UR: COLORLESS
CREAT SERPL-MCNC: 1.4 MG/DL (ref 0.5–1.4)
CREAT SERPL-MCNC: 1.4 MG/DL (ref 0.5–1.4)
DIFFERENTIAL METHOD BLD: ABNORMAL
DIFFERENTIAL METHOD BLD: ABNORMAL
EOSINOPHIL # BLD AUTO: 0 K/UL (ref 0–0.5)
EOSINOPHIL # BLD AUTO: 0 K/UL (ref 0–0.5)
EOSINOPHIL NFR BLD: 0.3 % (ref 0–8)
EOSINOPHIL NFR BLD: 0.7 % (ref 0–8)
ERYTHROCYTE [DISTWIDTH] IN BLOOD BY AUTOMATED COUNT: 12.1 % (ref 11.5–14.5)
ERYTHROCYTE [DISTWIDTH] IN BLOOD BY AUTOMATED COUNT: 12.3 % (ref 11.5–14.5)
EST. GFR  (NO RACE VARIABLE): >60 ML/MIN/1.73 M^2
EST. GFR  (NO RACE VARIABLE): >60 ML/MIN/1.73 M^2
GLUCOSE SERPL-MCNC: 102 MG/DL (ref 70–110)
GLUCOSE SERPL-MCNC: 108 MG/DL (ref 70–110)
GLUCOSE UR QL STRIP: NEGATIVE
HCT VFR BLD AUTO: 27.5 % (ref 40–54)
HCT VFR BLD AUTO: 29.4 % (ref 40–54)
HGB BLD-MCNC: 10.3 G/DL (ref 14–18)
HGB BLD-MCNC: 9.9 G/DL (ref 14–18)
HGB UR QL STRIP: NEGATIVE
IMM GRANULOCYTES # BLD AUTO: 0.04 K/UL (ref 0–0.04)
IMM GRANULOCYTES # BLD AUTO: 0.05 K/UL (ref 0–0.04)
IMM GRANULOCYTES NFR BLD AUTO: 0.6 % (ref 0–0.5)
IMM GRANULOCYTES NFR BLD AUTO: 0.7 % (ref 0–0.5)
KETONES UR QL STRIP: NEGATIVE
LEUKOCYTE ESTERASE UR QL STRIP: NEGATIVE
LYMPHOCYTES # BLD AUTO: 1.4 K/UL (ref 1–4.8)
LYMPHOCYTES # BLD AUTO: 1.6 K/UL (ref 1–4.8)
LYMPHOCYTES NFR BLD: 18.2 % (ref 18–48)
LYMPHOCYTES NFR BLD: 26.5 % (ref 18–48)
MAGNESIUM SERPL-MCNC: 2 MG/DL (ref 1.6–2.6)
MCH RBC QN AUTO: 32.1 PG (ref 27–31)
MCH RBC QN AUTO: 32.1 PG (ref 27–31)
MCHC RBC AUTO-ENTMCNC: 35 G/DL (ref 32–36)
MCHC RBC AUTO-ENTMCNC: 36 G/DL (ref 32–36)
MCV RBC AUTO: 89 FL (ref 82–98)
MCV RBC AUTO: 92 FL (ref 82–98)
MONOCYTES # BLD AUTO: 0.6 K/UL (ref 0.3–1)
MONOCYTES # BLD AUTO: 0.7 K/UL (ref 0.3–1)
MONOCYTES NFR BLD: 11.2 % (ref 4–15)
MONOCYTES NFR BLD: 8.1 % (ref 4–15)
NEUTROPHILS # BLD AUTO: 3.3 K/UL (ref 1.8–7.7)
NEUTROPHILS # BLD AUTO: 6.3 K/UL (ref 1.8–7.7)
NEUTROPHILS NFR BLD: 60.3 % (ref 38–73)
NEUTROPHILS NFR BLD: 72.2 % (ref 38–73)
NITRITE UR QL STRIP: NEGATIVE
NRBC BLD-RTO: 0 /100 WBC
NRBC BLD-RTO: 0 /100 WBC
PH UR STRIP: 6 [PH] (ref 5–8)
PLATELET # BLD AUTO: 226 K/UL (ref 150–450)
PLATELET # BLD AUTO: 239 K/UL (ref 150–450)
PMV BLD AUTO: 10 FL (ref 9.2–12.9)
PMV BLD AUTO: 10.3 FL (ref 9.2–12.9)
POTASSIUM SERPL-SCNC: 3.7 MMOL/L (ref 3.5–5.1)
POTASSIUM SERPL-SCNC: 4.3 MMOL/L (ref 3.5–5.1)
PROT SERPL-MCNC: 6 G/DL (ref 6–8.4)
PROT SERPL-MCNC: 6.6 G/DL (ref 6–8.4)
PROT UR QL STRIP: NEGATIVE
RBC # BLD AUTO: 3.08 M/UL (ref 4.6–6.2)
RBC # BLD AUTO: 3.21 M/UL (ref 4.6–6.2)
SODIUM SERPL-SCNC: 137 MMOL/L (ref 136–145)
SODIUM SERPL-SCNC: 138 MMOL/L (ref 136–145)
SP GR UR STRIP: 1.01 (ref 1–1.03)
TROPONIN I SERPL DL<=0.01 NG/ML-MCNC: 0.01 NG/ML (ref 0–0.03)
URN SPEC COLLECT METH UR: ABNORMAL
UROBILINOGEN UR STRIP-ACNC: NEGATIVE EU/DL
WBC # BLD AUTO: 5.43 K/UL (ref 3.9–12.7)
WBC # BLD AUTO: 8.74 K/UL (ref 3.9–12.7)

## 2024-05-01 PROCEDURE — 82550 ASSAY OF CK (CPK): CPT | Performed by: NURSE PRACTITIONER

## 2024-05-01 PROCEDURE — 99285 EMERGENCY DEPT VISIT HI MDM: CPT | Mod: 25,27

## 2024-05-01 PROCEDURE — 80053 COMPREHEN METABOLIC PANEL: CPT | Mod: 91 | Performed by: NURSE PRACTITIONER

## 2024-05-01 PROCEDURE — 81003 URINALYSIS AUTO W/O SCOPE: CPT | Performed by: NURSE PRACTITIONER

## 2024-05-01 PROCEDURE — 36415 COLL VENOUS BLD VENIPUNCTURE: CPT | Performed by: INTERNAL MEDICINE

## 2024-05-01 PROCEDURE — 36569 INSJ PICC 5 YR+ W/O IMAGING: CPT

## 2024-05-01 PROCEDURE — 80053 COMPREHEN METABOLIC PANEL: CPT | Performed by: INTERNAL MEDICINE

## 2024-05-01 PROCEDURE — 93010 ELECTROCARDIOGRAM REPORT: CPT | Mod: ,,, | Performed by: INTERNAL MEDICINE

## 2024-05-01 PROCEDURE — 85025 COMPLETE CBC W/AUTO DIFF WBC: CPT | Performed by: INTERNAL MEDICINE

## 2024-05-01 PROCEDURE — C1751 CATH, INF, PER/CENT/MIDLINE: HCPCS

## 2024-05-01 PROCEDURE — G0378 HOSPITAL OBSERVATION PER HR: HCPCS

## 2024-05-01 PROCEDURE — 63600175 PHARM REV CODE 636 W HCPCS: Performed by: FAMILY MEDICINE

## 2024-05-01 PROCEDURE — 83735 ASSAY OF MAGNESIUM: CPT | Performed by: INTERNAL MEDICINE

## 2024-05-01 PROCEDURE — 85025 COMPLETE CBC W/AUTO DIFF WBC: CPT | Mod: 91 | Performed by: NURSE PRACTITIONER

## 2024-05-01 PROCEDURE — 96372 THER/PROPH/DIAG INJ SC/IM: CPT | Performed by: FAMILY MEDICINE

## 2024-05-01 PROCEDURE — C9113 INJ PANTOPRAZOLE SODIUM, VIA: HCPCS | Performed by: FAMILY MEDICINE

## 2024-05-01 PROCEDURE — 84484 ASSAY OF TROPONIN QUANT: CPT | Performed by: NURSE PRACTITIONER

## 2024-05-01 PROCEDURE — 02HV33Z INSERTION OF INFUSION DEVICE INTO SUPERIOR VENA CAVA, PERCUTANEOUS APPROACH: ICD-10-PCS | Performed by: FAMILY MEDICINE

## 2024-05-01 PROCEDURE — 83880 ASSAY OF NATRIURETIC PEPTIDE: CPT | Performed by: NURSE PRACTITIONER

## 2024-05-01 PROCEDURE — 25000003 PHARM REV CODE 250: Performed by: INTERNAL MEDICINE

## 2024-05-01 PROCEDURE — 96374 THER/PROPH/DIAG INJ IV PUSH: CPT

## 2024-05-01 PROCEDURE — 99204 OFFICE O/P NEW MOD 45 MIN: CPT | Mod: ,,, | Performed by: INTERNAL MEDICINE

## 2024-05-01 PROCEDURE — 93005 ELECTROCARDIOGRAM TRACING: CPT

## 2024-05-01 RX ORDER — SODIUM CHLORIDE 0.9 % (FLUSH) 0.9 %
10 SYRINGE (ML) INJECTION
Status: CANCELLED | OUTPATIENT
Start: 2024-05-01

## 2024-05-01 RX ORDER — PANTOPRAZOLE SODIUM 40 MG/10ML
40 INJECTION, POWDER, LYOPHILIZED, FOR SOLUTION INTRAVENOUS
Status: COMPLETED | OUTPATIENT
Start: 2024-05-01 | End: 2024-05-01

## 2024-05-01 RX ORDER — AMPICILLIN AND SULBACTAM 2; 1 G/1; G/1
3 INJECTION, POWDER, FOR SOLUTION INTRAMUSCULAR; INTRAVENOUS
Status: COMPLETED | OUTPATIENT
Start: 2024-05-01 | End: 2024-05-01

## 2024-05-01 RX ORDER — SODIUM CHLORIDE, SODIUM LACTATE, POTASSIUM CHLORIDE, CALCIUM CHLORIDE 600; 310; 30; 20 MG/100ML; MG/100ML; MG/100ML; MG/100ML
INJECTION, SOLUTION INTRAVENOUS CONTINUOUS
Status: CANCELLED | OUTPATIENT
Start: 2024-05-01

## 2024-05-01 RX ADMIN — PANTOPRAZOLE SODIUM 40 MG: 40 INJECTION, POWDER, LYOPHILIZED, FOR SOLUTION INTRAVENOUS at 07:05

## 2024-05-01 RX ADMIN — AMPICILLIN AND SULBACTAM 3 G: 2; 1 INJECTION, POWDER, FOR SOLUTION INTRAVENOUS at 09:05

## 2024-05-01 RX ADMIN — ASPIRIN 81 MG CHEWABLE TABLET 81 MG: 81 TABLET CHEWABLE at 08:05

## 2024-05-01 RX ADMIN — METOPROLOL SUCCINATE 25 MG: 25 TABLET, FILM COATED, EXTENDED RELEASE ORAL at 08:05

## 2024-05-01 NOTE — HPI
Milind was admitted last week after cardiac arrest at home, witnessed by his wife and daughter, who started CPR. Apparently he did not have a pulse for a couple of minutes and when EMS arrived he was intubated. He had an extensive work up and also went home with a PICC line to treat an infection. He called me today feeling lightheaded and reported passing one episode of black tarry stools. He also reports epigastric discomfort, increased belching and indigestion.     A CBC drawn today:  Lab Results   Component Value Date    WBC 5.43 05/01/2024    HGB 10.3 (L) 05/01/2024    HCT 29.4 (L) 05/01/2024    MCV 92 05/01/2024     05/01/2024     I advised him to come to the ED for work up and admit.   Recent Results (from the past 336 hour(s))   CBC with Automated Differential    Collection Time: 05/01/24  5:43 AM   Result Value Ref Range    WBC 5.43 3.90 - 12.70 K/uL    Hemoglobin 10.3 (L) 14.0 - 18.0 g/dL    Hematocrit 29.4 (L) 40.0 - 54.0 %    Platelets 226 150 - 450 K/uL   CBC with Automated Differential    Collection Time: 04/30/24  7:19 AM   Result Value Ref Range    WBC 6.56 3.90 - 12.70 K/uL    Hemoglobin 12.0 (L) 14.0 - 18.0 g/dL    Hematocrit 34.5 (L) 40.0 - 54.0 %    Platelets 235 150 - 450 K/uL   CBC auto differential    Collection Time: 04/29/24  9:47 AM   Result Value Ref Range    WBC 5.81 3.90 - 12.70 K/uL    Hemoglobin 13.4 (L) 14.0 - 18.0 g/dL    Hematocrit 37.2 (L) 40.0 - 54.0 %    Platelets 212 150 - 450 K/uL     There has been a drop in his CBC over a week.

## 2024-05-01 NOTE — FIRST PROVIDER EVALUATION
Medical screening examination initiated.  I have conducted a focused provider triage encounter, findings are as follows:    Brief history of present illness:  Patient presents with dizziness recent admission with cardiac arrest.    Vitals:    05/01/24 1827 05/01/24 1829 05/01/24 1831   BP:  129/75    BP Location:  Right arm    Patient Position:  Sitting    Pulse:  83    Resp:  18    Temp:   98.5 °F (36.9 °C)   TempSrc:  Oral Oral   SpO2:  100%    Weight: 85.2 kg (187 lb 13.3 oz)         Pertinent physical exam:  NAD in triage.    Brief workup plan:  Labs ordered, patient is arrival to the ED forwarded to the flow position to get patient bed asap.    Preliminary workup initiated; this workup will be continued and followed by the physician or advanced practice provider that is assigned to the patient when roomed.

## 2024-05-01 NOTE — PLAN OF CARE
[facility]  Discharge Final Note    Primary Care Provider: ANNE Ford Jr., NP    Expected Discharge Date: 5/1/2024    Final Discharge Note (most recent)       Final Note - 05/01/24 1103          Final Note    Assessment Type Final Discharge Note (P)      Anticipated Discharge Disposition Home-Health Care Svc (P)         Post-Acute Status    Post-Acute Authorization Home Health;IV Infusion (P)      Home Health Status Set-up Complete/Auth obtained (P)    [facility]    IV Infusion Status Set-up Complete/Auth obtained (P)                      Important Message from Medicare             Contact Info       Pako Main MD   Specialty: Electrophysiology, Cardiology    [address]  [place] [address]   Phone: [phone]       Next Steps: Follow up in 1 week(s)    Mir Randall DO   Specialty: Infectious Diseases    [address] [facility] [address]   Phone: [phone]       Next Steps: Follow up    Instructions: As needed    [facility]   Specialty: Home Health Services    [address] [facility] [address]   Phone: [phone]       Next Steps: Follow up    Instructions: Home Health: Agency will call and schedule an appointment to visit.

## 2024-05-01 NOTE — PLAN OF CARE
[facility] completed teaching.    [facility] accepted patient.       05/01/24 1102   Post-Acute Status   Post-Acute Authorization Home Health;IV Infusion   Home Health Status Set-up Complete/Auth obtained   IV Infusion Status Set-up Complete/Auth obtained   Discharge Plan   Discharge Plan A [facility]

## 2024-05-01 NOTE — DISCHARGE SUMMARY
[facility]  Hospital Medicine  Discharge Summary      Patient Name: Milind Melgar  MRN: [id number]  [facility]: [id number]  Patient Class: IP- Inpatient  Admission Date: 4/29/2024  Hospital Length of Stay: 1 days  Discharge Date and Time: 5/1/2024 12:44 PM  Attending Physician: No att. providers found   Discharging Provider: Neil Rodriguez MD  Primary Care Provider: ANNE Ford Jr., NP    Primary Care Team: [facility] MEDICINE     HPI:   The patient is 54 yo male with past medical history of hypertension, ventricular tachycardia and recent cardiac arrest who presented to the ED after being called for positive blood cultures. He had blood cultures obtained 4/23/24. The cultures isolated gram positive rods 4/28/24. He reports he has been feeling fine and was scheduled to have ICD placement 4/30. He has LifeVest on. Repeat blood cultures obtained in the ED. Hospital medicine consulted. Empiric IV antibiotics initiated    * No surgery found *      Hospital Course:   Mr. Melgar admitted with Gram-positive rods in the blood on 04/28/2024, identification and sensitivity was pending at the time of admission.  On 04/30/2024, it is noted that the patient is positive for cutibacterium acnes .  Patient was to have an ICD placed today, but procedure will be delayed due to bacteremia.  He has been started on Rocephin and repeat cultures were drawn on 04/29/2024.  ID has been consulted for further recommendations regarding antibiotics and duration of treatment.  Dr. Randall reviewed patient's chart and evaluated the patient.  He recommended 14 days of Rocephin.  Upon completion of his antibiotics patient will be cleared for ICD placement with Cardiology.  Dr. Randall was to reach out to Cardiology to let them know of the clearance.  Patient denies any chest pain or shortness a breath on day of discharge.  He had a PICC line placed to the left arm on 05/01/2024 and has been [facility] with [facility].  Patient was stable for discharge     Goals of Care Treatment Preferences:  Code Status: Full Code      Consults:   Consults (From admission, onward)          Status Ordering Provider     Inpatient consult to Social Work  Once        Provider:  (Not yet assigned)    Completed SUSANNE TREJO     Inpatient consult to Infectious Diseases  Once        Provider:  Mir Randall DO    Completed SUSANNE TREJO            No new Assessment & Plan notes have been filed under this hospital service since the last note was generated.  Service: Hospital Medicine    Final Active Diagnoses:    Diagnosis Date Noted POA    PRINCIPAL PROBLEM:  Positive blood culture [R78.81] 04/29/2024 Yes    Ventricular fibrillation [I49.01] 04/24/2024 Yes    Hypertension [I10] 04/24/2024 Yes     Chronic      Problems Resolved During this Admission:       Discharged Condition: stable    Disposition: Home or Self Care    Follow Up:   Follow-up Information       Pako Main MD Follow up in 1 week(s).    Specialties: Electrophysiology, Cardiology  Contact information:  [address]  [place] [address] [phone]               Mir Randall DO Follow up.    Specialty: Infectious Diseases  Why: As needed  Contact information:  [address] [facility] [address] [phone]               [facility] Follow up.    Specialty: Home Health Services  Why: Home Health: Agency will call and schedule an appointment to visit.  Contact information:  [address] [facility] [address] [phone]                           Patient Instructions:      Diet Adult Regular       Significant Diagnostic Studies: Labs: BMP:   Recent Labs   Lab 04/30/24  0719 05/01/24  0543    108    138   K 4.2 4.3    108   CO2 26 27   BUN 40* 35*   CREATININE 1.5* 1.4   CALCIUM 9.8 9.7   MG 2.1 2.0    and CBC   Recent Labs   Lab 04/30/24  0719 05/01/24  0543   WBC 6.56 5.43   HGB 12.0* 10.3*    HCT 34.5* 29.4*    226     Microbiology: Blood Culture   Lab Results   Component Value Date    LABBLOO No Growth to date 04/29/2024    LABBLOO No Growth to date 04/29/2024    LABBLOO No Growth to date 04/29/2024    LABBLOO No Growth to date 04/29/2024     Radiology:   X-Ray Chest 1 View for Line/Tube Placement   Final Result      PICC catheter tip at the SVC level.         Electronically signed by: Chente Romero MD   Date:    05/01/2024   Time:    09:24           Pending Diagnostic Studies:       None           Medications:  Reconciled Home Medications:      Medication List        START taking these medications      dextrose 5 % in water (D5W) PgBk 100 mL with cefTRIAXone 2 gram SolR 2 g  Inject 2 g into the vein once daily. for 14 days            CONTINUE taking these medications      ALPRAZolam 0.25 MG tablet  Commonly known as: XANAX  Take 0.25 mg by mouth 3 (three) times daily as needed for Anxiety.     aspirin 81 MG Chew  Take 1 tablet (81 mg total) by mouth once daily.     metoprolol succinate 25 MG 24 hr tablet  Commonly known as: TOPROL-XL  Take 1 tablet (25 mg total) by mouth once daily.     testosterone cypionate 200 mg/mL injection  Commonly known as: DEPOTESTOTERONE CYPIONATE  Inject 200 mg into the muscle every 14 (fourteen) days.            STOP taking these medications      pravastatin 20 MG tablet  Commonly known as: PRAVACHOL              Indwelling Lines/Drains at time of discharge:   Lines/Drains/Airways       None                   Time spent on the discharge of patient: 40 minutes         Neil Rodriguez MD  Department of Hospital Medicine  [facility]

## 2024-05-01 NOTE — ASSESSMENT & PLAN NOTE
Patient reports passing black tarry stools at home x 1. Recommend doing an EGD in the morning.  Lab Results   Component Value Date    WBC 5.43 05/01/2024    HGB 10.3 (L) 05/01/2024    HCT 29.4 (L) 05/01/2024    MCV 92 05/01/2024     05/01/2024

## 2024-05-01 NOTE — PROCEDURES
Milind Melgar is a 53 y.o. male patient.    Temp: 98.1 °F (36.7 °C) (05/01/24 0729)  Pulse: 75 (05/01/24 0800)  Resp: 19 (05/01/24 0729)  BP: 110/62 (05/01/24 0729)  SpO2: 99 % (05/01/24 0729)  Weight: 85.5 kg (188 lb 7.9 oz) (04/29/24 0900)    PICC  Date/Time: 5/1/2024 7:56 AM  Performed by: Reji Hatch RN  Consent Done: Yes  Time out: Immediately prior to procedure a time out was called to verify the correct patient, procedure, equipment, support staff and site/side marked as required  Indications: med administration and vascular access  Anesthesia: local infiltration  Local anesthetic: lidocaine 1% without epinephrine  Anesthetic Total (mL): 3  Preparation: skin prepped with ChloraPrep  Skin prep agent dried: skin prep agent completely dried prior to procedure  Sterile barriers: all five maximum sterile barriers used - cap, mask, sterile gown, sterile gloves, and large sterile sheet  Hand hygiene: hand hygiene performed prior to central venous catheter insertion  Location details: left basilic  Catheter type: double lumen  Catheter size: 4 Fr  Catheter Length: 42cm    Ultrasound guidance: yes  Vessel Caliber: medium and patent, compressibility normal  Vascular Doppler: not done  Needle advanced into vessel with real time Ultrasound guidance.  Image recorded and saved.  Sterile sheath used.  no esophageal manometryNumber of attempts: 1  Post-procedure: blood return through all ports, chlorhexidine patch and sterile dressing applied  Estimated blood loss (mL): 0  Specimens: No  Implants: No  Assessment: placement verified by x-ray  Complications: none          Name Reji Hatch RN  5/1/2024

## 2024-05-01 NOTE — SUBJECTIVE & OBJECTIVE
Past Medical History:   Diagnosis Date    Diverticulitis     Hyperlipidemia     Hypertension     On mechanically assisted ventilation 04/24/2024    PVC's (premature ventricular contractions)        Past Surgical History:   Procedure Laterality Date    CORONARY ANGIOGRAPHY N/A 4/24/2024    Procedure: ANGIOGRAM, CORONARY ARTERY;  Surgeon: Fitz Ochoa MD;  Location: [facility] CATH LAB;  Service: Cardiology;  Laterality: N/A;    LEFT HEART CATHETERIZATION Left 4/24/2024    Procedure: Left heart cath;  Surgeon: Fitz Ochoa MD;  Location: [facility] CATH LAB;  Service: Cardiology;  Laterality: Left;    VENTRICULOGRAM, LEFT  4/24/2024    Procedure: Ventriculogram, Left;  Surgeon: Fitz Ochoa MD;  Location: [facility] CATH LAB;  Service: Cardiology;;       Review of patient's allergies indicates:  No Known Allergies  Family History    None       Tobacco Use    Smoking status: Never    Smokeless tobacco: Never   Substance and Sexual Activity    Alcohol use: Not on file    Drug use: Not on file    Sexual activity: Not on file     Review of Systems   Constitutional:  Positive for activity change, appetite change and fatigue. Negative for fever and unexpected weight change.   HENT:  Negative for congestion, ear pain, hearing loss, mouth sores, trouble swallowing and voice change.    Eyes:  Negative for pain, redness and itching.   Respiratory:  Negative for apnea, cough, choking, chest tightness, shortness of breath and wheezing.    Cardiovascular:  Negative for chest pain, palpitations and leg swelling.   Gastrointestinal:  Positive for abdominal pain and diarrhea. Negative for abdominal distention, anal bleeding, blood in stool, constipation, nausea and vomiting.   Endocrine: Negative for cold intolerance, heat intolerance and polyphagia.   Genitourinary:  Negative for dysuria, hematuria and urgency.   Musculoskeletal:  Negative for arthralgias, joint swelling and neck pain.   Skin:  Negative for pallor and rash.    Allergic/Immunologic: Negative for environmental allergies and food allergies.   Neurological:  Positive for dizziness, weakness and light-headedness. Negative for facial asymmetry.   Hematological:  Negative for adenopathy. Does not bruise/bleed easily.   Psychiatric/Behavioral:  Negative for agitation, behavioral problems, confusion and decreased concentration. The patient is nervous/anxious.      Objective:     Vital Signs (Most Recent):  Temp: 98.5 °F (36.9 °C) (05/01/24 1831)  Pulse: 83 (05/01/24 1829)  Resp: 18 (05/01/24 1829)  BP: 129/75 (05/01/24 1829)  SpO2: 100 % (05/01/24 1829) Vital Signs (24h Range):  Temp:  [98 °F (36.7 °C)-98.5 °F (36.9 °C)] 98.5 °F (36.9 °C)  Pulse:  [] 83  Resp:  [15-19] 18  SpO2:  [98 %-100 %] 100 %  BP: (110-131)/(60-75) 129/75     Weight: 85.2 kg (187 lb 13.3 oz) (05/01/24 1827)  Body mass index is 27.74 kg/m².    No intake or output data in the 24 hours ending 05/01/24 1855    Lines/Drains/Airways       None                    Physical Exam  Vitals and nursing note reviewed.   Constitutional:       Appearance: He is well-developed.   HENT:      Head: Normocephalic and atraumatic.   Eyes:      Conjunctiva/sclera: Conjunctivae normal.      Pupils: Pupils are equal, round, and reactive to light.   Neck:      Thyroid: No thyromegaly.      Trachea: No tracheal deviation.   Cardiovascular:      Rate and Rhythm: Normal rate and regular rhythm.   Pulmonary:      Effort: Pulmonary effort is normal.      Breath sounds: Normal breath sounds. No wheezing or rales.   Chest:      Chest wall: No tenderness.   Abdominal:      General: Bowel sounds are normal. There is no distension.      Palpations: Abdomen is soft. There is no mass.      Tenderness: There is no abdominal tenderness. There is no guarding.   Musculoskeletal:         General: Normal range of motion.      Cervical back: Normal range of motion and neck supple.   Lymphadenopathy:      Cervical: No cervical adenopathy.    Skin:     General: Skin is warm and dry.   Neurological:      Mental Status: He is alert and oriented to person, place, and time.      Cranial Nerves: No cranial nerve deficit.   Psychiatric:         Behavior: Behavior normal.          Significant Labs:  Recent Lab Results         05/01/24  0543        Albumin 3.5       ALP 52       ALT 33       Anion Gap 3       AST 16       Baso # 0.03       Basophil % 0.6       BILIRUBIN TOTAL 0.3  Comment: For infants and newborns, interpretation of results should be based  on gestational age, weight and in agreement with clinical  observations.    Premature Infant recommended reference ranges:  Up to 24 hours.............<8.0 mg/dL  Up to 48 hours............<12.0 mg/dL  3-5 days..................<15.0 mg/dL  6-29 days.................<15.0 mg/dL         BUN 35       Calcium 9.7       Chloride 108       CO2 27       Creatinine 1.4       Differential Method Automated       eGFR >60       Eos # 0.0       Eos % 0.7       Glucose 108       Gran # (ANC) 3.3       Gran % 60.3       Hematocrit 29.4       Hemoglobin 10.3       Immature Grans (Abs) 0.04  Comment: Mild elevation in immature granulocytes is non specific and   can be seen in a variety of conditions including stress response,   acute inflammation, trauma and pregnancy. Correlation with other   laboratory and clinical findings is essential.         Immature Granulocytes 0.7       Lymph # 1.4       Lymph % 26.5       Magnesium  2.0       MCH 32.1       MCHC 35.0       MCV 92       Mono # 0.6       Mono % 11.2       MPV 10.3       nRBC 0       Platelet Count 226       Potassium 4.3       PROTEIN TOTAL 6.0       RBC 3.21       RDW 12.3       Sodium 138       WBC 5.43               Significant Imaging: None at this time.

## 2024-05-02 ENCOUNTER — ANESTHESIA EVENT (OUTPATIENT)
Dept: ENDOSCOPY | Facility: HOSPITAL | Age: 54
End: 2024-05-02
Payer: COMMERCIAL

## 2024-05-02 ENCOUNTER — TELEPHONE (OUTPATIENT)
Dept: INFECTIOUS DISEASES | Facility: CLINIC | Age: 54
End: 2024-05-02
Payer: COMMERCIAL

## 2024-05-02 ENCOUNTER — PATIENT MESSAGE (OUTPATIENT)
Dept: GASTROENTEROLOGY | Facility: HOSPITAL | Age: 54
End: 2024-05-02
Payer: COMMERCIAL

## 2024-05-02 ENCOUNTER — ANESTHESIA (OUTPATIENT)
Dept: ENDOSCOPY | Facility: HOSPITAL | Age: 54
End: 2024-05-02
Payer: COMMERCIAL

## 2024-05-02 ENCOUNTER — HOSPITAL ENCOUNTER (OUTPATIENT)
Facility: HOSPITAL | Age: 54
Discharge: HOME | End: 2024-05-02
Attending: INTERNAL MEDICINE | Admitting: INTERNAL MEDICINE
Payer: COMMERCIAL

## 2024-05-02 DIAGNOSIS — D62 ACUTE POST-HEMORRHAGIC ANEMIA: ICD-10-CM

## 2024-05-02 DIAGNOSIS — K25.3 ACUTE GASTRIC ULCER WITHOUT HEMORRHAGE OR PERFORATION: Primary | ICD-10-CM

## 2024-05-02 DIAGNOSIS — K92.1 COMPLAINT OF MELENA: ICD-10-CM

## 2024-05-02 PROCEDURE — 88342 IMHCHEM/IMCYTCHM 1ST ANTB: CPT | Performed by: STUDENT IN AN ORGANIZED HEALTH CARE EDUCATION/TRAINING PROGRAM

## 2024-05-02 PROCEDURE — 25000003 PHARM REV CODE 250: Performed by: NURSE ANESTHETIST, CERTIFIED REGISTERED

## 2024-05-02 PROCEDURE — 88342 IMHCHEM/IMCYTCHM 1ST ANTB: CPT | Mod: 26,,, | Performed by: STUDENT IN AN ORGANIZED HEALTH CARE EDUCATION/TRAINING PROGRAM

## 2024-05-02 PROCEDURE — 88305 TISSUE EXAM BY PATHOLOGIST: CPT | Performed by: STUDENT IN AN ORGANIZED HEALTH CARE EDUCATION/TRAINING PROGRAM

## 2024-05-02 PROCEDURE — 88305 TISSUE EXAM BY PATHOLOGIST: CPT | Mod: 26,,, | Performed by: STUDENT IN AN ORGANIZED HEALTH CARE EDUCATION/TRAINING PROGRAM

## 2024-05-02 PROCEDURE — 63600175 PHARM REV CODE 636 W HCPCS: Performed by: NURSE ANESTHETIST, CERTIFIED REGISTERED

## 2024-05-02 PROCEDURE — 37000008 HC ANESTHESIA 1ST 15 MINUTES: Performed by: INTERNAL MEDICINE

## 2024-05-02 PROCEDURE — 27201012 HC FORCEPS, HOT/COLD, DISP: Performed by: INTERNAL MEDICINE

## 2024-05-02 PROCEDURE — 37000009 HC ANESTHESIA EA ADD 15 MINS: Performed by: INTERNAL MEDICINE

## 2024-05-02 PROCEDURE — 63600175 PHARM REV CODE 636 W HCPCS: Performed by: INTERNAL MEDICINE

## 2024-05-02 PROCEDURE — 43239 EGD BIOPSY SINGLE/MULTIPLE: CPT | Performed by: INTERNAL MEDICINE

## 2024-05-02 PROCEDURE — 43239 EGD BIOPSY SINGLE/MULTIPLE: CPT | Mod: ,,, | Performed by: INTERNAL MEDICINE

## 2024-05-02 RX ORDER — PANTOPRAZOLE SODIUM 40 MG/1
40 TABLET, DELAYED RELEASE ORAL DAILY
Qty: 90 TABLET | Refills: 3 | Status: SHIPPED | OUTPATIENT
Start: 2024-05-02

## 2024-05-02 RX ORDER — MISOPROSTOL 200 UG/1
200 TABLET ORAL EVERY 6 HOURS
Qty: 120 TABLET | Refills: 0 | Status: SHIPPED | OUTPATIENT
Start: 2024-05-02 | End: 2024-06-01

## 2024-05-02 RX ORDER — LIDOCAINE HYDROCHLORIDE 10 MG/ML
INJECTION, SOLUTION EPIDURAL; INFILTRATION; INTRACAUDAL; PERINEURAL
Status: DISCONTINUED | OUTPATIENT
Start: 2024-05-02 | End: 2024-05-02

## 2024-05-02 RX ORDER — SODIUM CHLORIDE, SODIUM LACTATE, POTASSIUM CHLORIDE, CALCIUM CHLORIDE 600; 310; 30; 20 MG/100ML; MG/100ML; MG/100ML; MG/100ML
INJECTION, SOLUTION INTRAVENOUS CONTINUOUS PRN
Status: DISCONTINUED | OUTPATIENT
Start: 2024-05-02 | End: 2024-05-02

## 2024-05-02 RX ORDER — PROPOFOL 10 MG/ML
VIAL (ML) INTRAVENOUS
Status: DISCONTINUED | OUTPATIENT
Start: 2024-05-02 | End: 2024-05-02

## 2024-05-02 RX ORDER — HEPARIN 100 UNIT/ML
5 SYRINGE INTRAVENOUS ONCE
Status: COMPLETED | OUTPATIENT
Start: 2024-05-02 | End: 2024-05-02

## 2024-05-02 RX ADMIN — Medication 50 MG: at 10:05

## 2024-05-02 RX ADMIN — LIDOCAINE HYDROCHLORIDE 100 MG: 10 SOLUTION INTRAVENOUS at 10:05

## 2024-05-02 RX ADMIN — HEPARIN 500 UNITS: 100 SYRINGE at 11:05

## 2024-05-02 RX ADMIN — SODIUM CHLORIDE, SODIUM LACTATE, POTASSIUM CHLORIDE, AND CALCIUM CHLORIDE: 600; 310; 30; 20 INJECTION, SOLUTION INTRAVENOUS at 10:05

## 2024-05-02 NOTE — ANESTHESIA PREPROCEDURE EVALUATION
05/02/2024  Milind Melgar is a 53 y.o., male.    Patient Active Problem List   Diagnosis    History of cardiac arrest    Ventricular fibrillation    SLIME (acute kidney injury)    Shock liver    Hypokalemia    Lactic acidosis    Hypertension    Hyperlipidemia    History of palpitations    NSTEMI (non-ST elevated myocardial infarction)    Positive blood culture    Complaint of melena    Acute post-hemorrhagic anemia    Weakness      Results for orders placed during the hospital encounter of 04/23/24    Echo    Interpretation Summary    Left Ventricle: The left ventricle is normal in size. Normal wall thickness. There is concentric remodeling. There is normal systolic function with a visually estimated ejection fraction of 60 - 65%. Grade II diastolic dysfunction. Elevated left ventricular filling pressure.    Right Ventricle: Right ventricle was not well visualized due to poor acoustic window. Right ventricle wall motion  is unable to be assessed. Systolic function could not be assesed.    Aortic Valve: Aortic valve peak velocity is 1.72 m/s. Mean gradient is 7 mmHg.    Mitral Valve: There is mild regurgitation.    IVC/SVC: Patient is ventilated, cannot use IVC diameter to estimate right atrial pressure.     Lab Results   Component Value Date    WBC 8.74 05/01/2024    HGB 9.9 (L) 05/01/2024    HCT 27.5 (L) 05/01/2024    MCV 89 05/01/2024     05/01/2024            Chemistry        Component Value Date/Time     05/01/2024 1928    K 3.7 05/01/2024 1928     05/01/2024 1928    CO2 23 05/01/2024 1928    BUN 27 (H) 05/01/2024 1928    CREATININE 1.4 05/01/2024 1928     05/01/2024 1928        Component Value Date/Time    CALCIUM 9.8 05/01/2024 1928    ALKPHOS 60 05/01/2024 1928    AST 18 05/01/2024 1928    ALT 35 05/01/2024 1928    BILITOT 0.4 05/01/2024 1928         Pre-op  Assessment    I have reviewed the Patient Summary Reports.     I have reviewed the Nursing Notes. I have reviewed the NPO Status.   I have reviewed the Medications.     Review of Systems  Cardiovascular:     Hypertension  Past MI                             Hx of Myocardial Infarction                  Hypertension         Renal/:  Chronic Renal Disease        Kidney Function/Disease             Hepatic/GI:      Liver Disease, Hepatitis        Liver Disease, Hepatitis            Physical Exam  General: Well nourished, Cooperative, Alert and Oriented    Airway:  Mallampati: II   Mouth Opening: Normal  TM Distance: Normal  Tongue: Normal  Neck ROM: Normal ROM    Dental:  Intact        Anesthesia Plan  Type of Anesthesia, risks & benefits discussed:    Anesthesia Type: MAC, Gen ETT  Intra-op Monitoring Plan: Standard ASA Monitors  Post Op Pain Control Plan: multimodal analgesia  Induction:  IV  Informed Consent: Informed consent signed with the Patient and all parties understand the risks and agree with anesthesia plan.  All questions answered.   ASA Score: 3  Day of Surgery Review of History & Physical: H&P Update referred to the surgeon/provider.    Ready For Surgery From Anesthesia Perspective.     .

## 2024-05-02 NOTE — BRIEF OP NOTE
Endoscopy Discharge Summary      Admit Date: 5/2/2024    Discharge Date and Time:  5/2/2024 11:03 AM    Attending Physician: Nelson Swain MD     Discharge Physician: Nelson Swain MD     Principal Admitting Diagnoses: Complaint of melena         Discharge Diagnosis: The primary encounter diagnosis was Acute gastric ulcer without hemorrhage or perforation. Diagnoses of Complaint of melena and Acute post-hemorrhagic anemia were also pertinent to this visit.     Discharged Condition: Good    Indication for Admission: Complaint of melena     Hospital Course: Patient was admitted for an inpatient procedure and tolerated the procedure well with no complications.    Significant Diagnostic Studies: EGD    Pathology (if any):  Specimen (24h ago, onward)       Start     Ordered    05/02/24 1044  Specimen to Pathology, Surgery Gastrointestinal tract  Once        Comments: Pre-op Diagnosis: Melena [K92.1]Procedure(s):EGD (ESOPHAGOGASTRODUODENOSCOPY) Number of specimens: 1Name of specimens: 1 gastric antrum ulcer bx     References:    Click here for ordering Quick Tip   Question Answer Comment   Procedure Type: Gastrointestinal tract    Release to patient Immediate        05/02/24 1054                    Disposition: Home.    Bleeding: None    No Implants         Follow Up/Patient Instructions:   Current Discharge Medication List        START taking these medications    Details   miSOPROStoL (CYTOTEC) 200 MCG Tab Take 1 tablet (200 mcg total) by mouth every 6 (six) hours.  Qty: 120 tablet, Refills: 0    Associated Diagnoses: Acute gastric ulcer without hemorrhage or perforation; Complaint of melena; Acute post-hemorrhagic anemia      pantoprazole (PROTONIX) 40 MG tablet Take 1 tablet (40 mg total) by mouth once daily.  Qty: 90 tablet, Refills: 3    Associated Diagnoses: Acute gastric ulcer without hemorrhage or perforation           CONTINUE these medications which have NOT CHANGED    Details   ALPRAZolam (XANAX) 0.25 MG  tablet Take 0.25 mg by mouth 3 (three) times daily as needed for Anxiety.      aspirin 81 MG Chew Take 1 tablet (81 mg total) by mouth once daily.  Qty: 30 tablet, Refills: 0      metoprolol succinate (TOPROL-XL) 25 MG 24 hr tablet Take 1 tablet (25 mg total) by mouth once daily.  Qty: 30 tablet, Refills: 0    Comments: .      testosterone cypionate (DEPOTESTOTERONE CYPIONATE) 200 mg/mL injection Inject 200 mg into the muscle every 14 (fourteen) days.      dextrose 5 % in water (D5W) PgBk 100 mL with cefTRIAXone 2 gram SolR 2 g Inject 2 g into the vein once daily. for 14 days             No discharge procedures on file.

## 2024-05-02 NOTE — PLAN OF CARE
Dr. Swain  at bedside discussing findings. VSS. Patient alert. No N/V. No bleeding, no pain. Patient released from unit.

## 2024-05-02 NOTE — ANESTHESIA POSTPROCEDURE EVALUATION
Anesthesia Post Evaluation    Patient: Milind Melgar    Procedure(s) Performed: Procedure(s) (LRB):  EGD (ESOPHAGOGASTRODUODENOSCOPY) (Left)    Final Anesthesia Type: MAC      Patient location during evaluation: GI PACU  Patient participation: Yes- Able to Participate  Level of consciousness: awake and alert  Post-procedure vital signs: reviewed and stable  Pain management: adequate  Airway patency: patent    PONV status at discharge: No PONV  Anesthetic complications: no      Cardiovascular status: blood pressure returned to baseline, hemodynamically stable and stable  Respiratory status: unassisted, room air and spontaneous ventilation  Hydration status: euvolemic  Follow-up not needed.              Vitals Value Taken Time   /77 05/02/24 1124   Temp 36.2 °C (97.2 °F) 05/02/24 1112   Pulse 89 05/02/24 1124   Resp 17 05/02/24 1124   SpO2 99 % 05/02/24 1124         Event Time   Out of Recovery 11:45:44         Pain/Yobani Score: Pain Rating Prior to Med Admin: 0 (5/1/2024  8:00 AM)  Pain Rating Post Med Admin: 0 (5/1/2024  8:00 AM)  Yobani Score: 10 (5/2/2024 11:24 AM)

## 2024-05-02 NOTE — PROGRESS NOTES
Asked to review case prior to endoscopy .  Seen and examined.  I see no cardiac contraindication to proceed . Would keep lifevest on.   Discussed with DR RENEE

## 2024-05-02 NOTE — ASSESSMENT & PLAN NOTE
EGD in the AM. Depending on the results of the EGD, will need to consider performing a colonoscopy prior to discharge. His last colonoscopy was 2 years ago outside [facility]. He has a history of diverticulosis and diverticulitis.

## 2024-05-02 NOTE — TELEPHONE ENCOUNTER
----- Message from Trupti Reyes LPN sent at 5/1/2024  2:43 PM CDT -----  Contact: Vidya/spouse    ----- Message -----  From: Halle Borden  Sent: 5/1/2024   1:23 PM CDT  To: Prakash Hester Staff    Vidya Is requesting a  call back regarding clarification on if Milind is needing to be seen for a hospital follow up or if the procedure will be done.  Please give her a call back at [phone]

## 2024-05-02 NOTE — DISCHARGE INSTRUCTIONS
Dear Milind Melgar      If you develop fevers, severe abdominal pain, significant bleeding, nausea or vomiting, please contact us or come to our Emergency Department at [facility].  Our  contact number is [phone] available for emergencies or any question that you may have.      You may return to normal activities tomorrow.  Written discharge instructions were provided to you today and have them handy since you may not remember our conversation today.       If you take Aspirin, please resume taking it at your prior dose today. If we obtained biopsies or removed polyps during your procedure, we will contact you within 5 to 6 days with the results.      Thanks for trusting us with your healthcare needs.      Sincerely,     Nelson Swain M.D.        To rate your experience with Dr. Swain, please click on the link below     [email or web address]

## 2024-05-02 NOTE — H&P (VIEW-ONLY)
[facility] - Emergency Dept.  Gastroenterology  Consult Note    Patient Name: Milind Melgar  MRN: [id number]  Admission Date: 5/1/2024  Hospital Length of Stay: 0 days  Code Status: Prior   Attending Provider: Aurora Ruiz MD   Consulting Provider: Nelson Swain MD  Primary Care Physician: ANNE Ford Jr., NP  Principal Problem:Complaint of melena    Inpatient consult to Gastroenterology  Consult performed by: Nelson Swain MD  Consult ordered by: Aurora Ruiz MD  Reason for consult: Melena and post hemorrhagic anemia.        Subjective:     HPI:  Milind was admitted last week after cardiac arrest at home, witnessed by his wife and daughter, who started CPR. Apparently he did not have a pulse for a couple of minutes and when EMS arrived he was intubated. He had an extensive work up and also went home with a PICC line to treat an infection. He called me today feeling lightheaded and reported passing one episode of black tarry stools. He also reports epigastric discomfort, increased belching and indigestion.     A CBC drawn today:  Lab Results   Component Value Date    WBC 5.43 05/01/2024    HGB 10.3 (L) 05/01/2024    HCT 29.4 (L) 05/01/2024    MCV 92 05/01/2024     05/01/2024     I advised him to come to the ED for work up and admit.   Recent Results (from the past 336 hour(s))   CBC with Automated Differential    Collection Time: 05/01/24  5:43 AM   Result Value Ref Range    WBC 5.43 3.90 - 12.70 K/uL    Hemoglobin 10.3 (L) 14.0 - 18.0 g/dL    Hematocrit 29.4 (L) 40.0 - 54.0 %    Platelets 226 150 - 450 K/uL   CBC with Automated Differential    Collection Time: 04/30/24  7:19 AM   Result Value Ref Range    WBC 6.56 3.90 - 12.70 K/uL    Hemoglobin 12.0 (L) 14.0 - 18.0 g/dL    Hematocrit 34.5 (L) 40.0 - 54.0 %    Platelets 235 150 - 450 K/uL   CBC auto differential    Collection Time: 04/29/24  9:47 AM   Result Value Ref Range    WBC 5.81 3.90 - 12.70 K/uL    Hemoglobin 13.4 (L) 14.0 - 18.0 g/dL     Hematocrit 37.2 (L) 40.0 - 54.0 %    Platelets 212 150 - 450 K/uL     There has been a drop in his CBC over a week.     Past Medical History:   Diagnosis Date    Diverticulitis     Hyperlipidemia     Hypertension     On mechanically assisted ventilation 04/24/2024    PVC's (premature ventricular contractions)        Past Surgical History:   Procedure Laterality Date    CORONARY ANGIOGRAPHY N/A 4/24/2024    Procedure: ANGIOGRAM, CORONARY ARTERY;  Surgeon: Fitz Ochoa MD;  Location: [facility] CATH LAB;  Service: Cardiology;  Laterality: N/A;    LEFT HEART CATHETERIZATION Left 4/24/2024    Procedure: Left heart cath;  Surgeon: Fitz Ochoa MD;  Location: [facility] CATH LAB;  Service: Cardiology;  Laterality: Left;    VENTRICULOGRAM, LEFT  4/24/2024    Procedure: Ventriculogram, Left;  Surgeon: Fitz Ochoa MD;  Location: [facility] CATH LAB;  Service: Cardiology;;       Review of patient's allergies indicates:  No Known Allergies  Family History    None       Tobacco Use    Smoking status: Never    Smokeless tobacco: Never   Substance and Sexual Activity    Alcohol use: Not on file    Drug use: Not on file    Sexual activity: Not on file     Review of Systems   Constitutional:  Positive for activity change, appetite change and fatigue. Negative for fever and unexpected weight change.   HENT:  Negative for congestion, ear pain, hearing loss, mouth sores, trouble swallowing and voice change.    Eyes:  Negative for pain, redness and itching.   Respiratory:  Negative for apnea, cough, choking, chest tightness, shortness of breath and wheezing.    Cardiovascular:  Negative for chest pain, palpitations and leg swelling.   Gastrointestinal:  Positive for abdominal pain and diarrhea. Negative for abdominal distention, anal bleeding, blood in stool, constipation, nausea and vomiting.   Endocrine: Negative for cold intolerance, heat intolerance and polyphagia.   Genitourinary:  Negative for dysuria, hematuria and urgency.    Musculoskeletal:  Negative for arthralgias, joint swelling and neck pain.   Skin:  Negative for pallor and rash.   Allergic/Immunologic: Negative for environmental allergies and food allergies.   Neurological:  Positive for dizziness, weakness and light-headedness. Negative for facial asymmetry.   Hematological:  Negative for adenopathy. Does not bruise/bleed easily.   Psychiatric/Behavioral:  Negative for agitation, behavioral problems, confusion and decreased concentration. The patient is nervous/anxious.      Objective:     Vital Signs (Most Recent):  Temp: 98.5 °F (36.9 °C) (05/01/24 1831)  Pulse: 83 (05/01/24 1829)  Resp: 18 (05/01/24 1829)  BP: 129/75 (05/01/24 1829)  SpO2: 100 % (05/01/24 1829) Vital Signs (24h Range):  Temp:  [98 °F (36.7 °C)-98.5 °F (36.9 °C)] 98.5 °F (36.9 °C)  Pulse:  [] 83  Resp:  [15-19] 18  SpO2:  [98 %-100 %] 100 %  BP: (110-131)/(60-75) 129/75     Weight: 85.2 kg (187 lb 13.3 oz) (05/01/24 1827)  Body mass index is 27.74 kg/m².    No intake or output data in the 24 hours ending 05/01/24 1855    Lines/Drains/Airways       None                    Physical Exam  Vitals and nursing note reviewed.   Constitutional:       Appearance: He is well-developed.   HENT:      Head: Normocephalic and atraumatic.   Eyes:      Conjunctiva/sclera: Conjunctivae normal.      Pupils: Pupils are equal, round, and reactive to light.   Neck:      Thyroid: No thyromegaly.      Trachea: No tracheal deviation.   Cardiovascular:      Rate and Rhythm: Normal rate and regular rhythm.   Pulmonary:      Effort: Pulmonary effort is normal.      Breath sounds: Normal breath sounds. No wheezing or rales.   Chest:      Chest wall: No tenderness.   Abdominal:      General: Bowel sounds are normal. There is no distension.      Palpations: Abdomen is soft. There is no mass.      Tenderness: There is no abdominal tenderness. There is no guarding.   Musculoskeletal:         General: Normal range of motion.       Cervical back: Normal range of motion and neck supple.   Lymphadenopathy:      Cervical: No cervical adenopathy.   Skin:     General: Skin is warm and dry.   Neurological:      Mental Status: He is alert and oriented to person, place, and time.      Cranial Nerves: No cranial nerve deficit.   Psychiatric:         Behavior: Behavior normal.          Significant Labs:  Recent Lab Results         05/01/24  0543        Albumin 3.5       ALP 52       ALT 33       Anion Gap 3       AST 16       Baso # 0.03       Basophil % 0.6       BILIRUBIN TOTAL 0.3  Comment: For infants and newborns, interpretation of results should be based  on gestational age, weight and in agreement with clinical  observations.    Premature Infant recommended reference ranges:  Up to 24 hours.............<8.0 mg/dL  Up to 48 hours............<12.0 mg/dL  3-5 days..................<15.0 mg/dL  6-29 days.................<15.0 mg/dL         BUN 35       Calcium 9.7       Chloride 108       CO2 27       Creatinine 1.4       Differential Method Automated       eGFR >60       Eos # 0.0       Eos % 0.7       Glucose 108       Gran # (ANC) 3.3       Gran % 60.3       Hematocrit 29.4       Hemoglobin 10.3       Immature Grans (Abs) 0.04  Comment: Mild elevation in immature granulocytes is non specific and   can be seen in a variety of conditions including stress response,   acute inflammation, trauma and pregnancy. Correlation with other   laboratory and clinical findings is essential.         Immature Granulocytes 0.7       Lymph # 1.4       Lymph % 26.5       Magnesium  2.0       MCH 32.1       MCHC 35.0       MCV 92       Mono # 0.6       Mono % 11.2       MPV 10.3       nRBC 0       Platelet Count 226       Potassium 4.3       PROTEIN TOTAL 6.0       RBC 3.21       RDW 12.3       Sodium 138       WBC 5.43               Significant Imaging: None at this time.   Assessment/Plan:     Cardiac/Vascular  History of cardiac arrest  Recommend consulting  cardiology prior to any interventions.     ID  Positive blood culture  I recommend informing our ID team.     Oncology  Acute post-hemorrhagic anemia  EGD in the AM. Depending on the results of the EGD, will need to consider performing a colonoscopy prior to discharge. His last colonoscopy was 2 years ago outside [facility]. He has a history of diverticulosis and diverticulitis.     GI  * Complaint of melena  Patient reports passing black tarry stools at home x 1. Recommend doing an EGD in the morning.  Lab Results   Component Value Date    WBC 5.43 05/01/2024    HGB 10.3 (L) 05/01/2024    HCT 29.4 (L) 05/01/2024    MCV 92 05/01/2024     05/01/2024           Other  Weakness  New onset of weakness. Continue monitoring and replace any possible blood losses.       Risks, benefits and alternative options were discussed with the patient. Risks including but not limited to infection, bleeding, heart or respiratory problems and perforation that may require surgery were all explained to the patient. The patient had a chance for questions if there were doubts or concerns about the test. The referring provider will be notified that our consultation is complete and available through the patient's records.      Thank you for your consult. I will follow-up with patient. Please contact us if you have any additional questions.    Nelson Swain MD  Gastroenterology  '[place] - Emergency Dept.

## 2024-05-02 NOTE — CONSULTS
Former[facility], later [facility] - Emergency Dept.  Gastroenterology  Consult Note    Patient Name: Milind Melgar  MRN: [id number]  Admission Date: 5/1/2024  Hospital Length of Stay: 0 days  Code Status: Prior   Attending Provider: Aurora Ruiz MD   Consulting Provider: Nelson Swain MD  Primary Care Physician: ANNE Ford Jr., NP  Principal Problem:Complaint of melena    Inpatient consult to Gastroenterology  Consult performed by: Nelson Swain MD  Consult ordered by: Aurora Ruiz MD  Reason for consult: Melena and post hemorrhagic anemia.        Subjective:     HPI:  Milind was admitted last week after cardiac arrest at home, witnessed by his wife and daughter, who started CPR. Apparently he did not have a pulse for a couple of minutes and when EMS arrived he was intubated. He had an extensive work up and also went home with a PICC line to treat an infection. He called me today feeling lightheaded and reported passing one episode of black tarry stools. He also reports epigastric discomfort, increased belching and indigestion.     A CBC drawn today:  Lab Results   Component Value Date    WBC 5.43 05/01/2024    HGB 10.3 (L) 05/01/2024    HCT 29.4 (L) 05/01/2024    MCV 92 05/01/2024     05/01/2024     I advised him to come to the ED for work up and admit.   Recent Results (from the past 336 hour(s))   CBC with Automated Differential    Collection Time: 05/01/24  5:43 AM   Result Value Ref Range    WBC 5.43 3.90 - 12.70 K/uL    Hemoglobin 10.3 (L) 14.0 - 18.0 g/dL    Hematocrit 29.4 (L) 40.0 - 54.0 %    Platelets 226 150 - 450 K/uL   CBC with Automated Differential    Collection Time: 04/30/24  7:19 AM   Result Value Ref Range    WBC 6.56 3.90 - 12.70 K/uL    Hemoglobin 12.0 (L) 14.0 - 18.0 g/dL    Hematocrit 34.5 (L) 40.0 - 54.0 %    Platelets 235 150 - 450 K/uL   CBC auto differential    Collection Time: 04/29/24  9:47 AM   Result Value Ref Range    WBC 5.81 3.90 - 12.70 K/uL    Hemoglobin 13.4 (L) 14.0 - 18.0 g/dL     Hematocrit 37.2 (L) 40.0 - 54.0 %    Platelets 212 150 - 450 K/uL     There has been a drop in his CBC over a week.     Past Medical History:   Diagnosis Date    Diverticulitis     Hyperlipidemia     Hypertension     On mechanically assisted ventilation 04/24/2024    PVC's (premature ventricular contractions)        Past Surgical History:   Procedure Laterality Date    CORONARY ANGIOGRAPHY N/A 4/24/2024    Procedure: ANGIOGRAM, CORONARY ARTERY;  Surgeon: Fitz Ochoa MD;  Location: [facility] CATH LAB;  Service: Cardiology;  Laterality: N/A;    LEFT HEART CATHETERIZATION Left 4/24/2024    Procedure: Left heart cath;  Surgeon: Fitz Ochoa MD;  Location: [facility] CATH LAB;  Service: Cardiology;  Laterality: Left;    VENTRICULOGRAM, LEFT  4/24/2024    Procedure: Ventriculogram, Left;  Surgeon: Fitz Ochoa MD;  Location: [facility] CATH LAB;  Service: Cardiology;;       Review of patient's allergies indicates:  No Known Allergies  Family History    None       Tobacco Use    Smoking status: Never    Smokeless tobacco: Never   Substance and Sexual Activity    Alcohol use: Not on file    Drug use: Not on file    Sexual activity: Not on file     Review of Systems   Constitutional:  Positive for activity change, appetite change and fatigue. Negative for fever and unexpected weight change.   HENT:  Negative for congestion, ear pain, hearing loss, mouth sores, trouble swallowing and voice change.    Eyes:  Negative for pain, redness and itching.   Respiratory:  Negative for apnea, cough, choking, chest tightness, shortness of breath and wheezing.    Cardiovascular:  Negative for chest pain, palpitations and leg swelling.   Gastrointestinal:  Positive for abdominal pain and diarrhea. Negative for abdominal distention, anal bleeding, blood in stool, constipation, nausea and vomiting.   Endocrine: Negative for cold intolerance, heat intolerance and polyphagia.   Genitourinary:  Negative for dysuria, hematuria and urgency.    Musculoskeletal:  Negative for arthralgias, joint swelling and neck pain.   Skin:  Negative for pallor and rash.   Allergic/Immunologic: Negative for environmental allergies and food allergies.   Neurological:  Positive for dizziness, weakness and light-headedness. Negative for facial asymmetry.   Hematological:  Negative for adenopathy. Does not bruise/bleed easily.   Psychiatric/Behavioral:  Negative for agitation, behavioral problems, confusion and decreased concentration. The patient is nervous/anxious.      Objective:     Vital Signs (Most Recent):  Temp: 98.5 °F (36.9 °C) (05/01/24 1831)  Pulse: 83 (05/01/24 1829)  Resp: 18 (05/01/24 1829)  BP: 129/75 (05/01/24 1829)  SpO2: 100 % (05/01/24 1829) Vital Signs (24h Range):  Temp:  [98 °F (36.7 °C)-98.5 °F (36.9 °C)] 98.5 °F (36.9 °C)  Pulse:  [] 83  Resp:  [15-19] 18  SpO2:  [98 %-100 %] 100 %  BP: (110-131)/(60-75) 129/75     Weight: 85.2 kg (187 lb 13.3 oz) (05/01/24 1827)  Body mass index is 27.74 kg/m².    No intake or output data in the 24 hours ending 05/01/24 1855    Lines/Drains/Airways       None                    Physical Exam  Vitals and nursing note reviewed.   Constitutional:       Appearance: He is well-developed.   HENT:      Head: Normocephalic and atraumatic.   Eyes:      Conjunctiva/sclera: Conjunctivae normal.      Pupils: Pupils are equal, round, and reactive to light.   Neck:      Thyroid: No thyromegaly.      Trachea: No tracheal deviation.   Cardiovascular:      Rate and Rhythm: Normal rate and regular rhythm.   Pulmonary:      Effort: Pulmonary effort is normal.      Breath sounds: Normal breath sounds. No wheezing or rales.   Chest:      Chest wall: No tenderness.   Abdominal:      General: Bowel sounds are normal. There is no distension.      Palpations: Abdomen is soft. There is no mass.      Tenderness: There is no abdominal tenderness. There is no guarding.   Musculoskeletal:         General: Normal range of motion.       Cervical back: Normal range of motion and neck supple.   Lymphadenopathy:      Cervical: No cervical adenopathy.   Skin:     General: Skin is warm and dry.   Neurological:      Mental Status: He is alert and oriented to person, place, and time.      Cranial Nerves: No cranial nerve deficit.   Psychiatric:         Behavior: Behavior normal.          Significant Labs:  Recent Lab Results         05/01/24  0543        Albumin 3.5       ALP 52       ALT 33       Anion Gap 3       AST 16       Baso # 0.03       Basophil % 0.6       BILIRUBIN TOTAL 0.3  Comment: For infants and newborns, interpretation of results should be based  on gestational age, weight and in agreement with clinical  observations.    Premature Infant recommended reference ranges:  Up to 24 hours.............<8.0 mg/dL  Up to 48 hours............<12.0 mg/dL  3-5 days..................<15.0 mg/dL  6-29 days.................<15.0 mg/dL         BUN 35       Calcium 9.7       Chloride 108       CO2 27       Creatinine 1.4       Differential Method Automated       eGFR >60       Eos # 0.0       Eos % 0.7       Glucose 108       Gran # (ANC) 3.3       Gran % 60.3       Hematocrit 29.4       Hemoglobin 10.3       Immature Grans (Abs) 0.04  Comment: Mild elevation in immature granulocytes is non specific and   can be seen in a variety of conditions including stress response,   acute inflammation, trauma and pregnancy. Correlation with other   laboratory and clinical findings is essential.         Immature Granulocytes 0.7       Lymph # 1.4       Lymph % 26.5       Magnesium  2.0       MCH 32.1       MCHC 35.0       MCV 92       Mono # 0.6       Mono % 11.2       MPV 10.3       nRBC 0       Platelet Count 226       Potassium 4.3       PROTEIN TOTAL 6.0       RBC 3.21       RDW 12.3       Sodium 138       WBC 5.43               Significant Imaging: None at this time.   Assessment/Plan:     Cardiac/Vascular  History of cardiac arrest  Recommend consulting  cardiology prior to any interventions.     ID  Positive blood culture  I recommend informing our ID team.     Oncology  Acute post-hemorrhagic anemia  EGD in the AM. Depending on the results of the EGD, will need to consider performing a colonoscopy prior to discharge. His last colonoscopy was 2 years ago outside [facility]. He has a history of diverticulosis and diverticulitis.     GI  * Complaint of melena  Patient reports passing black tarry stools at home x 1. Recommend doing an EGD in the morning.  Lab Results   Component Value Date    WBC 5.43 05/01/2024    HGB 10.3 (L) 05/01/2024    HCT 29.4 (L) 05/01/2024    MCV 92 05/01/2024     05/01/2024           Other  Weakness  New onset of weakness. Continue monitoring and replace any possible blood losses.       Risks, benefits and alternative options were discussed with the patient. Risks including but not limited to infection, bleeding, heart or respiratory problems and perforation that may require surgery were all explained to the patient. The patient had a chance for questions if there were doubts or concerns about the test. The referring provider will be notified that our consultation is complete and available through the patient's records.      Thank you for your consult. I will follow-up with patient. Please contact us if you have any additional questions.    Nelson Swain MD  Gastroenterology  '[place] - Emergency Dept.

## 2024-05-02 NOTE — ED PROVIDER NOTES
SCRIBE #1 NOTE: I, Segundo Smith, am scribing for, and in the presence of, Aurora Ruiz MD. I have scribed the entire note.       History     Chief Complaint   Patient presents with    Dizziness     Lightheadedness and dizziness this morning. Hx sudden cardiac death. Verbalizes concern with recent drop in blood labs     Review of patient's allergies indicates:  No Known Allergies      History of Present Illness     HPI    5/1/2024, 7:27 PM  History obtained from the patient      History of Present Illness: Milind Melgar is a 53 y.o. male patient with a PMHx of HTN, HLD, diverticulitis, PVC's, and recent sudden cardiac arrest who presents to the Emergency Department for evaluation of dizziness which onset this morning. Pt came into ED on 4/23 due to a sudden cardiac arrest, and pt was called to return Monday due to a positive blood culture. Pt is receiving IV abx Unasyn through a PICC line in his L arm, which pt reports is tender and swollen. Pt also presents to ED today due to black stool and a recent drop in blood labs. Symptoms are constant and moderate in severity. Associated sxs include lightheadedness, weakness, diarrhea, weight loss, and decreased appetite. Pt also has been having slight palpitations for 3 days (1-2 every 5hrs). Patient denies any abd pain, nausea, and all other sxs at this time. Pt has an ASED and a life vest, while he also has a visit on the 13th with his cardiologist. Pt took pantoprazole at 3pm, heparin last Monday, and his other medications regularly. No further complaints or concerns at this time.       Arrival mode: Personal vehicle    PCP: ANNE Ford Jr., NP        Past Medical History:  Past Medical History:   Diagnosis Date    Diverticulitis     Hyperlipidemia     Hypertension     On mechanically assisted ventilation 04/24/2024    PVC's (premature ventricular contractions)        Past Surgical History:  Past Surgical History:   Procedure Laterality Date     CORONARY ANGIOGRAPHY N/A 4/24/2024    Procedure: ANGIOGRAM, CORONARY ARTERY;  Surgeon: Fitz Ochoa MD;  Location: [facility] CATH LAB;  Service: Cardiology;  Laterality: N/A;    LEFT HEART CATHETERIZATION Left 4/24/2024    Procedure: Left heart cath;  Surgeon: Fitz Ochoa MD;  Location: [facility] CATH LAB;  Service: Cardiology;  Laterality: Left;    VENTRICULOGRAM, LEFT  4/24/2024    Procedure: Ventriculogram, Left;  Surgeon: Fitz Ochoa MD;  Location: [facility] CATH LAB;  Service: Cardiology;;         Family History:  No family history on file.    Social History:  Social History     Tobacco Use    Smoking status: Never    Smokeless tobacco: Never    Tobacco comments:     Past vaping    Substance and Sexual Activity    Alcohol use: Yes     Comment: rare    Drug use: Not on file    Sexual activity: Not on file        Review of Systems     Review of Systems   Constitutional:  Positive for appetite change (decreased) and unexpected weight change (loss). Negative for fever.   HENT:  Negative for sore throat.    Respiratory:  Negative for shortness of breath.    Cardiovascular:  Positive for palpitations (1-2 every 5 hrs). Negative for chest pain.   Gastrointestinal:  Positive for blood in stool (black stool) and diarrhea. Negative for abdominal pain and nausea.   Genitourinary:  Negative for dysuria.   Musculoskeletal:  Negative for back pain.   Skin:  Negative for rash.   Neurological:  Positive for dizziness, weakness and light-headedness.   Hematological:  Does not bruise/bleed easily.      Physical Exam     Initial Vitals   BP Pulse Resp Temp SpO2   05/01/24 1829 05/01/24 1829 05/01/24 1829 05/01/24 1831 05/01/24 1829   129/75 83 18 98.5 °F (36.9 °C) 100 %      MAP       --                 Physical Exam  Nursing Notes and Vital Signs Reviewed.  Constitutional: Patient is in no acute distress. Well-developed and well-nourished.  Head: Atraumatic. Normocephalic.  Eyes: PERRL. EOM intact. Conjunctivae are not  pale. No scleral icterus.  ENT: Mucous membranes are moist. Oropharynx is clear and symmetric.    Neck: Supple. Full ROM. No lymphadenopathy.  Cardiovascular: Regular rate. Regular rhythm. No murmurs, rubs, or gallops. Distal pulses are 2+ and symmetric. PICC line L arm.  Pulmonary/Chest: No respiratory distress. Clear to auscultation bilaterally. No wheezing or rales.  Abdominal: Soft and non-distended.  There is no tenderness.  No rebound, guarding, or rigidity. Good bowel sounds.  Genitourinary: No CVA tenderness  Musculoskeletal: Moves all extremities. No obvious deformities. No edema. No calf tenderness.  Skin: Warm and dry.  Neurological:  Alert, awake, and appropriate.  Normal speech.  No acute focal neurological deficits are appreciated.  Psychiatric: Normal affect. Good eye contact. Appropriate in content.     ED Course   Critical Care    Date/Time: 5/1/2024 8:14 PM    Performed by: Aurora Ruiz MD  Authorized by: Aurora Ruiz MD  Direct patient critical care time: 16 minutes  Additional history critical care time: 6 minutes  Ordering / reviewing critical care time: 4 minutes  Documentation critical care time: 5 minutes  Consulting other physicians critical care time: 4 minutes  Consult with family critical care time: 3 minutes  Other critical care time: 2 minutes  Total critical care time (exclusive of procedural time) : 40 minutes  Critical care time was exclusive of separately billable procedures and treating other patients and teaching time.  Critical care was necessary to treat or prevent imminent or life-threatening deterioration of the following conditions: melena and upper GI bleed.  Critical care was time spent personally by me on the following activities: blood draw for specimens, development of treatment plan with patient or surrogate, discussions with consultants, interpretation of cardiac output measurements, review of old charts, re-evaluation of patient's condition, pulse  oximetry, ordering and review of radiographic studies, ordering and performing treatments and interventions, ordering and review of laboratory studies, examination of patient, evaluation of patient's response to treatment and obtaining history from patient or surrogate.        ED Vital Signs:  Vitals:    05/01/24 1827 05/01/24 1829 05/01/24 1831 05/01/24 2002   BP:  129/75  121/71   Pulse:  83  72   Resp:  18  19   Temp:   98.5 °F (36.9 °C)    TempSrc:  Oral Oral    SpO2:  100%  100%   Weight: 85.2 kg (187 lb 13.3 oz)          Abnormal Lab Results:  Labs Reviewed   CBC W/ AUTO DIFFERENTIAL - Abnormal; Notable for the following components:       Result Value    RBC 3.08 (*)     Hemoglobin 9.9 (*)     Hematocrit 27.5 (*)     MCH 32.1 (*)     Immature Granulocytes 0.6 (*)     Immature Grans (Abs) 0.05 (*)     All other components within normal limits   COMPREHENSIVE METABOLIC PANEL - Abnormal; Notable for the following components:    BUN 27 (*)     All other components within normal limits   URINALYSIS, REFLEX TO URINE CULTURE - Abnormal; Notable for the following components:    Color, UA Colorless (*)     All other components within normal limits    Narrative:     Specimen Source->Urine   B-TYPE NATRIURETIC PEPTIDE   TROPONIN I   CK        All Lab Results:  Results for orders placed or performed during the hospital encounter of 05/01/24   Brain natriuretic peptide   Result Value Ref Range    BNP <10 0 - 99 pg/mL   CBC auto differential   Result Value Ref Range    WBC 8.74 3.90 - 12.70 K/uL    RBC 3.08 (L) 4.60 - 6.20 M/uL    Hemoglobin 9.9 (L) 14.0 - 18.0 g/dL    Hematocrit 27.5 (L) 40.0 - 54.0 %    MCV 89 82 - 98 fL    MCH 32.1 (H) 27.0 - 31.0 pg    MCHC 36.0 32.0 - 36.0 g/dL    RDW 12.1 11.5 - 14.5 %    Platelets 239 150 - 450 K/uL    MPV 10.0 9.2 - 12.9 fL    Immature Granulocytes 0.6 (H) 0.0 - 0.5 %    Gran # (ANC) 6.3 1.8 - 7.7 K/uL    Immature Grans (Abs) 0.05 (H) 0.00 - 0.04 K/uL    Lymph # 1.6 1.0 - 4.8 K/uL     Mono # 0.7 0.3 - 1.0 K/uL    Eos # 0.0 0.0 - 0.5 K/uL    Baso # 0.05 0.00 - 0.20 K/uL    nRBC 0 0 /100 WBC    Gran % 72.2 38.0 - 73.0 %    Lymph % 18.2 18.0 - 48.0 %    Mono % 8.1 4.0 - 15.0 %    Eosinophil % 0.3 0.0 - 8.0 %    Basophil % 0.6 0.0 - 1.9 %    Differential Method Automated    Comprehensive metabolic panel   Result Value Ref Range    Sodium 137 136 - 145 mmol/L    Potassium 3.7 3.5 - 5.1 mmol/L    Chloride 106 95 - 110 mmol/L    CO2 23 23 - 29 mmol/L    Glucose 102 70 - 110 mg/dL    BUN 27 (H) 6 - 20 mg/dL    Creatinine 1.4 0.5 - 1.4 mg/dL    Calcium 9.8 8.7 - 10.5 mg/dL    Total Protein 6.6 6.0 - 8.4 g/dL    Albumin 4.0 3.5 - 5.2 g/dL    Total Bilirubin 0.4 0.1 - 1.0 mg/dL    Alkaline Phosphatase 60 55 - 135 U/L    AST 18 10 - 40 U/L    ALT 35 10 - 44 U/L    eGFR >60 >60 mL/min/1.73 m^2    Anion Gap 8 8 - 16 mmol/L   Troponin I   Result Value Ref Range    Troponin I 0.015 0.000 - 0.026 ng/mL   CPK   Result Value Ref Range    CPK 62 20 - 200 U/L   Urinalysis, Reflex to Urine Culture Urine, Clean Catch    Specimen: Urine, Clean Catch   Result Value Ref Range    Specimen UA Urine, Clean Catch     Color, UA Colorless (A) Yellow, Straw, Chari    Appearance, UA Clear Clear    pH, UA 6.0 5.0 - 8.0    Specific Gravity, UA 1.010 1.005 - 1.030    Protein, UA Negative Negative    Glucose, UA Negative Negative    Ketones, UA Negative Negative    Bilirubin (UA) Negative Negative    Occult Blood UA Negative Negative    Nitrite, UA Negative Negative    Urobilinogen, UA Negative <2.0 EU/dL    Leukocytes, UA Negative Negative   EKG 12-lead   Result Value Ref Range    QRS Duration 94 ms    [facility] QTC Calculation 408 ms         Imaging Results:  Imaging Results              X-Ray Chest 1 View (Final result)  Result time 05/01/24 19:35:38      Final result by Elpidio Almaraz MD (05/01/24 19:35:38)                   Impression:      No acute abnormality.      Electronically signed by: Elpidio  Rufina  Date:    05/01/2024  Time:    19:35               Narrative:    EXAMINATION:  XR CHEST 1 VIEW    CLINICAL HISTORY:  Dizziness and giddiness    TECHNIQUE:  Single frontal view of the chest was performed.    COMPARISON:  None    FINDINGS:  Overlying support device again noted.  The lungs are clear, with normal appearance of pulmonary vasculature and no pleural effusion or pneumothorax.    The cardiac silhouette is normal in size. The hilar and mediastinal contours are unremarkable.    Bones are intact.                                       The EKG was ordered, reviewed, and independently interpreted by the ED provider.  Interpretation time: 19:12  Rate: 81 BPM  Rhythm: normal sinus rhythm  Interpretation: Rightward axis. T wave abnormality, consider inferolateral ischemia. No STEMI.           The Emergency Provider reviewed the vital signs and test results, which are outlined above.     ED Discussion       6:50 PM: Discussed pt's case with Dr. Swain (Gastroenterology) who came to ED to see patient. Dr. Swain plans EGD in am.    8:52 PM: Discussed case with Dr. Terry Guzmán ([facility]). Dr. Guzmán agrees with current care and management of pt and accepts admission while pt is prepped for EGD.     9:00 PM: Pt is asking to go home. He wants to be scoped tomorrow, but doesn't want to stay in the hospital tonight. He is very anxious. Declines any ativan or relaxation medicine.    9:08 PM: Discussed pt's case with Dr. Swain (Gastroenterology) who states he can arrange for pt to be scoped tomorrow. He asks to please let pt know that he will call him around 8 am. Dr. Swain does not advice pt to go home, but if that is his wish he is ok with it.     9:15 PM: Reassessed pt at this time. Discussed with pt all pertinent ED information and results. Discussed pt dx and plan of tx. Gave pt all f/u and return to the ED instructions. All questions and concerns were addressed at this time. Pt expresses understanding  of information and instructions, and is comfortable with plan to discharge. Pt is stable for discharge.    I discussed with patient and/or family/caretaker that evaluation in the ED does not suggest any emergent or life threatening medical conditions requiring immediate intervention beyond what was provided in the ED, and I believe patient is safe for discharge.  Regardless, an unremarkable evaluation in the ED does not preclude the development or presence of a serious of life threatening condition. As such, patient was instructed to return immediately for any worsening or change in current symptoms.         Medical Decision Making  Amount and/or Complexity of Data Reviewed  Labs: ordered. Decision-making details documented in ED Course.  Radiology: ordered. Decision-making details documented in ED Course.  ECG/medicine tests: ordered and independent interpretation performed. Decision-making details documented in ED Course.    Risk  Prescription drug management.                ED Medication(s):  Medications   pantoprazole injection 40 mg (40 mg Intravenous Given 5/1/24 1928)   ampicillin-sulbactam injection 3 g (3 g Intramuscular Given 5/1/24 2135)       Discharge Medication List as of 5/1/2024  9:31 PM           Follow-up Information       Nelson Swain MD.    Specialty: Gastroenterology  Contact information:  [id number] [facility] [id number] [phone]                                 Scribe Attestation:   Scribe #1: I performed the above scribed service and the documentation accurately describes the services I performed. I attest to the accuracy of the note.     Attending:   Physician Attestation Statement for Scribe #1: I, Aurora Ruiz MD, personally performed the services described in this documentation, as scribed by Segundo Smith, in my presence, and it is both accurate and complete.           Clinical Impression       ICD-10-CM ICD-9-CM   1. Melena  K92.1 578.1   2. Dizziness  R42  780.4   3. Positive blood culture  R78.81 790.7   4. Complaint of melena  K92.1 578.1   5. Acute post-hemorrhagic anemia  D62 285.1   6. Upper GI bleed  K92.2 578.9       Disposition:   Disposition: Discharged  Condition: Stable         Aurora Ruiz MD  05/02/24 7248

## 2024-05-02 NOTE — INTERVAL H&P NOTE
The patient has been examined and the H&P has been reviewed:    I concur with the findings and no changes have occurred since H&P was written.    Procedure risks, benefits and alternative options discussed and understood by patient/family.      Active Hospital Problems    Diagnosis  POA    *Complaint of melena [K92.1]  Yes     Patient reports passing black tarry stools at home x 1.       Acute post-hemorrhagic anemia [D62]  Yes      Resolved Hospital Problems   No resolved problems to display.

## 2024-05-02 NOTE — PROVATION PATIENT INSTRUCTIONS
Discharge Summary/Instructions after an Endoscopic Procedure  Patient Name: Milind Melgar  Patient MRN: [id number]  Patient YOB: 1970  Thursday, May 2, 2024 Nelson Swain MD  Dear patient,  As a result of recent federal legislation (The Federal Cures Act), you may   receive lab or pathology results from your procedure in your [facility]   account before your physician is able to contact you. Your physician or   their representative will relay the results to you with their   recommendations at their soonest availability.  Thank you,  RESTRICTIONS:  During your procedure today, you received medications for sedation.  These   medications may affect your judgment, balance and coordination.  Therefore,   for 24 hours, you have the following restrictions:   - DO NOT drive a car, operate machinery, make legal/financial decisions,   sign important papers or drink alcohol.    ACTIVITY:  Today: no heavy lifting, straining or running due to procedural   sedation/anesthesia.  The following day: return to full activity including work.  DIET:  Eat and drink normally unless instructed otherwise.     TREATMENT FOR COMMON SIDE EFFECTS:  - Mild abdominal pain, nausea, belching, bloating or excessive gas:  rest,   eat lightly and use a heating pad.  - Sore Throat: treat with throat lozenges and/or gargle with warm salt   water.  - Because air was used during the procedure, expelling large amounts of air   from your rectum or belching is normal.  - If a bowel prep was taken, you may not have a bowel movement for 1-3 days.    This is normal.  SYMPTOMS TO WATCH FOR AND REPORT TO YOUR PHYSICIAN:  1. Abdominal pain or bloating, other than gas cramps.  2. Chest pain.  3. Back pain.  4. Signs of infection such as: chills or fever occurring within 24 hours   after the procedure.  5. Rectal bleeding, which would show as bright red, maroon, or black stools.   (A tablespoon of blood from the rectum is not serious, especially if    hemorrhoids are present.)  6. Vomiting.  7. Weakness or dizziness.  GO DIRECTLY TO THE NEAREST EMERGENCY ROOM IF YOU HAVE ANY OF THE FOLLOWING:      Difficulty breathing              Chills and/or fever over 101 F   Persistent vomiting and/or vomiting blood   Severe abdominal pain   Severe chest pain   Black, tarry stools   Bleeding- more than one tablespoon   Any other symptom or condition that you feel may need urgent attention  Your doctor recommends these additional instructions:  If any biopsies were taken, your doctors clinic will contact you in 1 to 2   weeks with any results.  - The patient will be observed post-procedure, until all discharge criteria   are met.   - Discharge patient to home (ambulatory).   - Patient has a contact number available for emergencies.  The signs and   symptoms of potential delayed complications were discussed with the   patient.  Return to normal activities tomorrow.  Written discharge   instructions were provided to the patient.   - Resume previous diet.   - Continue present medications.   - Await pathology results.   - Repeat upper endoscopy in 1 month to check healing.   - Return to referring physician as previously scheduled.  For questions, problems or results please call your physician Nelson Swain MD at Work:  [phone]  If you have any questions about the above instructions, call the GI   department at [phone] or call the endoscopy unit at [phone]   from 7am until 3 pm.  [facility] [place] [facility], EMERGENCY ROOM PHONE NUMBER:   [phone]  IF A COMPLICATION OR EMERGENCY SITUATION ARISES AND YOU ARE UNABLE TO REACH   YOUR PHYSICIAN - GO DIRECTLY TO THE EMERGENCY ROOM.  I have read or have had read to me these discharge instructions for my   procedure and have received a written copy.  I understand these   instructions and will follow-up with my physician if I have any questions.     __________________________________        _____________________________________  Nurse Signature                                          Patient/Designated   Responsible Party Signature  Nelson Swain MD  5/2/2024 10:54:58 AM  This report has been verified and signed electronically.  Dear patient,  As a result of recent federal legislation (The Federal Cures Act), you may   receive lab or pathology results from your procedure in your [facility]   account before your physician is able to contact you. Your physician or   their representative will relay the results to you with their   recommendations at their soonest availability.  Thank you,  PROVATION

## 2024-05-02 NOTE — TRANSFER OF CARE
"Anesthesia Transfer of Care Note    Patient: Milind Melgar    Procedure(s) Performed: Procedure(s) (LRB):  EGD (ESOPHAGOGASTRODUODENOSCOPY) (Left)    Patient location: GI    Anesthesia Type: MAC    Transport from OR: Transported from OR on room air with adequate spontaneous ventilation    Post pain: adequate analgesia    Post assessment: no apparent anesthetic complications and tolerated procedure well    Post vital signs: stable    Level of consciousness: responds to stimulation    Nausea/Vomiting: no nausea/vomiting    Complications: none    Transfer of care protocol was followed      Last vitals: Visit Vitals  BP (!) 90/54 (BP Location: Left arm, Patient Position: Lying)   Pulse 95   Temp 37.1 °C (98.8 °F) (Temporal)   Resp 17   Ht 5' 9" (1.753 m)   SpO2 100%   BMI 27.74 kg/m²     " CHIEF COMPLAINT:   Chief Complaint   Patient presents with   • Other     pap        HPI:  Patient here for serial PAP smear with HPV testing. Patient denies abnormal discharge, abnormal vaginal bleeding or pains. She is sexually active, denies any issues.     ROS:   General/Constitutional Denies.    Urology Denies.   Integumentary Denies.   Women Only Denies.    Genitourinary Denies.    Skin Denies.    Gastrointestinal Denies.    Endocrine Denies.    Psychiatric Denies.    Health Education Admits.         OB/GYN HISTORY:  OB History    Para Term  AB Living   1 0 0 0 0 0   SAB TAB Ectopic Molar Multiple Live Births   0 0 0 0 0 0       Menstrual History:  Patient's last menstrual period was 2021.  Menstrual Tracking  Period Cycle (Days): 28  Period Duration (Days): 7  Period Pattern: Regular  Menstrual Flow: Moderate       HISTORY:  Past Medical History:   Diagnosis Date   • Abnormal finding on mammography    • SUSANNE II (cervical intraepithelial neoplasia II)       Past Surgical History:   Procedure Laterality Date   • Colposcopy,loop electrd cervix excis  10/06/2017   • Hernia repair       Current Outpatient Medications   Medication Sig Dispense Refill   • ketoconazole (NIZORAL) 2 % shampoo USE 5 ML THREE TIMES PER WEEK PRN     • cyanocobalamin (VITAMIN B-12) 100 MCG tablet Take 50 mcg by mouth daily.     • Ascorbic Acid (VITAMIN C) 100 MG tablet Take 100 mg by mouth daily.     • Prenatal Vit-DSS-Fe Fum-FA (PNV FE FUM/DOCUSATE/FOLIC ACID) 29-1 MG Tab Take 1 tablet by mouth daily. 90 tablet 3     No current facility-administered medications for this visit.      ALLERGIES:  No Known Allergies  History reviewed. No pertinent family history.  Social History     Tobacco Use   • Smoking status: Never Smoker   • Smokeless tobacco: Never Used   Substance Use Topics   • Alcohol use: Not Currently   • Drug use: Never        PHYSICAL EXAM:  Visit Vitals  /63   Wt 53.4 kg (117 lb 11.2 oz)   LMP 2021    BMI 17.90 kg/m²     GENERAL APEARANCE:  The patient is a pleasant, normal appearing female with normal affect and in no distress.  NECK: supple.  No cervical lymphadenopathy.  No thyromegaly, no nodules palpated, trachea midline.  LUNGS: normal respiratory effort  HEART:   Regular rate   ABDOMEN: Soft, non-tender, non-distended.  No hepatosplenomegaly.  Normal bowel sounds.  No umbilical or inguinal hernias.  SKIN:  Warm and dry to touch.  No lesions or rashes noted.    PSYCHIATRIC/NEUROLOGIC:  Appropriate mood and affect, normal recall, alert and oriented x 3  EXTREMITIES:  Warm and well perfused.  No edema noted.  Muscle strength and sensation are normal bilaterally 5/5 in both upper and lower extremities.   :  Vulva: Inspection of her external genitalia reveals normal mons pubis, labia minora and labia majora.  Normal appearing     Clitoris, urethral meatus and Brecon's glands.    Bladder:  No evidence of urethral or bladder tenderness.    Vagina:  Speculum exam reveals pink and moist vaginal mucosa.  Bartholin gland is normal to palpation.  Cervix:  Cervix is normal in appearance with no lesions.    Uterus:  Uterus is mobile and non-tender,  No adnexal masses are palpated.  Adnexae are non-tender to palpation  Perineum:  Perineum appears normal. No lesions or masses.    ASSESSMENT:  Problem List Items Addressed This Visit     None      Visit Diagnoses     H/O abnormal cells from cervix    -  Primary    Relevant Orders    PAP ORDER    Screening for malignant neoplasm of the cervix        Relevant Orders    PAP ORDER    Special screening examination for human papillomavirus (HPV)        Relevant Orders    PAP ORDER          PLAN:   1. Pap collected and sent  2. Return for annual visit in 6 months.         Rachel Hargrove CNM  2/3/2021

## 2024-05-03 ENCOUNTER — TELEPHONE (OUTPATIENT)
Dept: CARDIOLOGY | Facility: CLINIC | Age: 54
End: 2024-05-03
Payer: COMMERCIAL

## 2024-05-03 VITALS
RESPIRATION RATE: 17 BRPM | BODY MASS INDEX: 27.74 KG/M2 | HEIGHT: 69 IN | HEART RATE: 89 BPM | SYSTOLIC BLOOD PRESSURE: 120 MMHG | DIASTOLIC BLOOD PRESSURE: 77 MMHG | OXYGEN SATURATION: 99 % | TEMPERATURE: 97 F

## 2024-05-03 LAB
ALBUMIN SERPL-MCNC: 3.7 G/DL (ref 3.6–5.1)
OHS QRS DURATION: 94 MS
OHS QTC CALCULATION: 408 MS
SHBG SERPL-SCNC: 17 NMOL/L (ref 10–50)
TESTOST FREE SERPL-MCNC: 159.3 PG/ML (ref 46–224)
TESTOST SERPL-MCNC: 621 NG/DL (ref 250–1100)
TESTOSTERONE.FREE+WB SERPL-MCNC: 272.1 NG/DL

## 2024-05-03 NOTE — TELEPHONE ENCOUNTER
Pt gets PICC line out this Sunday 5/12 and wishes to have implant 5/14 if possible  Please advise    Aylin Gilman, JACQUELINE-Camelia Montero MA; MONICA Viera Staff  Caller: Unspecified (Today,  2:26 PM)  He needs to be seen prior to ICD implant given recent readmission to get scheduled once antibiotics are completed    Thanks      Pt scheduled for 5/15 but they are hoping to have implant 5/13 so please advise- no openings before 5/15

## 2024-05-03 NOTE — TELEPHONE ENCOUNTER
Spoke to wife she stated that she would like to call back and get scheduled after dr anand does his defibrillator in 2 weeks.

## 2024-05-03 NOTE — TELEPHONE ENCOUNTER
Called to get patient scheduled for appt prior to device implant. No answer, left message to call back and be scheduled. KA    Spoke to wife she stated that she would like to call back and get scheduled after dr anand does his defibrillator in 2 weeks.

## 2024-05-04 LAB
BACTERIA BLD CULT: NORMAL
BACTERIA BLD CULT: NORMAL

## 2024-05-06 ENCOUNTER — LAB VISIT (OUTPATIENT)
Dept: LAB | Facility: HOSPITAL | Age: 54
End: 2024-05-06
Attending: STUDENT IN AN ORGANIZED HEALTH CARE EDUCATION/TRAINING PROGRAM
Payer: COMMERCIAL

## 2024-05-06 ENCOUNTER — TELEPHONE (OUTPATIENT)
Dept: CARDIOLOGY | Facility: CLINIC | Age: 54
End: 2024-05-06
Payer: COMMERCIAL

## 2024-05-06 ENCOUNTER — PATIENT MESSAGE (OUTPATIENT)
Dept: CARDIOLOGY | Facility: CLINIC | Age: 54
End: 2024-05-06
Payer: COMMERCIAL

## 2024-05-06 DIAGNOSIS — R78.81 BACTEREMIA: Primary | ICD-10-CM

## 2024-05-06 DIAGNOSIS — K83.09 BACTERIAL CHOLANGITIS: ICD-10-CM

## 2024-05-06 DIAGNOSIS — B96.89 BACTERIAL CHOLANGITIS: ICD-10-CM

## 2024-05-06 LAB
ALBUMIN SERPL BCP-MCNC: 4.1 G/DL (ref 3.5–5.2)
ALP SERPL-CCNC: 65 U/L (ref 55–135)
ALT SERPL W/O P-5'-P-CCNC: 29 U/L (ref 10–44)
ANION GAP SERPL CALC-SCNC: 7 MMOL/L (ref 8–16)
AST SERPL-CCNC: 18 U/L (ref 10–40)
BASOPHILS # BLD AUTO: 0.03 K/UL (ref 0–0.2)
BASOPHILS NFR BLD: 0.5 % (ref 0–1.9)
BILIRUB SERPL-MCNC: 0.4 MG/DL (ref 0.1–1)
BUN SERPL-MCNC: 18 MG/DL (ref 6–20)
CALCIUM SERPL-MCNC: 10.2 MG/DL (ref 8.7–10.5)
CHLORIDE SERPL-SCNC: 105 MMOL/L (ref 95–110)
CO2 SERPL-SCNC: 26 MMOL/L (ref 23–29)
CREAT SERPL-MCNC: 1.4 MG/DL (ref 0.5–1.4)
DIFFERENTIAL METHOD BLD: ABNORMAL
EOSINOPHIL # BLD AUTO: 0 K/UL (ref 0–0.5)
EOSINOPHIL NFR BLD: 0.7 % (ref 0–8)
ERYTHROCYTE [DISTWIDTH] IN BLOOD BY AUTOMATED COUNT: 12.8 % (ref 11.5–14.5)
EST. GFR  (NO RACE VARIABLE): >60 ML/MIN/1.73 M^2
GLUCOSE SERPL-MCNC: 98 MG/DL (ref 70–110)
HCT VFR BLD AUTO: 28.7 % (ref 40–54)
HGB BLD-MCNC: 10.1 G/DL (ref 14–18)
IMM GRANULOCYTES # BLD AUTO: 0.03 K/UL (ref 0–0.04)
IMM GRANULOCYTES NFR BLD AUTO: 0.5 % (ref 0–0.5)
LYMPHOCYTES # BLD AUTO: 1.2 K/UL (ref 1–4.8)
LYMPHOCYTES NFR BLD: 19.3 % (ref 18–48)
MCH RBC QN AUTO: 31.9 PG (ref 27–31)
MCHC RBC AUTO-ENTMCNC: 35.2 G/DL (ref 32–36)
MCV RBC AUTO: 91 FL (ref 82–98)
MONOCYTES # BLD AUTO: 0.5 K/UL (ref 0.3–1)
MONOCYTES NFR BLD: 7.3 % (ref 4–15)
NEUTROPHILS # BLD AUTO: 4.4 K/UL (ref 1.8–7.7)
NEUTROPHILS NFR BLD: 71.7 % (ref 38–73)
NRBC BLD-RTO: 0 /100 WBC
PLATELET # BLD AUTO: 331 K/UL (ref 150–450)
PLATELET BLD QL SMEAR: ABNORMAL
PMV BLD AUTO: 10 FL (ref 9.2–12.9)
POTASSIUM SERPL-SCNC: 4 MMOL/L (ref 3.5–5.1)
PROT SERPL-MCNC: 6.8 G/DL (ref 6–8.4)
RBC # BLD AUTO: 3.17 M/UL (ref 4.6–6.2)
SODIUM SERPL-SCNC: 138 MMOL/L (ref 136–145)
WBC # BLD AUTO: 6.15 K/UL (ref 3.9–12.7)

## 2024-05-06 PROCEDURE — 36415 COLL VENOUS BLD VENIPUNCTURE: CPT | Performed by: STUDENT IN AN ORGANIZED HEALTH CARE EDUCATION/TRAINING PROGRAM

## 2024-05-06 PROCEDURE — 80053 COMPREHEN METABOLIC PANEL: CPT | Performed by: STUDENT IN AN ORGANIZED HEALTH CARE EDUCATION/TRAINING PROGRAM

## 2024-05-06 PROCEDURE — 85025 COMPLETE CBC W/AUTO DIFF WBC: CPT | Performed by: STUDENT IN AN ORGANIZED HEALTH CARE EDUCATION/TRAINING PROGRAM

## 2024-05-06 NOTE — TELEPHONE ENCOUNTER
Spoke with patient's wife and explained plan of care. Confirmed 5/16/24 for Implant date unless cancellation 5/13      I believe patient may need clearance from Infectious Disease before scheduling ICD implant  Sent portal message that 5/16/24 would be available once Infectious Disease gives complete clearance        ----- Message from Alaina Ford LPN sent at 5/6/2024 11:51 AM CDT -----  Contact: Vidya/ Wife    ----- Message -----  From: Stacy Cabrera  Sent: 5/6/2024  11:45 AM CDT  To: Etelvina ELDER Staff    .Type:  Patient Returning Call    Who Called: Vidya   Who Left Message for Patient: Nurse  Does the patient know what this is regarding?: Surgery appointment  Would the patient rather a call back or a response via [facility]? Call back   Best Call Back Number: [phone]  Additional Information:

## 2024-05-06 NOTE — TELEPHONE ENCOUNTER
Spoke with Joi only needs to see DR Myers      ----- Message from Yu Fuentes sent at 5/6/2024  2:16 PM CDT -----  Regarding: medical advice  Contact: vidya  .Type:  Needs Medical Advice    Who Called: Vidya   Symptoms (please be specific):    How long has patient had these symptoms:    Pharmacy name and phone #:    Would the patient rather a call back or a response via [facility]? call  Best Call Back Number:[phone]   Additional Information:  Brisa is requesting a callback from the nurse today in regard to his appointment before his surgery.

## 2024-05-06 NOTE — TELEPHONE ENCOUNTER
Spoke with Mrs. Melgar and confirmed 5/16/24 ICD implant date/ 8:00AM start time unless there's a cancellation on 5/14.  Confirmed appointment with Dr. Alvarenga on Monday 5/13/24 following Dr. Randall's appointment.

## 2024-05-07 ENCOUNTER — TELEPHONE (OUTPATIENT)
Dept: CARDIOLOGY | Facility: CLINIC | Age: 54
End: 2024-05-07
Payer: COMMERCIAL

## 2024-05-07 LAB
FINAL PATHOLOGIC DIAGNOSIS: NORMAL
GROSS: NORMAL
Lab: NORMAL

## 2024-05-07 NOTE — TELEPHONE ENCOUNTER
Aylin wanted pt to see dr dawn staples appt has been made. pb----- Message from Joi Simons LPN sent at 5/6/2024 12:13 PM CDT -----  Regarding: ICD vs CRT-D s/p Infection  Contact: Vidya/ Wife  Believe he may need clearance from Infectious Disease.  Checking with MD or NP.  ----- Message -----  From: Alaina Ford LPN  Sent: 5/6/2024  11:51 AM CDT  To: Joi Simons LPN      ----- Message -----  From: Stacy Cabrera  Sent: 5/6/2024  11:45 AM CDT  To: Etelvina ELDER Staff    .Type:  Patient Returning Call    Who Called: Vidya   Who Left Message for Patient: Nurse  Does the patient know what this is regarding?: Surgery appointment  Would the patient rather a call back or a response via [facility]? Call back   Best Call Back Number: [phone]  Additional Information:

## 2024-05-08 NOTE — PROGRESS NOTES
Milind,    Biopsies obtained during your procedure are essentially benign. No further action is needed at this point besides continue the acid reducer prescribed. After you complete the cardiac evaluation, we will plan for a repeat endoscopy and also a colonoscopy.     Thanks for using [facility], Aldo J. Russo, M.D.

## 2024-05-09 ENCOUNTER — PATIENT MESSAGE (OUTPATIENT)
Dept: CARDIOLOGY | Facility: CLINIC | Age: 54
End: 2024-05-09
Payer: COMMERCIAL

## 2024-05-10 DIAGNOSIS — I49.01 VENTRICULAR FIBRILLATION: ICD-10-CM

## 2024-05-10 DIAGNOSIS — I46.9 CARDIAC ARREST: Primary | ICD-10-CM

## 2024-05-10 DIAGNOSIS — I21.4 NSTEMI (NON-ST ELEVATED MYOCARDIAL INFARCTION): ICD-10-CM

## 2024-05-10 DIAGNOSIS — I49.01 VF (VENTRICULAR FIBRILLATION): ICD-10-CM

## 2024-05-10 RX ORDER — CEFAZOLIN SODIUM 2 G/50ML
2 SOLUTION INTRAVENOUS
Status: CANCELLED | OUTPATIENT
Start: 2024-05-16

## 2024-05-13 ENCOUNTER — LAB VISIT (OUTPATIENT)
Dept: LAB | Facility: HOSPITAL | Age: 54
End: 2024-05-13
Attending: INTERNAL MEDICINE
Payer: COMMERCIAL

## 2024-05-13 ENCOUNTER — INFUSION (OUTPATIENT)
Dept: INFUSION THERAPY | Facility: HOSPITAL | Age: 54
End: 2024-05-13
Attending: NURSE PRACTITIONER
Payer: COMMERCIAL

## 2024-05-13 ENCOUNTER — OFFICE VISIT (OUTPATIENT)
Dept: INFECTIOUS DISEASES | Facility: CLINIC | Age: 54
End: 2024-05-13
Payer: COMMERCIAL

## 2024-05-13 ENCOUNTER — OFFICE VISIT (OUTPATIENT)
Dept: CARDIOLOGY | Facility: CLINIC | Age: 54
End: 2024-05-13
Payer: COMMERCIAL

## 2024-05-13 VITALS
WEIGHT: 190.25 LBS | HEIGHT: 69 IN | BODY MASS INDEX: 28.18 KG/M2 | SYSTOLIC BLOOD PRESSURE: 116 MMHG | RESPIRATION RATE: 18 BRPM | DIASTOLIC BLOOD PRESSURE: 84 MMHG

## 2024-05-13 VITALS
OXYGEN SATURATION: 98 % | DIASTOLIC BLOOD PRESSURE: 75 MMHG | SYSTOLIC BLOOD PRESSURE: 126 MMHG | TEMPERATURE: 98 F | HEART RATE: 71 BPM | RESPIRATION RATE: 18 BRPM

## 2024-05-13 VITALS
HEART RATE: 88 BPM | WEIGHT: 190.94 LBS | DIASTOLIC BLOOD PRESSURE: 76 MMHG | BODY MASS INDEX: 28.19 KG/M2 | SYSTOLIC BLOOD PRESSURE: 122 MMHG | OXYGEN SATURATION: 100 %

## 2024-05-13 DIAGNOSIS — R78.81 POSITIVE BLOOD CULTURE: Primary | ICD-10-CM

## 2024-05-13 DIAGNOSIS — I49.01 VENTRICULAR FIBRILLATION: ICD-10-CM

## 2024-05-13 DIAGNOSIS — K72.00 SHOCK LIVER: ICD-10-CM

## 2024-05-13 DIAGNOSIS — M79.604 LEG PAIN, BILATERAL: ICD-10-CM

## 2024-05-13 DIAGNOSIS — I10 HYPERTENSION, UNSPECIFIED TYPE: Chronic | ICD-10-CM

## 2024-05-13 DIAGNOSIS — I46.9 CARDIAC ARREST: ICD-10-CM

## 2024-05-13 DIAGNOSIS — K25.3 ACUTE GASTRIC ULCER WITHOUT HEMORRHAGE OR PERFORATION: ICD-10-CM

## 2024-05-13 DIAGNOSIS — E87.20 LACTIC ACIDOSIS: ICD-10-CM

## 2024-05-13 DIAGNOSIS — M79.605 LEG PAIN, BILATERAL: ICD-10-CM

## 2024-05-13 DIAGNOSIS — E78.5 HYPERLIPIDEMIA, UNSPECIFIED HYPERLIPIDEMIA TYPE: Chronic | ICD-10-CM

## 2024-05-13 DIAGNOSIS — R78.81 POSITIVE BLOOD CULTURE: ICD-10-CM

## 2024-05-13 DIAGNOSIS — D62 ACUTE POST-HEMORRHAGIC ANEMIA: ICD-10-CM

## 2024-05-13 DIAGNOSIS — I21.4 NSTEMI (NON-ST ELEVATED MYOCARDIAL INFARCTION): ICD-10-CM

## 2024-05-13 DIAGNOSIS — I49.01 VENTRICULAR FIBRILLATION: Primary | ICD-10-CM

## 2024-05-13 DIAGNOSIS — Z86.74 HISTORY OF CARDIAC ARREST: ICD-10-CM

## 2024-05-13 DIAGNOSIS — Z86.74 HISTORY OF CARDIAC ARREST: Primary | ICD-10-CM

## 2024-05-13 LAB
ALBUMIN SERPL BCP-MCNC: 4.3 G/DL (ref 3.5–5.2)
ALP SERPL-CCNC: 80 U/L (ref 55–135)
ALT SERPL W/O P-5'-P-CCNC: 25 U/L (ref 10–44)
ANION GAP SERPL CALC-SCNC: 10 MMOL/L (ref 8–16)
AST SERPL-CCNC: 23 U/L (ref 10–40)
BASOPHILS # BLD AUTO: 0.04 K/UL (ref 0–0.2)
BASOPHILS NFR BLD: 0.8 % (ref 0–1.9)
BILIRUB SERPL-MCNC: 0.4 MG/DL (ref 0.1–1)
BUN SERPL-MCNC: 16 MG/DL (ref 6–20)
CALCIUM SERPL-MCNC: 10.7 MG/DL (ref 8.7–10.5)
CHLORIDE SERPL-SCNC: 106 MMOL/L (ref 95–110)
CO2 SERPL-SCNC: 28 MMOL/L (ref 23–29)
CREAT SERPL-MCNC: 1.3 MG/DL (ref 0.5–1.4)
DIFFERENTIAL METHOD BLD: ABNORMAL
EOSINOPHIL # BLD AUTO: 0 K/UL (ref 0–0.5)
EOSINOPHIL NFR BLD: 0.2 % (ref 0–8)
ERYTHROCYTE [DISTWIDTH] IN BLOOD BY AUTOMATED COUNT: 12.8 % (ref 11.5–14.5)
EST. GFR  (NO RACE VARIABLE): >60 ML/MIN/1.73 M^2
GLUCOSE SERPL-MCNC: 87 MG/DL (ref 70–110)
HCT VFR BLD AUTO: 34.2 % (ref 40–54)
HGB BLD-MCNC: 11.3 G/DL (ref 14–18)
IMM GRANULOCYTES # BLD AUTO: 0.01 K/UL (ref 0–0.04)
IMM GRANULOCYTES NFR BLD AUTO: 0.2 % (ref 0–0.5)
LYMPHOCYTES # BLD AUTO: 0.9 K/UL (ref 1–4.8)
LYMPHOCYTES NFR BLD: 19.3 % (ref 18–48)
MCH RBC QN AUTO: 31.4 PG (ref 27–31)
MCHC RBC AUTO-ENTMCNC: 33 G/DL (ref 32–36)
MCV RBC AUTO: 95 FL (ref 82–98)
MONOCYTES # BLD AUTO: 0.4 K/UL (ref 0.3–1)
MONOCYTES NFR BLD: 8.3 % (ref 4–15)
NEUTROPHILS # BLD AUTO: 3.4 K/UL (ref 1.8–7.7)
NEUTROPHILS NFR BLD: 71.2 % (ref 38–73)
NRBC BLD-RTO: 0 /100 WBC
PLATELET # BLD AUTO: 368 K/UL (ref 150–450)
PMV BLD AUTO: 10.1 FL (ref 9.2–12.9)
POTASSIUM SERPL-SCNC: 4.7 MMOL/L (ref 3.5–5.1)
PROT SERPL-MCNC: 7.8 G/DL (ref 6–8.4)
RBC # BLD AUTO: 3.6 M/UL (ref 4.6–6.2)
SODIUM SERPL-SCNC: 144 MMOL/L (ref 136–145)
WBC # BLD AUTO: 4.71 K/UL (ref 3.9–12.7)

## 2024-05-13 PROCEDURE — 3008F BODY MASS INDEX DOCD: CPT | Mod: CPTII,S$GLB,, | Performed by: STUDENT IN AN ORGANIZED HEALTH CARE EDUCATION/TRAINING PROGRAM

## 2024-05-13 PROCEDURE — 3044F HG A1C LEVEL LT 7.0%: CPT | Mod: CPTII,S$GLB,, | Performed by: STUDENT IN AN ORGANIZED HEALTH CARE EDUCATION/TRAINING PROGRAM

## 2024-05-13 PROCEDURE — 1159F MED LIST DOCD IN RCRD: CPT | Mod: CPTII,S$GLB,, | Performed by: INTERNAL MEDICINE

## 2024-05-13 PROCEDURE — 85610 PROTHROMBIN TIME: CPT | Performed by: INTERNAL MEDICINE

## 2024-05-13 PROCEDURE — 85025 COMPLETE CBC W/AUTO DIFF WBC: CPT | Performed by: INTERNAL MEDICINE

## 2024-05-13 PROCEDURE — 99999 PR PBB SHADOW E&M-EST. PATIENT-LVL III: CPT | Mod: PBBFAC,,, | Performed by: STUDENT IN AN ORGANIZED HEALTH CARE EDUCATION/TRAINING PROGRAM

## 2024-05-13 PROCEDURE — 3008F BODY MASS INDEX DOCD: CPT | Mod: CPTII,S$GLB,, | Performed by: INTERNAL MEDICINE

## 2024-05-13 PROCEDURE — 99999 PR PBB SHADOW E&M-EST. PATIENT-LVL III: CPT | Mod: PBBFAC,,, | Performed by: INTERNAL MEDICINE

## 2024-05-13 PROCEDURE — 3074F SYST BP LT 130 MM HG: CPT | Mod: CPTII,S$GLB,, | Performed by: STUDENT IN AN ORGANIZED HEALTH CARE EDUCATION/TRAINING PROGRAM

## 2024-05-13 PROCEDURE — 3079F DIAST BP 80-89 MM HG: CPT | Mod: CPTII,S$GLB,, | Performed by: STUDENT IN AN ORGANIZED HEALTH CARE EDUCATION/TRAINING PROGRAM

## 2024-05-13 PROCEDURE — 80053 COMPREHEN METABOLIC PANEL: CPT | Performed by: INTERNAL MEDICINE

## 2024-05-13 PROCEDURE — 3074F SYST BP LT 130 MM HG: CPT | Mod: CPTII,S$GLB,, | Performed by: INTERNAL MEDICINE

## 2024-05-13 PROCEDURE — 1160F RVW MEDS BY RX/DR IN RCRD: CPT | Mod: CPTII,S$GLB,, | Performed by: INTERNAL MEDICINE

## 2024-05-13 PROCEDURE — 85730 THROMBOPLASTIN TIME PARTIAL: CPT | Performed by: INTERNAL MEDICINE

## 2024-05-13 PROCEDURE — 3078F DIAST BP <80 MM HG: CPT | Mod: CPTII,S$GLB,, | Performed by: INTERNAL MEDICINE

## 2024-05-13 PROCEDURE — 99215 OFFICE O/P EST HI 40 MIN: CPT | Mod: S$GLB,,, | Performed by: INTERNAL MEDICINE

## 2024-05-13 PROCEDURE — 99213 OFFICE O/P EST LOW 20 MIN: CPT | Mod: S$GLB,,, | Performed by: STUDENT IN AN ORGANIZED HEALTH CARE EDUCATION/TRAINING PROGRAM

## 2024-05-13 PROCEDURE — 36415 COLL VENOUS BLD VENIPUNCTURE: CPT | Performed by: INTERNAL MEDICINE

## 2024-05-13 PROCEDURE — 3044F HG A1C LEVEL LT 7.0%: CPT | Mod: CPTII,S$GLB,, | Performed by: INTERNAL MEDICINE

## 2024-05-13 PROCEDURE — 1111F DSCHRG MED/CURRENT MED MERGE: CPT | Mod: CPTII,S$GLB,, | Performed by: INTERNAL MEDICINE

## 2024-05-13 RX ORDER — MECLIZINE HYDROCHLORIDE 25 MG/1
25 TABLET ORAL NIGHTLY
COMMUNITY
Start: 2024-03-21

## 2024-05-13 RX ORDER — ASPIRIN 325 MG
50000 TABLET, DELAYED RELEASE (ENTERIC COATED) ORAL
COMMUNITY
Start: 2023-07-22

## 2024-05-13 RX ORDER — ROSUVASTATIN CALCIUM 10 MG/1
10 TABLET, COATED ORAL
COMMUNITY

## 2024-05-13 NOTE — PROGRESS NOTES
Infectious Disease Clinic Note    Patient Name: Milind Melgar  YOB: 1970    PRESENTING HISTORY       History of Present Illness:  Mr. Milind Melgar is a 53 y.o. male w/ significant PMHx of hypertension, hyperlipidemia, diverticulitis, palpitations due to PVC's, low testosterone-on therapy, who presented to the ER on 4/23 via EMS after suffering cardiac arrest at home. Per family, patient was found collapsed, unresponsive, pulseless, and apneic. Patient today tells me he must have been down at least 5 minutes before revival. During previous admission, he was extubated on 04/24/2024. Underwent LHC on 04/24 which showed nonobstructive CAD. EP was consulted and planned for AICD placement 04/30/2024. In the interim, cardiology recommended lifevest and return as outpatient to have AICD placed. Transferred to medical floor and discharged. Then on 4/29, hospitalist called to notify patient of positive blood cultures collected on 4/23. Preliminary culture data showed GPR on gram stain with negative BCID panel. Has now finalized as Cutibacterium acnes. Patient had received 3 days of pip/tazo last admission and now one day of amp/sulbactam. Has been afebrile with no leukocytosis. ID consulted for bacteremia. Two bottles have grown Cutibacterium acnes. Micro lab unable to run susceptibilities. Typically S to penicillin and cephalosporins. Source unclear. Common bacterium of oral and GI/ tract. Due to unclear source and plans for ICD placement, recommend 14 day course of IV ceftriaxone 2 g daily.     5/13: Here for hospital follow up. Has completed 2 week course of ceftriaxone. Reports feeling weak prior to EGD. Noted low hemoglobin. Had dark stools. Now with more energy since ulcers were managed. Has good appetite. Sleeping better.  No complications with PICC. Ready for removal. Clear for ICD placement from ID standpoint.      Review of Systems:  Constitutional: no fever or chills  Eyes: no  visual changes  ENT: no nasal congestion or sore throat  Respiratory: no cough or shortness of breath  Cardiovascular: no chest pain  Gastrointestinal: no nausea or vomiting, no abdominal pain, no constipation, no diarrhea  Genitourinary: no hematuria or dysuria  Musculoskeletal: no arthralgias or myalgias  Skin: no rash  Neurological: no headaches, numbness, or paresthesias    The following portions of the patient's history were reviewed and updated as appropriate: allergies, current medications, past family history, past medical history, past social history, past surgical history, and problem list.    PAST HISTORY:     There is no immunization history for the selected administration types on file for this patient.    Past Medical History:   Diagnosis Date    Diverticulitis     Hyperlipidemia     Hypertension     On mechanically assisted ventilation 04/24/2024    PVC's (premature ventricular contractions)        Past Surgical History:   Procedure Laterality Date    CORONARY ANGIOGRAPHY N/A 4/24/2024    Procedure: ANGIOGRAM, CORONARY ARTERY;  Surgeon: Fitz Ochoa MD;  Location: [facility] CATH LAB;  Service: Cardiology;  Laterality: N/A;    ESOPHAGOGASTRODUODENOSCOPY Left 5/2/2024    Procedure: EGD (ESOPHAGOGASTRODUODENOSCOPY);  Surgeon: Nelson Swain MD;  Location: [facility] ENDO;  Service: Endoscopy;  Laterality: Left;    LEFT HEART CATHETERIZATION Left 4/24/2024    Procedure: Left heart cath;  Surgeon: Fitz Ochoa MD;  Location: [facility] CATH LAB;  Service: Cardiology;  Laterality: Left;    VENTRICULOGRAM, LEFT  4/24/2024    Procedure: Ventriculogram, Left;  Surgeon: Fitz Ochoa MD;  Location: [facility] CATH LAB;  Service: Cardiology;;       No family history on file.    Social History     Socioeconomic History    Marital status:    Tobacco Use    Smoking status: Never    Smokeless tobacco: Never    Tobacco comments:     Past vaping    Substance and Sexual Activity    Alcohol use: Yes     Comment: rare      Social Determinants of Health     Financial Resource Strain: Low Risk  (5/12/2024)    Overall Financial Resource Strain (CARDIA)     Difficulty of Paying Living Expenses: Not hard at all   Food Insecurity: No Food Insecurity (5/12/2024)    Hunger Vital Sign     Worried About Running Out of Food in the Last Year: Never true     Ran Out of Food in the Last Year: Never true   Transportation Needs: No Transportation Needs (5/12/2024)    PRAPARE - Transportation     Lack of Transportation (Medical): No     Lack of Transportation (Non-Medical): No   Physical Activity: Sufficiently Active (5/12/2024)    Exercise Vital Sign     Days of Exercise per Week: 4 days     Minutes of Exercise per Session: 40 min   Stress: Stress Concern Present (5/12/2024)    Citizen of [place] [employer] - Occupational Stress Questionnaire     Feeling of Stress : Very much   Housing Stability: Low Risk  (5/12/2024)    Housing Stability Vital Sign     Unable to Pay for Housing in the Last Year: No     Homeless in the Last Year: No       MEDICATIONS & ALLERGIES:     Current Outpatient Medications on File Prior to Visit   Medication Sig    ALPRAZolam (XANAX) 0.25 MG tablet Take 0.25 mg by mouth 3 (three) times daily as needed for Anxiety.    aspirin 81 MG Chew Take 1 tablet (81 mg total) by mouth once daily.    cholecalciferol, vitamin D3, 1,250 mcg (50,000 unit) capsule Take 50,000 Units by mouth.    dextrose 5 % in water (D5W) PgBk 100 mL with cefTRIAXone 2 gram SolR 2 g Inject 2 g into the vein once daily. for 14 days    metoprolol succinate (TOPROL-XL) 25 MG 24 hr tablet Take 1 tablet (25 mg total) by mouth once daily.    miSOPROStoL (CYTOTEC) 200 MCG Tab Take 1 tablet (200 mcg total) by mouth every 6 (six) hours.    pantoprazole (PROTONIX) 40 MG tablet Take 1 tablet (40 mg total) by mouth once daily.    testosterone cypionate (DEPOTESTOTERONE CYPIONATE) 200 mg/mL injection Inject 200 mg into the muscle every 14 (fourteen) days.     "meclizine (ANTIVERT) 25 mg tablet Take 25 mg by mouth every evening. (Patient not taking: Reported on 5/13/2024)    rosuvastatin (CRESTOR) 10 MG tablet Take 10 mg by mouth. (Patient not taking: Reported on 5/13/2024)     No current facility-administered medications on file prior to visit.       Review of patient's allergies indicates:  No Known Allergies    OBJECTIVE:   Vital Signs:  Vitals:    05/13/24 0828   BP: 116/84   Pulse: (P) 82   Resp: 18   Weight: 86.3 kg (190 lb 4.1 oz)   Height: 5' 9" (1.753 m)       No results found for this or any previous visit (from the past 24 hour(s)).      Physical Exam:   General:  Well developed, well nourished, no acute distress  HEENT:  Normocephalic, atraumatic  CVS:  RRR, S1 and S2 normal, no murmurs, rubs, gallops  Resp:  Lungs clear to auscultation, no wheezes, rales, rhonchi  MSK:  No muscle atrophy, peripheral edema, full range of motion  Skin:  No rashes, ulcers, erythema; left sided PICC clean and intact   Psych:  Alert and oriented to person, place, and time    ASSESSMENT:     Hypertension  Continue current medications and follow with PCP      Ventricular fibrillation  Planning for ICD placement this week. Clear to proceed from ID standpoint.      Positive blood culture  --Two bottles have grown Cutibacterium acnes   --Micro lab unable to run susceptibilities   --Typically S to penicillin and cephalosporins   --Source unclear   --Common bacterium of oral and GI/ tract   --Due to unclear source and plans for ICD placement, have completed a 14 day course of IV ceftriaxone 2 g daily   --Repeat blood cx 4/29 finalized no growth   --Patient is clear for ICD placement from ID standpoint  --PICC removal today   --Follow up with ID as needed       PLAN:     Diagnoses and all orders for this visit:    Positive blood culture  -     PICC line removal    Hypertension, unspecified type    History of cardiac arrest    Ventricular fibrillation        The total time for evaluation " and management services performed on 5/13/24 was greater than 25 minutes.        Cristopher Randall, DO   Infectious Diseases

## 2024-05-13 NOTE — NURSING
PICC Removal Note:  PICC line removed from left antecubital after sterile site prepped per protocol. PICC catheter tip visualized and intact. Pressure dressing applied with coban tape.No redness, ecchymosis, edema, swelling, or drainage noted at site. Instructions provided on post PICC discharge care, including followup notification instructions. Pt remained in clinic for 30 min observation  
denies pain/discomfort

## 2024-05-14 LAB
APTT PPP: 32.8 SEC (ref 21–32)
INR PPP: 1 (ref 0.8–1.2)
PROTHROMBIN TIME: 10.8 SEC (ref 9–12.5)

## 2024-05-16 ENCOUNTER — ANESTHESIA (OUTPATIENT)
Dept: CARDIOLOGY | Facility: HOSPITAL | Age: 54
End: 2024-05-16
Payer: COMMERCIAL

## 2024-05-16 ENCOUNTER — HOSPITAL ENCOUNTER (OUTPATIENT)
Facility: HOSPITAL | Age: 54
Discharge: HOME | End: 2024-05-16
Attending: INTERNAL MEDICINE | Admitting: INTERNAL MEDICINE
Payer: COMMERCIAL

## 2024-05-16 ENCOUNTER — ANESTHESIA EVENT (OUTPATIENT)
Dept: CARDIOLOGY | Facility: HOSPITAL | Age: 54
End: 2024-05-16
Payer: COMMERCIAL

## 2024-05-16 DIAGNOSIS — I49.01 VF (VENTRICULAR FIBRILLATION): ICD-10-CM

## 2024-05-16 DIAGNOSIS — I49.9 ARRHYTHMIA: ICD-10-CM

## 2024-05-16 DIAGNOSIS — I49.01 VENTRICULAR FIBRILLATION: ICD-10-CM

## 2024-05-16 DIAGNOSIS — Z86.74 HISTORY OF CARDIAC ARREST: Primary | ICD-10-CM

## 2024-05-16 DIAGNOSIS — I46.9 CARDIAC ARREST: ICD-10-CM

## 2024-05-16 PROCEDURE — 93010 ELECTROCARDIOGRAM REPORT: CPT | Mod: ,,, | Performed by: INTERNAL MEDICINE

## 2024-05-16 PROCEDURE — 63600175 PHARM REV CODE 636 W HCPCS: Performed by: INTERNAL MEDICINE

## 2024-05-16 PROCEDURE — C1722 AICD, SINGLE CHAMBER: HCPCS | Performed by: INTERNAL MEDICINE

## 2024-05-16 PROCEDURE — 25000003 PHARM REV CODE 250: Performed by: NURSE ANESTHETIST, CERTIFIED REGISTERED

## 2024-05-16 PROCEDURE — 37000008 HC ANESTHESIA 1ST 15 MINUTES: Performed by: INTERNAL MEDICINE

## 2024-05-16 PROCEDURE — 99499 UNLISTED E&M SERVICE: CPT | Mod: ,,, | Performed by: INTERNAL MEDICINE

## 2024-05-16 PROCEDURE — 37000009 HC ANESTHESIA EA ADD 15 MINS: Performed by: INTERNAL MEDICINE

## 2024-05-16 PROCEDURE — C1777 LEAD, AICD, ENDO SINGLE COIL: HCPCS | Performed by: INTERNAL MEDICINE

## 2024-05-16 PROCEDURE — C1894 INTRO/SHEATH, NON-LASER: HCPCS | Performed by: INTERNAL MEDICINE

## 2024-05-16 PROCEDURE — 33249 INSJ/RPLCMT DEFIB W/LEAD(S): CPT | Performed by: INTERNAL MEDICINE

## 2024-05-16 PROCEDURE — 27201423 OPTIME MED/SURG SUP & DEVICES STERILE SUPPLY: Performed by: INTERNAL MEDICINE

## 2024-05-16 PROCEDURE — 63600175 PHARM REV CODE 636 W HCPCS: Performed by: NURSE ANESTHETIST, CERTIFIED REGISTERED

## 2024-05-16 PROCEDURE — 33249 INSJ/RPLCMT DEFIB W/LEAD(S): CPT | Mod: ,,, | Performed by: INTERNAL MEDICINE

## 2024-05-16 PROCEDURE — 93005 ELECTROCARDIOGRAM TRACING: CPT | Mod: 59

## 2024-05-16 PROCEDURE — 25000003 PHARM REV CODE 250: Performed by: INTERNAL MEDICINE

## 2024-05-16 DEVICE — IMPLANTABLE DEVICE
Type: IMPLANTABLE DEVICE | Site: CHEST | Status: FUNCTIONAL
Brand: ACTICOR 7 VR-T DX

## 2024-05-16 DEVICE — IMPLANTABLE DEVICE
Type: IMPLANTABLE DEVICE | Site: HEART | Status: FUNCTIONAL
Brand: PAMIRA

## 2024-05-16 RX ORDER — KETOROLAC TROMETHAMINE 10 MG/1
10 TABLET, FILM COATED ORAL EVERY 6 HOURS
Qty: 20 TABLET | Refills: 0 | Status: SHIPPED | OUTPATIENT
Start: 2024-05-16 | End: 2024-05-21

## 2024-05-16 RX ORDER — LIDOCAINE HYDROCHLORIDE 20 MG/ML
INJECTION, SOLUTION EPIDURAL; INFILTRATION; INTRACAUDAL; PERINEURAL
Status: DISCONTINUED | OUTPATIENT
Start: 2024-05-16 | End: 2024-05-16 | Stop reason: HOSPADM

## 2024-05-16 RX ORDER — VANCOMYCIN HYDROCHLORIDE 1 G/20ML
INJECTION, POWDER, LYOPHILIZED, FOR SOLUTION INTRAVENOUS
Status: DISCONTINUED | OUTPATIENT
Start: 2024-05-16 | End: 2024-05-16 | Stop reason: HOSPADM

## 2024-05-16 RX ORDER — BUPIVACAINE HYDROCHLORIDE 5 MG/ML
INJECTION, SOLUTION EPIDURAL; INTRACAUDAL
Status: DISCONTINUED | OUTPATIENT
Start: 2024-05-16 | End: 2024-05-16 | Stop reason: HOSPADM

## 2024-05-16 RX ORDER — CEFADROXIL 500 MG/1
CAPSULE ORAL
Qty: 6 CAPSULE | Refills: 0 | Status: SHIPPED | OUTPATIENT
Start: 2024-05-16

## 2024-05-16 RX ORDER — PROPOFOL 10 MG/ML
VIAL (ML) INTRAVENOUS
Status: DISCONTINUED | OUTPATIENT
Start: 2024-05-16 | End: 2024-05-16

## 2024-05-16 RX ORDER — ONDANSETRON HYDROCHLORIDE 2 MG/ML
INJECTION, SOLUTION INTRAVENOUS
Status: DISCONTINUED | OUTPATIENT
Start: 2024-05-16 | End: 2024-05-16

## 2024-05-16 RX ORDER — LIDOCAINE HYDROCHLORIDE 20 MG/ML
INJECTION INTRAVENOUS
Status: DISCONTINUED | OUTPATIENT
Start: 2024-05-16 | End: 2024-05-16

## 2024-05-16 RX ORDER — MIDAZOLAM HYDROCHLORIDE 1 MG/ML
INJECTION INTRAMUSCULAR; INTRAVENOUS
Status: DISCONTINUED | OUTPATIENT
Start: 2024-05-16 | End: 2024-05-16

## 2024-05-16 RX ORDER — PROPOFOL 10 MG/ML
VIAL (ML) INTRAVENOUS CONTINUOUS PRN
Status: DISCONTINUED | OUTPATIENT
Start: 2024-05-16 | End: 2024-05-16

## 2024-05-16 RX ORDER — HYDROCODONE BITARTRATE AND ACETAMINOPHEN 5; 325 MG/1; MG/1
1 TABLET ORAL EVERY 4 HOURS PRN
Status: DISCONTINUED | OUTPATIENT
Start: 2024-05-16 | End: 2024-05-16 | Stop reason: HOSPADM

## 2024-05-16 RX ORDER — FENTANYL CITRATE 50 UG/ML
INJECTION, SOLUTION INTRAMUSCULAR; INTRAVENOUS
Status: DISCONTINUED | OUTPATIENT
Start: 2024-05-16 | End: 2024-05-16

## 2024-05-16 RX ORDER — ACETAMINOPHEN 325 MG/1
650 TABLET ORAL EVERY 4 HOURS PRN
Status: DISCONTINUED | OUTPATIENT
Start: 2024-05-16 | End: 2024-05-16 | Stop reason: HOSPADM

## 2024-05-16 RX ORDER — HYDROCODONE BITARTRATE AND ACETAMINOPHEN 10; 325 MG/1; MG/1
1 TABLET ORAL EVERY 4 HOURS PRN
Status: DISCONTINUED | OUTPATIENT
Start: 2024-05-16 | End: 2024-05-16 | Stop reason: HOSPADM

## 2024-05-16 RX ORDER — CEFAZOLIN SODIUM 2 G/50ML
2 SOLUTION INTRAVENOUS
Status: DISCONTINUED | OUTPATIENT
Start: 2024-05-16 | End: 2024-05-16 | Stop reason: HOSPADM

## 2024-05-16 RX ORDER — ONDANSETRON 8 MG/1
8 TABLET, ORALLY DISINTEGRATING ORAL ONCE
Status: COMPLETED | OUTPATIENT
Start: 2024-05-16 | End: 2024-05-16

## 2024-05-16 RX ORDER — CEFAZOLIN SODIUM 1 G/3ML
INJECTION, POWDER, FOR SOLUTION INTRAMUSCULAR; INTRAVENOUS
Status: DISCONTINUED | OUTPATIENT
Start: 2024-05-16 | End: 2024-05-16

## 2024-05-16 RX ADMIN — FENTANYL CITRATE 25 MCG: 50 INJECTION, SOLUTION INTRAMUSCULAR; INTRAVENOUS at 09:05

## 2024-05-16 RX ADMIN — SODIUM CHLORIDE: 9 INJECTION, SOLUTION INTRAVENOUS at 08:05

## 2024-05-16 RX ADMIN — MIDAZOLAM HYDROCHLORIDE 1 MG: 1 INJECTION, SOLUTION INTRAMUSCULAR; INTRAVENOUS at 08:05

## 2024-05-16 RX ADMIN — LIDOCAINE HYDROCHLORIDE 100 MG: 20 INJECTION INTRAVENOUS at 08:05

## 2024-05-16 RX ADMIN — HYDROCODONE BITARTRATE AND ACETAMINOPHEN 1 TABLET: 10; 325 TABLET ORAL at 10:05

## 2024-05-16 RX ADMIN — PROPOFOL 50 MG: 10 INJECTION, EMULSION INTRAVENOUS at 08:05

## 2024-05-16 RX ADMIN — CEFAZOLIN 2 G: 330 INJECTION, POWDER, FOR SOLUTION INTRAMUSCULAR; INTRAVENOUS at 08:05

## 2024-05-16 RX ADMIN — ONDANSETRON 4 MG: 2 INJECTION INTRAMUSCULAR; INTRAVENOUS at 08:05

## 2024-05-16 RX ADMIN — ONDANSETRON 8 MG: 8 TABLET, ORALLY DISINTEGRATING ORAL at 10:05

## 2024-05-16 RX ADMIN — PROPOFOL 100 MCG/KG/MIN: 10 INJECTION, EMULSION INTRAVENOUS at 08:05

## 2024-05-16 RX ADMIN — FENTANYL CITRATE 25 MCG: 50 INJECTION, SOLUTION INTRAMUSCULAR; INTRAVENOUS at 08:05

## 2024-05-16 NOTE — ANESTHESIA PREPROCEDURE EVALUATION
05/16/2024  Milind Melgar is a 53 y.o., male.      Pre-op Assessment    I have reviewed the Patient Summary Reports.     I have reviewed the Nursing Notes. I have reviewed the NPO Status.   I have reviewed the Medications.     Review of Systems  Anesthesia Hx:  No problems with previous Anesthesia             Denies Family Hx of Anesthesia complications.    Denies Personal Hx of Anesthesia complications.                    Social:  Non-Smoker, Social Alcohol Use, Recreational Drugs Marijuana vape      Hematology/Oncology:  Hematology Normal   Oncology Normal                                   Cardiovascular:  Exercise tolerance: good   Hypertension  Past MI  Denies CAD.    Denies CABG/stent. Dysrhythmias    Denies CHF.    hyperlipidemia   ECG has been reviewed. History of cardiac arrest    EKG 5/2024:  Normal sinus rhythm 81  Rightward axis   T wave abnormality, consider inferolateral ischemia   Abnormal ECG   When compared with ECG of 24-APR-2024 05:38,   T wave inversion now evident in Inferior leads   T wave inversion now evident in Lateral leads    Echo  4/2024:  ·  Left Ventricle: The left ventricle is normal in size. Normal wall thickness. There is concentric remodeling. There is normal systolic function with a visually estimated ejection fraction of 60 - 65%. Grade II diastolic dysfunction. Elevated left ventricular filling pressure.  ·  Right Ventricle: Right ventricle was not well visualized due to poor acoustic window. Right ventricle wall motion  is unable to be assessed. Systolic function could not be assesed.  ·  Aortic Valve: Aortic valve peak velocity is 1.72 m/s. Mean gradient is 7 mmHg.  ·  Mitral Valve: There is mild regurgitation.  ·  IVC/SVC: Patient is ventilated, cannot use IVC diameter to estimate right atrial pressure.                                Pulmonary:    Denies COPD.   Denies Asthma.     Denies Sleep Apnea.                Renal/:  Renal/ Normal                 Hepatic/GI:   PUD,   Denies GERD. Denies Liver Disease.  Denies Hepatitis.           Musculoskeletal:  Musculoskeletal Normal                Neurological:    Denies CVA.    Denies Seizures.                                Endocrine:  Denies Diabetes. Denies Hypothyroidism.  Denies Hyperthyroidism.           Physical Exam    Airway:  Mallampati: II   Mouth Opening: Normal    Dental:  Intact    Chest/Lungs:  Clear to auscultation, Normal Respiratory Rate    Heart:  Rate: Normal  Rhythm: Regular Rhythm    Anesthesia Plan  Type of Anesthesia, risks & benefits discussed:    Anesthesia Type: MAC, Gen ETT, Gen Supraglottic Airway, Gen Natural Airway  Intra-op Monitoring Plan: Standard ASA Monitors  Post Op Pain Control Plan: multimodal analgesia  Induction:  IV  Airway Plan: Direct, Post-Induction  Informed Consent: Informed consent signed with the Patient and all parties understand the risks and agree with anesthesia plan.  All questions answered.   ASA Score: 3  Day of Surgery Review of History & Physical: H&P Update referred to the surgeon/provider.    Ready For Surgery From Anesthesia Perspective.   .

## 2024-05-16 NOTE — TRANSFER OF CARE
"Anesthesia Transfer of Care Note    Patient: Milind Melgar    Procedure(s) Performed: Procedure(s) (LRB):  Insertion, ICD, Dual Chamber/ biotronik (Left)    Patient location: Cath Lab    Anesthesia Type: MAC    Transport from OR: Transported from OR on room air with adequate spontaneous ventilation    Post pain: adequate analgesia    Post assessment: no apparent anesthetic complications and tolerated procedure well    Post vital signs: stable    Level of consciousness: responds to stimulation    Nausea/Vomiting: no nausea/vomiting    Complications: none    Transfer of care protocol was followed      Last vitals: Visit Vitals  /87   Pulse 81   Temp 36.7 °C (98.1 °F) (Temporal)   Resp 18   Ht 5' 9" (1.753 m)   Wt 85.3 kg (188 lb)   SpO2 100%   BMI 27.76 kg/m²     "

## 2024-05-16 NOTE — PLAN OF CARE
Right wrist and left hand saline lock discontinued with canula intact; tolerated well.  Left upper chest dressing remains c/d/i and wnl at time of discharge.  Pt able to drink fluids while in CVRU without issue; remains AAOx3 at time of discharge.   Sling and swath on pt upon arrival from [facility]. Ice pack and Sling and swath education performed with pt and wife.    Discharge instructions, home medication list, and post discharge follow up appointments discussed with pt and wife.  Discharged to wife, via wheelchair, in no apparent distress. All personal belongings taken with pt and wife.   Encouraged continued compliance with MD directives.

## 2024-05-16 NOTE — DISCHARGE INSTRUCTIONS
PACEMAKER/ICD INSTRUCTIONS    SAFETY  BECAUSE OF THE AFTEREFFECTS OF THE SEDATION YOU RECEIVED, WE ADVISE YOU TO REFRAIN FROM THE FOLLOWING ACTIVITIES FOR 24 HOURS.   1. DO NOT DRIVE OR OPERATE MACHINERY FOR 24 HOURS   2. DO NOT OPERATE APPLIANCES IF ALONE.     3. DON'T SIGN LEGAL PAPERS FOR 24 HOURS.     4. WE ADVISE THAT SOMEONE STAY WITH YOU AFTER THE PROCEDURE FOR AT LEAST 8 HOURS    DISCOMFORT: FOR GENERAL DISCOMFORT AT THE PUNCTURE, YOU MAY TAKE TYLENOL, 1-2 TABS EVERY 4-6 HOURS AS NEEDED.  DO NOT TAKE MORE THAN 4000 MG  IN 24 HOUR PERIOD.                            Ice pack to site should also be used for any swelling or discomfort.     ACTIVITY/ WOUND INSTRUCTIONS     AFFECTED ARM  DO NOT LIFT ARM ABOVE SHOULDER HEIGHT FOR 7 DAYS.                                  DO NOT  SWING ARM FOR 6 WEEKS                                DO NOT REMOVE DRESSING.  IT WILL BE REMOVED AT FOLLOW UP APPOINTMENT                                YOU MAY SHOWER IN 48 HOURS )Saturday late morning.)                                  DO NOT REMOVE THE DRESSING FOR SHOWER.                                 USE HIBICLENS SOAP ON CHEST AND NECK AREA  FOR 1 WEEK (below chin/above navel.)                                  FACE AWAY FROM SPRAY WHEN SHOWERING                                                                               REMOVE SLING FOR SHOWER, THEN REAPPLY AFTER SHOWER.                                USE ICE PACK CONTINUOUSLY FOR 48 HOURS .                                WEAR SLING AND SWATHE FOR 48 HOURS AROUND THE CLOCK THEN ONLY WHILE SLEEPING AT NIGHT FOR 2 WEEKS.              6. CALL YOUR HEALTHCARE PROVIDER IF YOU START TO HAVE THE FOLLOWING SYMPTOMS:                     1. High fever (101 degrees or higher)                 2.  Redness,drainage or swelling  or intense pain at the incision site       3.  Drowsiness that doesn't get better       4. Weakness or dizziness that doesn't get better            5. Repeated vomiting                                                                                                                 NOZIN INSTRUCTIONS     GOAL: TO REDUCE THE RISK OF POST-PROCEDURAL INFECTIONS BY BACTERIA IN THE NASAL CAVITY.  THINK OF IT AS A HAND  FOR YOUR NOSE.     HOW TO USE:  1. SHAKE NOZIN BOTTLE WELL                            2. TAKE A COTTON SWAB AND APPLY FOUR DROPS TO TIP.                            3. INSERT COTTON SWAB INTO ONE NOSTRIL, BEING SURE NOT TO GO DEEPER INTO NOSE THAN THE TIP OF THE SWAB.                            4.SWAB NOSTRIL 6 TIMES CLOCKWISE AND 6 TIMES COUNERCLOCKWISE.  MAKE SURE TO SWAB THE INSIDE FRONT POCKET OF NOSTRIL.                             5. TAKE SWAB OUT AND APPLY 2 DROPS TO THE SAME COTTON TIP.  REPEAT STEPS 3 AND 4 IN THE OTHER NOSTRIL      DO STEPS 1-5 TWICE A DAY FOR 7 DAYS.

## 2024-05-17 NOTE — ANESTHESIA POSTPROCEDURE EVALUATION
Anesthesia Post Evaluation    Patient: Milind Melgar    Procedure(s) Performed: Procedure(s) (LRB):  Insertion, ICD, Dual Chamber/ biotronik (Left)    Final Anesthesia Type: general      Patient location during evaluation: PACU  Patient participation: Yes- Able to Participate  Level of consciousness: awake and alert, oriented and awake  Post-procedure vital signs: reviewed and stable  Pain management: adequate  Airway patency: patent    PONV status at discharge: No PONV  Anesthetic complications: no      Cardiovascular status: blood pressure returned to baseline  Respiratory status: unassisted  Hydration status: euvolemic  Follow-up not needed.              Vitals Value Taken Time   /94 05/16/24 1230   Temp 36.6 °C (97.8 °F) 05/16/24 1030   Pulse 75 05/16/24 1234   Resp 15 05/16/24 1234   SpO2 100 % 05/16/24 1234   Vitals shown include unfiled device data.      No case tracking events are documented in the log.      Pain/Yobani Score: Pain Rating Prior to Med Admin: 8 (5/16/2024 10:49 AM)  Yobani Score: 10 (5/16/2024 10:30 AM)

## 2024-05-19 LAB
OHS QRS DURATION: 90 MS
OHS QRS DURATION: 92 MS
OHS QTC CALCULATION: 398 MS
OHS QTC CALCULATION: 407 MS

## 2024-05-19 NOTE — PROGRESS NOTES
See comments below and call patient to discuss.   Please close encounter when done -- no need to route back to me.  Thanks  Testosterone is low normal.  Keep same

## 2024-05-19 NOTE — DISCHARGE SUMMARY
[facility] - Cath Lab (Hospital)  Discharge Note  Short Stay    Procedure(s) (LRB):  Insertion, ICD, Dual Chamber/ biotronik (Left)      OUTCOME: Patient tolerated treatment/procedure well without complication and is now ready for discharge.    DISPOSITION: Home or Self Care    FINAL DIAGNOSIS:  He is now status post the ICD implant.    FOLLOWUP: In clinic    DISCHARGE INSTRUCTIONS:    Discharge Procedure Orders   Diet general        TIME SPENT ON DISCHARGE: 20 minutes

## 2024-05-19 NOTE — PROGRESS NOTES
"  Subjective:   Patient ID:  Milind Melgar is a 53 y.o. male     Chief complaint:VF    HPI  New patient to me. (05/13/2024 )  Referred by Dr Wolfe for evaluation and management of CA/VF  --   Background as gleaned from patient's records :  53 y.o. 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 He had finished exercise some time prior to the VF event and apparently had been on a tense phone call prior to c/o palps and the LOC.   Patient has history of palpitations for the past year or more. He was seen by Dr Philip in 2023 with extended monitor which revealed 8% PVCs and was started on acebutolol. He weaned himself off BB in January of this year with no recurrence of palps until day prior to CA.     Initially with multiorgan damage - shock liver , non STEMI (normal coronaries), ARF and stress gastric bleeds      Was sent home with lifeVest after recuperating in hospital but then had to deal with both GI bleed issues as well as positive blood cultures for cutibacterium acnis (he was given a course of IV Ab via a L sided PICC line - this is now complete and the PICC line has been removed).      He is here for further recs   Echo shows Nl EF and I have personally reviewed the actual images of all the ECG tracings available in Epic - none is indicative of the cause of VF.      Currently, patient feels well and has seemingly recovered completely.  Current Outpatient " Medications   Medication Sig    ALPRAZolam (XANAX) 0.25 MG tablet Take 0.25 mg by mouth 3 (three) times daily as needed for Anxiety.    aspirin 81 MG Chew Take 1 tablet (81 mg total) by mouth once daily.    cholecalciferol, vitamin D3, 1,250 mcg (50,000 unit) capsule Take 50,000 Units by mouth.    metoprolol succinate (TOPROL-XL) 25 MG 24 hr tablet Take 1 tablet (25 mg total) by mouth once daily.    miSOPROStoL (CYTOTEC) 200 MCG Tab Take 1 tablet (200 mcg total) by mouth every 6 (six) hours.    pantoprazole (PROTONIX) 40 MG tablet Take 1 tablet (40 mg total) by mouth once daily.    testosterone cypionate (DEPOTESTOTERONE CYPIONATE) 200 mg/mL injection Inject 200 mg into the muscle every 14 (fourteen) days.    cefadroxil (DURICEF) 500 MG Cap Take 2 capules tonight at 8 pm then 1 capule every 12 hrs until done.    ketorolac (TORADOL) 10 mg tablet Take 1 tablet (10 mg total) by mouth every 6 (six) hours. Please use sparingly. Start with 1/2 tablet per dose - it may be sufficient. for 5 days    meclizine (ANTIVERT) 25 mg tablet Take 25 mg by mouth every evening. (Patient not taking: Reported on 5/13/2024)    rosuvastatin (CRESTOR) 10 MG tablet Take 10 mg by mouth. (Patient not taking: Reported on 5/13/2024)     No current facility-administered medications for this visit.       Review of Systems     Constitutional: Reviewed  for decreased appetite, weight gain and weight loss.   HENT: Reviewed for nosebleeds.    Eyes:  Reviewed for blurred vision and visual disturbance.   Cardiovascular: Reviewed for chest pain, claudication, cyanosis,dyspnea on exertion, leg swelling, orthopnea,paroxysmal nocturnal dyspnearregular heartbeats, palpitations, near-syncope, and syncope.   Respiratory: Reviewed for cough, shortness of breath, wheezing, sleep disturbances due to breathing and snoring, .    Endocrine: Reviewed for heat intolerance.   Hematologic/Lymphatic: Reviewed for easy bruisability/bleeding.   Skin: Reviewed for rash.    Musculoskeletal: Reviewed for muscle weakness and myalgias.   Gastrointestinal: Reviewed for abdominal pain, anorexia, melena, nausea and vomiting.   Genitourinary: Reviewed for hesitancy, frequency, nocturia and incontinence.   Neurological: Reviewed for excessive daytime sleepiness, dizziness, vertigo, weakness, headaches, loss of balance and seizures,   Psychiatric/Behavioral:  Reviewed for insomnia, altered mental status, depression, anxiety and nervousness.          Social History     Tobacco Use   Smoking Status Never   Smokeless Tobacco Never   Tobacco Comments    Past vaping        reports current alcohol use.   Past Medical History:   Diagnosis Date    Diverticulitis     Hyperlipidemia     Hypertension     On mechanically assisted ventilation 04/24/2024    PVC's (premature ventricular contractions)      No family history on file.  Social History     Socioeconomic History    Marital status:    Tobacco Use    Smoking status: Never    Smokeless tobacco: Never    Tobacco comments:     Past vaping    Substance and Sexual Activity    Alcohol use: Yes     Comment: rare     Social Determinants of Health     Financial Resource Strain: Low Risk  (5/12/2024)    Overall Financial Resource Strain (CARDIA)     Difficulty of Paying Living Expenses: Not hard at all   Food Insecurity: No Food Insecurity (5/12/2024)    Hunger Vital Sign     Worried About Running Out of Food in the Last Year: Never true     Ran Out of Food in the Last Year: Never true   Transportation Needs: No Transportation Needs (5/12/2024)    PRAPARE - Transportation     Lack of Transportation (Medical): No     Lack of Transportation (Non-Medical): No   Physical Activity: Sufficiently Active (5/12/2024)    Exercise Vital Sign     Days of Exercise per Week: 4 days     Minutes of Exercise per Session: 40 min   Stress: Stress Concern Present (5/12/2024)    [employer] - Occupational Stress Questionnaire     Feeling of  Stress : Very much   Housing Stability: Low Risk  (5/12/2024)    Housing Stability Vital Sign     Unable to Pay for Housing in the Last Year: No     Homeless in the Last Year: No     Past Surgical History:   Procedure Laterality Date    CORONARY ANGIOGRAPHY N/A 4/24/2024    Procedure: ANGIOGRAM, CORONARY ARTERY;  Surgeon: Fitz Ochoa MD;  Location: [facility] CATH LAB;  Service: Cardiology;  Laterality: N/A;    ESOPHAGOGASTRODUODENOSCOPY Left 5/2/2024    Procedure: EGD (ESOPHAGOGASTRODUODENOSCOPY);  Surgeon: Nelson Swain MD;  Location: [facility] ENDO;  Service: Endoscopy;  Laterality: Left;    LEFT HEART CATHETERIZATION Left 4/24/2024    Procedure: Left heart cath;  Surgeon: Fitz Ochoa MD;  Location: [facility] CATH LAB;  Service: Cardiology;  Laterality: Left;    VENTRICULOGRAM, LEFT  4/24/2024    Procedure: Ventriculogram, Left;  Surgeon: Fitz Ochoa MD;  Location: [facility] CATH LAB;  Service: Cardiology;;       Objective:   Physical Exam  Vitals and nursing note reviewed.   Constitutional:       Appearance: Normal appearance. He is well-developed. He is not diaphoretic.   HENT:      Head: Normocephalic and atraumatic.      Right Ear: External ear normal.      Left Ear: External ear normal.   Eyes:      General:         Right eye: No discharge.         Left eye: No discharge.      Conjunctiva/sclera: Conjunctivae normal.      Right eye: Right conjunctiva is not injected.      Left eye: No hemorrhage.     Pupils: Pupils are equal, round, and reactive to light.   Neck:      Thyroid: No thyromegaly.      Vascular: No JVD.   Cardiovascular:      Rate and Rhythm: Normal rate and regular rhythm.      Chest Wall: PMI is not displaced.      Pulses: Intact distal pulses.           Carotid pulses are 2+ on the right side and 2+ on the left side.       Radial pulses are 2+ on the right side and 2+ on the left side.        Dorsalis pedis pulses are 2+ on the right side and 2+ on the left side.        Posterior tibial  pulses are 2+ on the right side and 2+ on the left side.      Heart sounds: Normal heart sounds. No midsystolic click and no opening snap. No murmur heard.     No friction rub. No gallop.   Pulmonary:      Effort: Pulmonary effort is normal. No respiratory distress.      Breath sounds: Normal breath sounds. No wheezing or rales.   Chest:      Chest wall: No tenderness.      Comments: Life vest is on  Abdominal:      Palpations: Abdomen is soft. There is no hepatomegaly.      Tenderness: There is no abdominal tenderness. There is no guarding or rebound.   Musculoskeletal:         General: No tenderness. Normal range of motion.      Cervical back: Neck supple.      Right knee: No swelling.      Left knee: No swelling.      Right lower leg: No swelling.      Left lower leg: No swelling.      Right ankle: No swelling.      Left ankle: No swelling.      Right foot: No swelling.      Left foot: No swelling.   Skin:     General: Skin is warm and dry.      Findings: No rash.   Neurological:      Mental Status: He is alert and oriented to person, place, and time.      Cranial Nerves: No cranial nerve deficit.      Coordination: Coordination normal.      Deep Tendon Reflexes: Reflexes are normal and symmetric.   Psychiatric:         Behavior: Behavior normal.       /76 (BP Location: Right arm, Patient Position: Sitting, BP Method: Medium (Manual))   Pulse 88   Wt 86.6 kg (190 lb 14.7 oz)   SpO2 100%   BMI 28.19 kg/m²       Results for orders placed during the hospital encounter of 04/23/24    Echo    Interpretation Summary    Left Ventricle: The left ventricle is normal in size. Normal wall thickness. There is concentric remodeling. There is normal systolic function with a visually estimated ejection fraction of 60 - 65%. Grade II diastolic dysfunction. Elevated left ventricular filling pressure.    Right Ventricle: Right ventricle was not well visualized due to poor acoustic window. Right ventricle wall motion  is  unable to be assessed. Systolic function could not be assesed.    Aortic Valve: Aortic valve peak velocity is 1.72 m/s. Mean gradient is 7 mmHg.    Mitral Valve: There is mild regurgitation.    IVC/SVC: Patient is ventilated, cannot use IVC diameter to estimate right atrial pressure.    WBC   Date Value Ref Range Status   05/13/2024 4.71 3.90 - 12.70 K/uL Final     Hematocrit   Date Value Ref Range Status   05/13/2024 34.2 (L) 40.0 - 54.0 % Final     Hemoglobin   Date Value Ref Range Status   05/13/2024 11.3 (L) 14.0 - 18.0 g/dL Final     Lab Results   Component Value Date     05/13/2024     Lab Results   Component Value Date    CREATININE 1.3 05/13/2024    EGFRNORACEVR >60.0 05/13/2024    K 4.7 05/13/2024     Lab Results   Component Value Date    BNP <10 05/01/2024            Assessment:    Recovered from cardiac arrest.  No apparent long-term organ in salts.  1. History of cardiac arrest    2. Ventricular fibrillation    3. Shock liver    4. Lactic acidosis    5. NSTEMI (non-ST elevated myocardial infarction)    6. Positive blood culture    7. Hypertension, unspecified type    8. Hyperlipidemia, unspecified hyperlipidemia type    9. Acute gastric ulcer without hemorrhage or perforation    10. Acute post-hemorrhagic anemia        Plan:    VDx implant -submuscular.  Very long discussion with patient about the process of ICD implantation, the need for it, limitations on exercise in the future, postop limitations etc..  I have discussed the procedure in detail with the patient. I described its benefits and risks. I reviewed alternative therapies and discussed their potential value. The patient was given ample opportunity to express concerns and ask questions and I provided appropriate responses and  answers to such.The patient understands and agrees to proceed.  Consent form was signed  by patient and myself and appropriately witnessed.   He probably should have a workup for sarcoidosis.  No orders of the  defined types were placed in this encounter.    Follow up for post op.  There are no discontinued medications.  Outpatient Encounter Medications as of 2024   Medication Sig Dispense Refill    ALPRAZolam (XANAX) 0.25 MG tablet Take 0.25 mg by mouth 3 (three) times daily as needed for Anxiety.      aspirin 81 MG Chew Take 1 tablet (81 mg total) by mouth once daily. 30 tablet 0    cholecalciferol, vitamin D3, 1,250 mcg (50,000 unit) capsule Take 50,000 Units by mouth.      [] dextrose 5 % in water (D5W) PgBk 100 mL with cefTRIAXone 2 gram SolR 2 g Inject 2 g into the vein once daily. for 14 days      metoprolol succinate (TOPROL-XL) 25 MG 24 hr tablet Take 1 tablet (25 mg total) by mouth once daily. 30 tablet 0    miSOPROStoL (CYTOTEC) 200 MCG Tab Take 1 tablet (200 mcg total) by mouth every 6 (six) hours. 120 tablet 0    pantoprazole (PROTONIX) 40 MG tablet Take 1 tablet (40 mg total) by mouth once daily. 90 tablet 3    testosterone cypionate (DEPOTESTOTERONE CYPIONATE) 200 mg/mL injection Inject 200 mg into the muscle every 14 (fourteen) days.      meclizine (ANTIVERT) 25 mg tablet Take 25 mg by mouth every evening. (Patient not taking: Reported on 2024)      rosuvastatin (CRESTOR) 10 MG tablet Take 10 mg by mouth. (Patient not taking: Reported on 2024)       No facility-administered encounter medications on file as of 2024.     Medication List with Changes/Refills   New Medications    CEFADROXIL (DURICEF) 500 MG CAP    Take 2 capules tonight at 8 pm then 1 capule every 12 hrs until done.    KETOROLAC (TORADOL) 10 MG TABLET    Take 1 tablet (10 mg total) by mouth every 6 (six) hours. Please use sparingly. Start with 1/2 tablet per dose - it may be sufficient. for 5 days   Current Medications    ALPRAZOLAM (XANAX) 0.25 MG TABLET    Take 0.25 mg by mouth 3 (three) times daily as needed for Anxiety.    ASPIRIN 81 MG CHEW    Take 1 tablet (81 mg total) by mouth once daily.    CHOLECALCIFEROL,  VITAMIN D3, 1,250 MCG (50,000 UNIT) CAPSULE    Take 50,000 Units by mouth.    MECLIZINE (ANTIVERT) 25 MG TABLET    Take 25 mg by mouth every evening.    METOPROLOL SUCCINATE (TOPROL-XL) 25 MG 24 HR TABLET    Take 1 tablet (25 mg total) by mouth once daily.    MISOPROSTOL (CYTOTEC) 200 MCG TAB    Take 1 tablet (200 mcg total) by mouth every 6 (six) hours.    PANTOPRAZOLE (PROTONIX) 40 MG TABLET    Take 1 tablet (40 mg total) by mouth once daily.    ROSUVASTATIN (CRESTOR) 10 MG TABLET    Take 10 mg by mouth.    TESTOSTERONE CYPIONATE (DEPOTESTOTERONE CYPIONATE) 200 MG/ML INJECTION    Inject 200 mg into the muscle every 14 (fourteen) days.          This note is at least partially dictated using the M*Modal Fluency Direct word recognition program. There are word recognition mistakes that are occasionally missed on review.     Pre-visit chart review: 25 min    Face to face time: 40 min, > 50% of which spent in education    I have reviewed the actual images of all relevant ECG, rhythm, holter and long term monitoring tracings available in EPIC, MUSE  and in [facility] as well as outside records.   I have reviewed the actual images of all relevant CXRays.   I have directly evaluated all relevant data pertaining to any CIED.     Post visit chart documentation and care coordination: 20 min     Visit today included increased complexity associated with issues of the VF/cardiac arrest, ICD implantation and surgical consent as well as future limitations addressed .

## 2024-05-19 NOTE — H&P (VIEW-ONLY)
"  Subjective:   Patient ID:  Milind Melgar is a 53 y.o. male     Chief complaint:VF    HPI  New patient to me. (05/13/2024 )  Referred by Dr Wolfe for evaluation and management of CA/VF  --   Background as gleaned from patient's records :  53 y.o. 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 He had finished exercise some time prior to the VF event and apparently had been on a tense phone call prior to c/o palps and the LOC.   Patient has history of palpitations for the past year or more. He was seen by Dr Philip in 2023 with extended monitor which revealed 8% PVCs and was started on acebutolol. He weaned himself off BB in January of this year with no recurrence of palps until day prior to CA.     Initially with multiorgan damage - shock liver , non STEMI (normal coronaries), ARF and stress gastric bleeds      Was sent home with lifeVest after recuperating in hospital but then had to deal with both GI bleed issues as well as positive blood cultures for cutibacterium acnis (he was given a course of IV Ab via a L sided PICC line - this is now complete and the PICC line has been removed).      He is here for further recs   Echo shows Nl EF and I have personally reviewed the actual images of all the ECG tracings available in Epic - none is indicative of the cause of VF.      Currently, patient feels well and has seemingly recovered completely.  Current Outpatient " Medications   Medication Sig    ALPRAZolam (XANAX) 0.25 MG tablet Take 0.25 mg by mouth 3 (three) times daily as needed for Anxiety.    aspirin 81 MG Chew Take 1 tablet (81 mg total) by mouth once daily.    cholecalciferol, vitamin D3, 1,250 mcg (50,000 unit) capsule Take 50,000 Units by mouth.    metoprolol succinate (TOPROL-XL) 25 MG 24 hr tablet Take 1 tablet (25 mg total) by mouth once daily.    miSOPROStoL (CYTOTEC) 200 MCG Tab Take 1 tablet (200 mcg total) by mouth every 6 (six) hours.    pantoprazole (PROTONIX) 40 MG tablet Take 1 tablet (40 mg total) by mouth once daily.    testosterone cypionate (DEPOTESTOTERONE CYPIONATE) 200 mg/mL injection Inject 200 mg into the muscle every 14 (fourteen) days.    cefadroxil (DURICEF) 500 MG Cap Take 2 capules tonight at 8 pm then 1 capule every 12 hrs until done.    ketorolac (TORADOL) 10 mg tablet Take 1 tablet (10 mg total) by mouth every 6 (six) hours. Please use sparingly. Start with 1/2 tablet per dose - it may be sufficient. for 5 days    meclizine (ANTIVERT) 25 mg tablet Take 25 mg by mouth every evening. (Patient not taking: Reported on 5/13/2024)    rosuvastatin (CRESTOR) 10 MG tablet Take 10 mg by mouth. (Patient not taking: Reported on 5/13/2024)     No current facility-administered medications for this visit.       Review of Systems     Constitutional: Reviewed  for decreased appetite, weight gain and weight loss.   HENT: Reviewed for nosebleeds.    Eyes:  Reviewed for blurred vision and visual disturbance.   Cardiovascular: Reviewed for chest pain, claudication, cyanosis,dyspnea on exertion, leg swelling, orthopnea,paroxysmal nocturnal dyspnearregular heartbeats, palpitations, near-syncope, and syncope.   Respiratory: Reviewed for cough, shortness of breath, wheezing, sleep disturbances due to breathing and snoring, .    Endocrine: Reviewed for heat intolerance.   Hematologic/Lymphatic: Reviewed for easy bruisability/bleeding.   Skin: Reviewed for rash.    Musculoskeletal: Reviewed for muscle weakness and myalgias.   Gastrointestinal: Reviewed for abdominal pain, anorexia, melena, nausea and vomiting.   Genitourinary: Reviewed for hesitancy, frequency, nocturia and incontinence.   Neurological: Reviewed for excessive daytime sleepiness, dizziness, vertigo, weakness, headaches, loss of balance and seizures,   Psychiatric/Behavioral:  Reviewed for insomnia, altered mental status, depression, anxiety and nervousness.          Social History     Tobacco Use   Smoking Status Never   Smokeless Tobacco Never   Tobacco Comments    Past vaping        reports current alcohol use.   Past Medical History:   Diagnosis Date    Diverticulitis     Hyperlipidemia     Hypertension     On mechanically assisted ventilation 04/24/2024    PVC's (premature ventricular contractions)      No family history on file.  Social History     Socioeconomic History    Marital status:    Tobacco Use    Smoking status: Never    Smokeless tobacco: Never    Tobacco comments:     Past vaping    Substance and Sexual Activity    Alcohol use: Yes     Comment: rare     Social Determinants of Health     Financial Resource Strain: Low Risk  (5/12/2024)    Overall Financial Resource Strain (CARDIA)     Difficulty of Paying Living Expenses: Not hard at all   Food Insecurity: No Food Insecurity (5/12/2024)    Hunger Vital Sign     Worried About Running Out of Food in the Last Year: Never true     Ran Out of Food in the Last Year: Never true   Transportation Needs: No Transportation Needs (5/12/2024)    PRAPARE - Transportation     Lack of Transportation (Medical): No     Lack of Transportation (Non-Medical): No   Physical Activity: Sufficiently Active (5/12/2024)    Exercise Vital Sign     Days of Exercise per Week: 4 days     Minutes of Exercise per Session: 40 min   Stress: Stress Concern Present (5/12/2024)    [employer] - Occupational Stress Questionnaire     Feeling of  Stress : Very much   Housing Stability: Low Risk  (5/12/2024)    Housing Stability Vital Sign     Unable to Pay for Housing in the Last Year: No     Homeless in the Last Year: No     Past Surgical History:   Procedure Laterality Date    CORONARY ANGIOGRAPHY N/A 4/24/2024    Procedure: ANGIOGRAM, CORONARY ARTERY;  Surgeon: Fitz Ochoa MD;  Location: [facility] CATH LAB;  Service: Cardiology;  Laterality: N/A;    ESOPHAGOGASTRODUODENOSCOPY Left 5/2/2024    Procedure: EGD (ESOPHAGOGASTRODUODENOSCOPY);  Surgeon: Nelson Swain MD;  Location: [facility] ENDO;  Service: Endoscopy;  Laterality: Left;    LEFT HEART CATHETERIZATION Left 4/24/2024    Procedure: Left heart cath;  Surgeon: Fitz Ochoa MD;  Location: [facility] CATH LAB;  Service: Cardiology;  Laterality: Left;    VENTRICULOGRAM, LEFT  4/24/2024    Procedure: Ventriculogram, Left;  Surgeon: Fitz Ochoa MD;  Location: [facility] CATH LAB;  Service: Cardiology;;       Objective:   Physical Exam  Vitals and nursing note reviewed.   Constitutional:       Appearance: Normal appearance. He is well-developed. He is not diaphoretic.   HENT:      Head: Normocephalic and atraumatic.      Right Ear: External ear normal.      Left Ear: External ear normal.   Eyes:      General:         Right eye: No discharge.         Left eye: No discharge.      Conjunctiva/sclera: Conjunctivae normal.      Right eye: Right conjunctiva is not injected.      Left eye: No hemorrhage.     Pupils: Pupils are equal, round, and reactive to light.   Neck:      Thyroid: No thyromegaly.      Vascular: No JVD.   Cardiovascular:      Rate and Rhythm: Normal rate and regular rhythm.      Chest Wall: PMI is not displaced.      Pulses: Intact distal pulses.           Carotid pulses are 2+ on the right side and 2+ on the left side.       Radial pulses are 2+ on the right side and 2+ on the left side.        Dorsalis pedis pulses are 2+ on the right side and 2+ on the left side.        Posterior tibial  pulses are 2+ on the right side and 2+ on the left side.      Heart sounds: Normal heart sounds. No midsystolic click and no opening snap. No murmur heard.     No friction rub. No gallop.   Pulmonary:      Effort: Pulmonary effort is normal. No respiratory distress.      Breath sounds: Normal breath sounds. No wheezing or rales.   Chest:      Chest wall: No tenderness.      Comments: Life vest is on  Abdominal:      Palpations: Abdomen is soft. There is no hepatomegaly.      Tenderness: There is no abdominal tenderness. There is no guarding or rebound.   Musculoskeletal:         General: No tenderness. Normal range of motion.      Cervical back: Neck supple.      Right knee: No swelling.      Left knee: No swelling.      Right lower leg: No swelling.      Left lower leg: No swelling.      Right ankle: No swelling.      Left ankle: No swelling.      Right foot: No swelling.      Left foot: No swelling.   Skin:     General: Skin is warm and dry.      Findings: No rash.   Neurological:      Mental Status: He is alert and oriented to person, place, and time.      Cranial Nerves: No cranial nerve deficit.      Coordination: Coordination normal.      Deep Tendon Reflexes: Reflexes are normal and symmetric.   Psychiatric:         Behavior: Behavior normal.       /76 (BP Location: Right arm, Patient Position: Sitting, BP Method: Medium (Manual))   Pulse 88   Wt 86.6 kg (190 lb 14.7 oz)   SpO2 100%   BMI 28.19 kg/m²       Results for orders placed during the hospital encounter of 04/23/24    Echo    Interpretation Summary    Left Ventricle: The left ventricle is normal in size. Normal wall thickness. There is concentric remodeling. There is normal systolic function with a visually estimated ejection fraction of 60 - 65%. Grade II diastolic dysfunction. Elevated left ventricular filling pressure.    Right Ventricle: Right ventricle was not well visualized due to poor acoustic window. Right ventricle wall motion  is  unable to be assessed. Systolic function could not be assesed.    Aortic Valve: Aortic valve peak velocity is 1.72 m/s. Mean gradient is 7 mmHg.    Mitral Valve: There is mild regurgitation.    IVC/SVC: Patient is ventilated, cannot use IVC diameter to estimate right atrial pressure.    WBC   Date Value Ref Range Status   05/13/2024 4.71 3.90 - 12.70 K/uL Final     Hematocrit   Date Value Ref Range Status   05/13/2024 34.2 (L) 40.0 - 54.0 % Final     Hemoglobin   Date Value Ref Range Status   05/13/2024 11.3 (L) 14.0 - 18.0 g/dL Final     Lab Results   Component Value Date     05/13/2024     Lab Results   Component Value Date    CREATININE 1.3 05/13/2024    EGFRNORACEVR >60.0 05/13/2024    K 4.7 05/13/2024     Lab Results   Component Value Date    BNP <10 05/01/2024            Assessment:    Recovered from cardiac arrest.  No apparent long-term organ in salts.  1. History of cardiac arrest    2. Ventricular fibrillation    3. Shock liver    4. Lactic acidosis    5. NSTEMI (non-ST elevated myocardial infarction)    6. Positive blood culture    7. Hypertension, unspecified type    8. Hyperlipidemia, unspecified hyperlipidemia type    9. Acute gastric ulcer without hemorrhage or perforation    10. Acute post-hemorrhagic anemia        Plan:    VDx implant -submuscular.  Very long discussion with patient about the process of ICD implantation, the need for it, limitations on exercise in the future, postop limitations etc..  I have discussed the procedure in detail with the patient. I described its benefits and risks. I reviewed alternative therapies and discussed their potential value. The patient was given ample opportunity to express concerns and ask questions and I provided appropriate responses and  answers to such.The patient understands and agrees to proceed.  Consent form was signed  by patient and myself and appropriately witnessed.   He probably should have a workup for sarcoidosis.  No orders of the  defined types were placed in this encounter.    Follow up for post op.  There are no discontinued medications.  Outpatient Encounter Medications as of 2024   Medication Sig Dispense Refill    ALPRAZolam (XANAX) 0.25 MG tablet Take 0.25 mg by mouth 3 (three) times daily as needed for Anxiety.      aspirin 81 MG Chew Take 1 tablet (81 mg total) by mouth once daily. 30 tablet 0    cholecalciferol, vitamin D3, 1,250 mcg (50,000 unit) capsule Take 50,000 Units by mouth.      [] dextrose 5 % in water (D5W) PgBk 100 mL with cefTRIAXone 2 gram SolR 2 g Inject 2 g into the vein once daily. for 14 days      metoprolol succinate (TOPROL-XL) 25 MG 24 hr tablet Take 1 tablet (25 mg total) by mouth once daily. 30 tablet 0    miSOPROStoL (CYTOTEC) 200 MCG Tab Take 1 tablet (200 mcg total) by mouth every 6 (six) hours. 120 tablet 0    pantoprazole (PROTONIX) 40 MG tablet Take 1 tablet (40 mg total) by mouth once daily. 90 tablet 3    testosterone cypionate (DEPOTESTOTERONE CYPIONATE) 200 mg/mL injection Inject 200 mg into the muscle every 14 (fourteen) days.      meclizine (ANTIVERT) 25 mg tablet Take 25 mg by mouth every evening. (Patient not taking: Reported on 2024)      rosuvastatin (CRESTOR) 10 MG tablet Take 10 mg by mouth. (Patient not taking: Reported on 2024)       No facility-administered encounter medications on file as of 2024.     Medication List with Changes/Refills   New Medications    CEFADROXIL (DURICEF) 500 MG CAP    Take 2 capules tonight at 8 pm then 1 capule every 12 hrs until done.    KETOROLAC (TORADOL) 10 MG TABLET    Take 1 tablet (10 mg total) by mouth every 6 (six) hours. Please use sparingly. Start with 1/2 tablet per dose - it may be sufficient. for 5 days   Current Medications    ALPRAZOLAM (XANAX) 0.25 MG TABLET    Take 0.25 mg by mouth 3 (three) times daily as needed for Anxiety.    ASPIRIN 81 MG CHEW    Take 1 tablet (81 mg total) by mouth once daily.    CHOLECALCIFEROL,  VITAMIN D3, 1,250 MCG (50,000 UNIT) CAPSULE    Take 50,000 Units by mouth.    MECLIZINE (ANTIVERT) 25 MG TABLET    Take 25 mg by mouth every evening.    METOPROLOL SUCCINATE (TOPROL-XL) 25 MG 24 HR TABLET    Take 1 tablet (25 mg total) by mouth once daily.    MISOPROSTOL (CYTOTEC) 200 MCG TAB    Take 1 tablet (200 mcg total) by mouth every 6 (six) hours.    PANTOPRAZOLE (PROTONIX) 40 MG TABLET    Take 1 tablet (40 mg total) by mouth once daily.    ROSUVASTATIN (CRESTOR) 10 MG TABLET    Take 10 mg by mouth.    TESTOSTERONE CYPIONATE (DEPOTESTOTERONE CYPIONATE) 200 MG/ML INJECTION    Inject 200 mg into the muscle every 14 (fourteen) days.          This note is at least partially dictated using the M*Modal Fluency Direct word recognition program. There are word recognition mistakes that are occasionally missed on review.     Pre-visit chart review: 25 min    Face to face time: 40 min, > 50% of which spent in education    I have reviewed the actual images of all relevant ECG, rhythm, holter and long term monitoring tracings available in EPIC, MUSE  and in [facility] as well as outside records.   I have reviewed the actual images of all relevant CXRays.   I have directly evaluated all relevant data pertaining to any CIED.     Post visit chart documentation and care coordination: 20 min     Visit today included increased complexity associated with issues of the VF/cardiac arrest, ICD implantation and surgical consent as well as future limitations addressed .

## 2024-05-20 ENCOUNTER — HOSPITAL ENCOUNTER (OUTPATIENT)
Dept: CARDIOLOGY | Facility: HOSPITAL | Age: 54
Discharge: HOME | End: 2024-05-20
Payer: COMMERCIAL

## 2024-05-20 ENCOUNTER — PATIENT MESSAGE (OUTPATIENT)
Dept: CARDIOLOGY | Facility: CLINIC | Age: 54
End: 2024-05-20
Payer: COMMERCIAL

## 2024-05-20 DIAGNOSIS — I46.9 CARDIAC ARREST: ICD-10-CM

## 2024-05-20 PROCEDURE — 93282 PRGRMG EVAL IMPLANTABLE DFB: CPT | Mod: 26,,, | Performed by: INTERNAL MEDICINE

## 2024-05-20 PROCEDURE — 93282 PRGRMG EVAL IMPLANTABLE DFB: CPT

## 2024-05-21 NOTE — PHYSICIAN QUERY
"Due to conflicting clinical picture Please clarify the Sepsis diagnosis.   To respond, click "New Note" select your response press enter then sign to complete the query       Ruled Out      "

## 2024-05-22 VITALS
HEIGHT: 69 IN | OXYGEN SATURATION: 100 % | SYSTOLIC BLOOD PRESSURE: 151 MMHG | DIASTOLIC BLOOD PRESSURE: 94 MMHG | TEMPERATURE: 98 F | RESPIRATION RATE: 13 BRPM | HEART RATE: 69 BPM | BODY MASS INDEX: 27.85 KG/M2 | WEIGHT: 188 LBS

## 2024-05-28 LAB
OHS CV AF BURDEN PERCENT: < 1
OHS CV DC REMOTE DEVICE TYPE: NORMAL
OHS CV RV PACING PERCENT: 0 %

## 2024-05-30 NOTE — PHYSICIAN QUERY
Please provide the respiratory diagnosis associated with the clinical findings:       Respiratory failure with hypoxia and hypercapnia

## 2024-06-06 ENCOUNTER — EXTERNAL HOME HEALTH (OUTPATIENT)
Dept: HOME HEALTH SERVICES | Facility: HOSPITAL | Age: 54
End: 2024-06-06
Payer: COMMERCIAL

## 2024-06-17 ENCOUNTER — DOCUMENT SCAN (OUTPATIENT)
Dept: HOME HEALTH SERVICES | Facility: HOSPITAL | Age: 54
End: 2024-06-17
Payer: COMMERCIAL

## 2024-06-19 ENCOUNTER — CLINICAL SUPPORT (OUTPATIENT)
Dept: CARDIOLOGY | Facility: HOSPITAL | Age: 54
End: 2024-06-19
Attending: INTERNAL MEDICINE
Payer: COMMERCIAL

## 2024-06-19 ENCOUNTER — CLINICAL SUPPORT (OUTPATIENT)
Dept: CARDIOLOGY | Facility: HOSPITAL | Age: 54
End: 2024-06-19
Payer: COMMERCIAL

## 2024-06-19 DIAGNOSIS — I49.01 VENTRICULAR FIBRILLATION: ICD-10-CM

## 2024-06-19 DIAGNOSIS — Z95.810 PRESENCE OF AUTOMATIC (IMPLANTABLE) CARDIAC DEFIBRILLATOR: ICD-10-CM

## 2024-06-19 PROCEDURE — 93295 DEV INTERROG REMOTE 1/2/MLT: CPT | Mod: S$GLB,,, | Performed by: INTERNAL MEDICINE

## 2024-06-19 PROCEDURE — 93296 REM INTERROG EVL PM/IDS: CPT | Performed by: INTERNAL MEDICINE

## 2024-06-20 ENCOUNTER — DOCUMENTATION ONLY (OUTPATIENT)
Dept: CARDIOLOGY | Facility: HOSPITAL | Age: 54
End: 2024-06-20
Payer: COMMERCIAL

## 2024-06-20 DIAGNOSIS — Z95.810 PRESENCE OF AUTOMATIC IMPLANTABLE CARDIOVERTER-DEFIBRILLATOR: Primary | ICD-10-CM

## 2024-06-20 DIAGNOSIS — I21.4 NSTEMI (NON-ST ELEVATED MYOCARDIAL INFARCTION): ICD-10-CM

## 2024-06-20 DIAGNOSIS — Z86.74 HISTORY OF CARDIAC ARREST: ICD-10-CM

## 2024-06-20 DIAGNOSIS — I49.01 VENTRICULAR FIBRILLATION: ICD-10-CM

## 2024-06-20 NOTE — PROGRESS NOTES
Received a call/message from Yuridia in the MRI Department in relation to this patient needing to be scheduled for a MRI and has a [employer] ICD/Pacemaker.  Patient's Device is MRI compatible/conditional.  To meet protocol the Pacemaker/ICD must have been implanted no less than 6 weeks prior to scheduled date of Scan.  ICD/PPM and leads must be from same .  MRI informed ordering MD must input a Cardiac Device Check in clinic and hospital order, patient must have an xray within 6 months or less prior to MRI reprogramming.  Chest xray to be reviewed by Radiologist.       MRI has been canceled.    Patient can have an MRI but not until after 07/08/2024 due to new implant.

## 2024-06-26 LAB
OHS CV AF BURDEN PERCENT: < 1
OHS CV DC REMOTE DEVICE TYPE: NORMAL
OHS CV RV PACING PERCENT: 0 %

## 2024-06-27 ENCOUNTER — DOCUMENTATION ONLY (OUTPATIENT)
Dept: CARDIOLOGY | Facility: HOSPITAL | Age: 54
End: 2024-06-27
Payer: COMMERCIAL

## 2024-06-27 ENCOUNTER — DOCUMENT SCAN (OUTPATIENT)
Dept: HOME HEALTH SERVICES | Facility: HOSPITAL | Age: 54
End: 2024-06-27
Payer: COMMERCIAL

## 2024-06-27 NOTE — PROGRESS NOTES
Received a message from Yuridia in the MRI Department in relation to this patient needing to be scheduled for a MRI and has a [employer] ICD.  Please see information below as it relates to patient's Device being MRI compatible/conditional.  To meet protocol the Pacemaker/ICD must have been implanted no less than 6 weeks prior to scheduled date of Scan.  ICD/PPM and leads must be from same .  MRI informed ordering MD must input a Cardiac Device Check in clinic and hospital order, patient must have an xray within 6 months or less prior to MRI reprogramming.  Chest xray to be reviewed by Radiologist.         MRI scheduled on (7/9/24 @9:00 am).  [email or web address] Rep (Alyce SÁNCHEZ) notified on (6/27/24).  Confirmation received from Rep.

## 2024-07-01 ENCOUNTER — TELEPHONE (OUTPATIENT)
Dept: CARDIOLOGY | Facility: HOSPITAL | Age: 54
End: 2024-07-01
Payer: COMMERCIAL

## 2024-07-01 ENCOUNTER — PATIENT MESSAGE (OUTPATIENT)
Dept: CARDIOLOGY | Facility: HOSPITAL | Age: 54
End: 2024-07-01
Payer: COMMERCIAL

## 2024-07-01 NOTE — TELEPHONE ENCOUNTER
Calling regarding your upcoming Cardiac MRI scheduled for 07/09/2024 at 9:00. For return call I can be reached at [phone], M-F 7:30-4. Thank you!

## 2024-07-29 PROBLEM — N17.9 AKI (ACUTE KIDNEY INJURY): Status: RESOLVED | Noted: 2024-04-24 | Resolved: 2024-07-29

## 2024-07-29 PROBLEM — I21.4 NSTEMI (NON-ST ELEVATED MYOCARDIAL INFARCTION): Status: RESOLVED | Noted: 2024-04-24 | Resolved: 2024-07-29

## 2024-08-01 DIAGNOSIS — Z12.11 SPECIAL SCREENING FOR MALIGNANT NEOPLASMS, COLON: Primary | ICD-10-CM

## 2024-08-01 DIAGNOSIS — K25.3 ACUTE GASTRIC ULCER WITHOUT HEMORRHAGE OR PERFORATION: ICD-10-CM

## 2024-08-01 DIAGNOSIS — D62 ACUTE POST-HEMORRHAGIC ANEMIA: ICD-10-CM

## 2024-08-02 ENCOUNTER — HOSPITAL ENCOUNTER (OUTPATIENT)
Dept: PREADMISSION TESTING | Facility: HOSPITAL | Age: 54
Discharge: HOME | End: 2024-08-02
Attending: INTERNAL MEDICINE
Payer: COMMERCIAL

## 2024-08-02 DIAGNOSIS — D62 ACUTE POST-HEMORRHAGIC ANEMIA: ICD-10-CM

## 2024-08-02 DIAGNOSIS — K25.3 ACUTE GASTRIC ULCER WITHOUT HEMORRHAGE OR PERFORATION: ICD-10-CM

## 2024-08-02 DIAGNOSIS — Z12.11 SPECIAL SCREENING FOR MALIGNANT NEOPLASMS, COLON: ICD-10-CM

## 2024-08-05 PROBLEM — K25.3 ACUTE GASTRIC ULCER WITHOUT HEMORRHAGE OR PERFORATION: Status: RESOLVED | Noted: 2024-05-02 | Resolved: 2024-08-05

## 2024-08-26 ENCOUNTER — OFFICE VISIT (OUTPATIENT)
Dept: CARDIOLOGY | Facility: CLINIC | Age: 54
End: 2024-08-26
Attending: INTERNAL MEDICINE
Payer: COMMERCIAL

## 2024-08-26 ENCOUNTER — HOSPITAL ENCOUNTER (OUTPATIENT)
Dept: CARDIOLOGY | Facility: HOSPITAL | Age: 54
Discharge: HOME | End: 2024-08-26
Attending: INTERNAL MEDICINE
Payer: COMMERCIAL

## 2024-08-26 ENCOUNTER — HOSPITAL ENCOUNTER (OUTPATIENT)
Dept: RADIOLOGY | Facility: HOSPITAL | Age: 54
Discharge: HOME | End: 2024-08-26
Attending: INTERNAL MEDICINE
Payer: COMMERCIAL

## 2024-08-26 VITALS
BODY MASS INDEX: 29.95 KG/M2 | DIASTOLIC BLOOD PRESSURE: 86 MMHG | WEIGHT: 202.81 LBS | SYSTOLIC BLOOD PRESSURE: 130 MMHG | OXYGEN SATURATION: 99 % | HEART RATE: 79 BPM

## 2024-08-26 DIAGNOSIS — Z95.810 PRESENCE OF AUTOMATIC IMPLANTABLE CARDIOVERTER-DEFIBRILLATOR: ICD-10-CM

## 2024-08-26 DIAGNOSIS — I10 HYPERTENSION, UNSPECIFIED TYPE: Chronic | ICD-10-CM

## 2024-08-26 DIAGNOSIS — I49.01 VF (VENTRICULAR FIBRILLATION): ICD-10-CM

## 2024-08-26 DIAGNOSIS — I49.01 VF (VENTRICULAR FIBRILLATION): Primary | ICD-10-CM

## 2024-08-26 DIAGNOSIS — Z86.74 HISTORY OF CARDIAC ARREST: Primary | ICD-10-CM

## 2024-08-26 PROCEDURE — 71046 X-RAY EXAM CHEST 2 VIEWS: CPT | Mod: 26,,, | Performed by: RADIOLOGY

## 2024-08-26 PROCEDURE — 93010 ELECTROCARDIOGRAM REPORT: CPT | Mod: ,,, | Performed by: INTERNAL MEDICINE

## 2024-08-26 PROCEDURE — 93005 ELECTROCARDIOGRAM TRACING: CPT

## 2024-08-26 PROCEDURE — 99999 PR PBB SHADOW E&M-EST. PATIENT-LVL IV: CPT | Mod: PBBFAC,,, | Performed by: INTERNAL MEDICINE

## 2024-08-26 PROCEDURE — 71046 X-RAY EXAM CHEST 2 VIEWS: CPT | Mod: TC

## 2024-08-26 PROCEDURE — 3008F BODY MASS INDEX DOCD: CPT | Mod: CPTII,S$GLB,, | Performed by: INTERNAL MEDICINE

## 2024-08-26 PROCEDURE — 3044F HG A1C LEVEL LT 7.0%: CPT | Mod: CPTII,S$GLB,, | Performed by: INTERNAL MEDICINE

## 2024-08-26 PROCEDURE — 93282 PRGRMG EVAL IMPLANTABLE DFB: CPT | Mod: 26,,, | Performed by: INTERNAL MEDICINE

## 2024-08-26 PROCEDURE — 93282 PRGRMG EVAL IMPLANTABLE DFB: CPT

## 2024-08-26 PROCEDURE — 1160F RVW MEDS BY RX/DR IN RCRD: CPT | Mod: CPTII,S$GLB,, | Performed by: INTERNAL MEDICINE

## 2024-08-26 PROCEDURE — 3075F SYST BP GE 130 - 139MM HG: CPT | Mod: CPTII,S$GLB,, | Performed by: INTERNAL MEDICINE

## 2024-08-26 PROCEDURE — 3079F DIAST BP 80-89 MM HG: CPT | Mod: CPTII,S$GLB,, | Performed by: INTERNAL MEDICINE

## 2024-08-26 PROCEDURE — 1159F MED LIST DOCD IN RCRD: CPT | Mod: CPTII,S$GLB,, | Performed by: INTERNAL MEDICINE

## 2024-08-26 PROCEDURE — 99214 OFFICE O/P EST MOD 30 MIN: CPT | Mod: S$GLB,,, | Performed by: INTERNAL MEDICINE

## 2024-08-27 ENCOUNTER — PATIENT MESSAGE (OUTPATIENT)
Dept: CARDIOLOGY | Facility: CLINIC | Age: 54
End: 2024-08-27
Payer: COMMERCIAL

## 2024-08-27 LAB
OHS QRS DURATION: 104 MS
OHS QTC CALCULATION: 387 MS

## 2024-08-31 LAB
OHS CV DC REMOTE DEVICE TYPE: NORMAL
OHS CV RV PACING PERCENT: 0 %

## 2024-09-04 NOTE — PROGRESS NOTES
"  Subjective:   Patient ID:  Milind Melgar is a 53 y.o. male     Chief complaint:  SCD/VF/ICD    HPI  New patient to me. (05/13/2024 )  Referred by Dr Wolfe for evaluation and management of CA/VF  --   Background as gleaned from patient's records :  53 y.o. 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 He had finished exercise some time prior to the VF event and apparently had been on a tense phone call prior to c/o palps and the LOC.   Patient has history of palpitations for the past year or more. He was seen by Dr Philip in 2023 with extended monitor which revealed 8% PVCs and was started on acebutolol. He weaned himself off BB in January of this year with no recurrence of palps until day prior to CA.      Initially with multiorgan damage - shock liver , non STEMI (normal coronaries), ARF and stress gastric bleeds      Was sent home with lifeVest after recuperating in hospital but then had to deal with both GI bleed issues as well as positive blood cultures for cutibacterium acnis (he was given a course of IV Ab via a L sided PICC line - this is now complete and the PICC line has been removed).      He is here for further recs   Echo shows Nl EF and I have personally reviewed the actual images of all the ECG tracings available in Epic - none is indicative of the cause of VF.       >>  Recovered from cardiac arrest. No apparent long-term organ insults.   >>  VD " ex implant-submuscular    Update 08/26/2024 :  He had his ICD implant on 05/16/2024.  Medullary he has had no issues since then  He has been doing well from a CV point of view w/o c/o undue dyspnea on exertion, orthopnea, PND, leg edema, abdominal swelling chest pain, palpitations, syncope or near-syncope.    Is back fully active.  He is back to the gym.  He is avoiding extreme exercise but still works out vigorously.    Patient's device (VBX)was fully evaluated today under my direct supervision and real-time feedback.  Summary of findings are as listed below:  Device is in good repair.   The battery is not near JESSICA.  The sensing and pacing thresholds are favorable with well maintained safety margins.   There were no significant tachy-dysrhythmias noted.  There were no device data indicating possible ongoing fluid retention.    Recommendation:  Device follow up as per clinic routine with remote and in house checks    I have reviewed the actual image of the ECG tracing obtained today and it shows NSR with normal intervals. HR is 72    Current Outpatient Medications   Medication Sig    ALPRAZolam (XANAX) 0.25 MG tablet Take 0.25 mg by mouth 3 (three) times daily as needed for Anxiety.    aspirin 81 MG Chew Take 1 tablet (81 mg total) by mouth once daily.    metoprolol succinate (TOPROL-XL) 25 MG 24 hr tablet Take 1 tablet (25 mg total) by mouth once daily.    pantoprazole (PROTONIX) 40 MG tablet Take 1 tablet (40 mg total) by mouth once daily.    testosterone cypionate (DEPOTESTOTERONE CYPIONATE) 200 mg/mL injection Inject 200 mg into the muscle every 14 (fourteen) days.    cefadroxil (DURICEF) 500 MG Cap Take 2 capules tonight at 8 pm then 1 capule every 12 hrs until done.    cholecalciferol, vitamin D3, 1,250 mcg (50,000 unit) capsule Take 50,000 Units by mouth.    meclizine (ANTIVERT) 25 mg tablet Take 25 mg by mouth every evening.    miSOPROStoL (CYTOTEC) 200 MCG Tab Take 1 tablet (200 mcg total) by mouth every 6  (six) hours.    rosuvastatin (CRESTOR) 10 MG tablet Take 10 mg by mouth.     No current facility-administered medications for this visit.       Review of Systems     Constitutional: Reviewed  for decreased appetite, weight gain and weight loss.   HENT: Reviewed for nosebleeds.    Eyes:  Reviewed for blurred vision and visual disturbance.   Cardiovascular: Reviewed for chest pain, claudication, cyanosis,dyspnea on exertion, leg swelling, orthopnea,paroxysmal nocturnal dyspnearregular heartbeats, palpitations, near-syncope, and syncope.   Respiratory: Reviewed for cough, shortness of breath, wheezing, sleep disturbances due to breathing and snoring, .    Endocrine: Reviewed for heat intolerance.   Hematologic/Lymphatic: Reviewed for easy bruisability/bleeding.   Skin: Reviewed for rash.   Musculoskeletal: Reviewed for muscle weakness and myalgias.   Gastrointestinal: Reviewed for abdominal pain, anorexia, melena, nausea and vomiting.   Genitourinary: Reviewed for hesitancy, frequency, nocturia and incontinence.   Neurological: Reviewed for excessive daytime sleepiness, dizziness, vertigo, weakness, headaches, loss of balance and seizures,   Psychiatric/Behavioral:  Reviewed for insomnia, altered mental status, depression, anxiety and nervousness.       All symptoms reviewed above were negative except for none       Social History     Tobacco Use   Smoking Status Never   Smokeless Tobacco Never   Tobacco Comments    Past vaping         Objective:     Physical Exam  Vitals and nursing note reviewed.   Constitutional:       Appearance: Normal appearance. He is well-developed.   HENT:      Head: Normocephalic and atraumatic.      Right Ear: External ear normal.      Left Ear: External ear normal.   Eyes:      Conjunctiva/sclera: Conjunctivae normal.      Left eye: Left conjunctiva is not injected. No hemorrhage.     Pupils: Pupils are equal, round, and reactive to light.   Neck:      Thyroid: No thyromegaly.       Vascular: No JVD.   Cardiovascular:      Rate and Rhythm: Normal rate and regular rhythm.      Chest Wall: PMI is not displaced.      Pulses: Intact distal pulses.           Carotid pulses are 2+ on the right side and 2+ on the left side.       Radial pulses are 2+ on the right side and 2+ on the left side.        Dorsalis pedis pulses are 2+ on the right side and 2+ on the left side.        Posterior tibial pulses are 2+ on the right side and 2+ on the left side.      Heart sounds: Normal heart sounds. No midsystolic click and no opening snap. No murmur heard.     No friction rub. No gallop.   Pulmonary:      Effort: Pulmonary effort is normal. No respiratory distress.      Breath sounds: Normal breath sounds. No wheezing or rales.   Chest:      Chest wall: No tenderness.      Comments: Device pocket is in excellent repair.  Abdominal:      Palpations: Abdomen is soft. Abdomen is not rigid. There is no hepatomegaly.      Tenderness: There is no abdominal tenderness.   Musculoskeletal:         General: No tenderness. Normal range of motion.      Cervical back: Neck supple.      Right knee: No swelling.      Left knee: No swelling.      Right lower leg: No swelling.      Left lower leg: No swelling.      Right ankle: No swelling.      Left ankle: No swelling.      Right foot: No swelling.      Left foot: No swelling.   Skin:     General: Skin is warm and dry.      Findings: No rash.   Neurological:      Mental Status: He is alert and oriented to person, place, and time.      Cranial Nerves: No cranial nerve deficit.      Coordination: Coordination normal.      Deep Tendon Reflexes: Reflexes are normal and symmetric.   Psychiatric:         Behavior: Behavior normal.       /86 (BP Location: Left arm, Patient Position: Sitting)   Pulse 79   Wt 92 kg (202 lb 13.2 oz)   SpO2 99%   BMI 29.95 kg/m²       Results for orders placed during the hospital encounter of 04/23/24    Echo    Interpretation Summary    Left  Ventricle: The left ventricle is normal in size. Normal wall thickness. There is concentric remodeling. There is normal systolic function with a visually estimated ejection fraction of 60 - 65%. Grade II diastolic dysfunction. Elevated left ventricular filling pressure.    Right Ventricle: Right ventricle was not well visualized due to poor acoustic window. Right ventricle wall motion  is unable to be assessed. Systolic function could not be assesed.    Aortic Valve: Aortic valve peak velocity is 1.72 m/s. Mean gradient is 7 mmHg.    Mitral Valve: There is mild regurgitation.    IVC/SVC: Patient is ventilated, cannot use IVC diameter to estimate right atrial pressure.    WBC   Date Value Ref Range Status   05/13/2024 4.71 3.90 - 12.70 K/uL Final     Hematocrit   Date Value Ref Range Status   05/13/2024 34.2 (L) 40.0 - 54.0 % Final     Hemoglobin   Date Value Ref Range Status   05/13/2024 11.3 (L) 14.0 - 18.0 g/dL Final     Lab Results   Component Value Date     05/13/2024     Lab Results   Component Value Date    CREATININE 1.3 05/13/2024    EGFRNORACEVR >60.0 05/13/2024    K 4.7 05/13/2024     Lab Results   Component Value Date    BNP <10 05/01/2024            reports current alcohol use.  Past Medical History:   Diagnosis Date    Diverticulitis     Hyperlipidemia     Hypertension     On mechanically assisted ventilation 04/24/2024    PVC's (premature ventricular contractions)      Past Surgical History:   Procedure Laterality Date    CORONARY ANGIOGRAPHY N/A 4/24/2024    Procedure: ANGIOGRAM, CORONARY ARTERY;  Surgeon: Fitz Ochoa MD;  Location: [facility] CATH LAB;  Service: Cardiology;  Laterality: N/A;    ESOPHAGOGASTRODUODENOSCOPY Left 5/2/2024    Procedure: EGD (ESOPHAGOGASTRODUODENOSCOPY);  Surgeon: Nelson Swain MD;  Location: [facility] ENDO;  Service: Endoscopy;  Laterality: Left;    INSERTION, ICD, DUAL CHAMBER Left 5/16/2024    Procedure: Insertion, ICD, Dual Chamber/ biotronik;  Surgeon: Etelvina  Pako LAM MD;  Location: [facility] CATH LAB;  Service: Cardiology;  Laterality: Left;  VDX    LEFT HEART CATHETERIZATION Left 4/24/2024    Procedure: Left heart cath;  Surgeon: Fitz Ochoa MD;  Location: [facility] CATH LAB;  Service: Cardiology;  Laterality: Left;    VENTRICULOGRAM, LEFT  4/24/2024    Procedure: Ventriculogram, Left;  Surgeon: Fitz Ochoa MD;  Location: [facility] CATH LAB;  Service: Cardiology;;     No family history on file.    Assessment:    Currently without cardiac issues.  ICD in excellent repair.  1. History of cardiac arrest    2. Presence of automatic implantable cardioverter-defibrillator    3. VF (ventricular fibrillation)    4. Hypertension, unspecified type        Plan:    I discussed routine device follow up including quarterly to bi-annual device checks for device function as well as yearly follow up in the EP clinic. The patient  was advised to call with any concerns regarding their device. Device clinic follow up as scheduled. RTC 1y       Orders Placed This Encounter   Procedures    X-Ray Chest PA And Lateral     Standing Status:   Future     Number of Occurrences:   1     Standing Expiration Date:   8/26/2025       No follow-ups on file.    There are no discontinued medications.         Medication List with Changes/Refills   Current Medications    ALPRAZOLAM (XANAX) 0.25 MG TABLET    Take 0.25 mg by mouth 3 (three) times daily as needed for Anxiety.    ASPIRIN 81 MG CHEW    Take 1 tablet (81 mg total) by mouth once daily.    CEFADROXIL (DURICEF) 500 MG CAP    Take 2 capules tonight at 8 pm then 1 capule every 12 hrs until done.    CHOLECALCIFEROL, VITAMIN D3, 1,250 MCG (50,000 UNIT) CAPSULE    Take 50,000 Units by mouth.    MECLIZINE (ANTIVERT) 25 MG TABLET    Take 25 mg by mouth every evening.    METOPROLOL SUCCINATE (TOPROL-XL) 25 MG 24 HR TABLET    Take 1 tablet (25 mg total) by mouth once daily.    MISOPROSTOL (CYTOTEC) 200 MCG TAB    Take 1 tablet (200 mcg total) by mouth  every 6 (six) hours.    PANTOPRAZOLE (PROTONIX) 40 MG TABLET    Take 1 tablet (40 mg total) by mouth once daily.    ROSUVASTATIN (CRESTOR) 10 MG TABLET    Take 10 mg by mouth.    TESTOSTERONE CYPIONATE (DEPOTESTOTERONE CYPIONATE) 200 MG/ML INJECTION    Inject 200 mg into the muscle every 14 (fourteen) days.       This note is at least partially dictated using the M*Modal Fluency Direct word recognition program. There are word recognition mistakes that are occasionally missed on review.      Pre-visit chart review: 8 min    Face to face time: 25 min, > 50% of which spent in education    I have reviewed the actual images of all relevant ECG, rhythm, holter and long term monitoring tracings available in EPIC, MUSE  and in legacy as well as outside records.   I have reviewed the actual images of all relevant CXRays.   I have directly evaluated all relevant data pertaining to any CIED.     Post visit chart documentation and care coordination: 8 min

## 2024-09-18 ENCOUNTER — CLINICAL SUPPORT (OUTPATIENT)
Dept: CARDIOLOGY | Facility: HOSPITAL | Age: 54
End: 2024-09-18
Payer: COMMERCIAL

## 2024-09-18 ENCOUNTER — CLINICAL SUPPORT (OUTPATIENT)
Dept: CARDIOLOGY | Facility: HOSPITAL | Age: 54
End: 2024-09-18
Attending: INTERNAL MEDICINE
Payer: COMMERCIAL

## 2024-09-18 DIAGNOSIS — Z95.810 PRESENCE OF AUTOMATIC (IMPLANTABLE) CARDIAC DEFIBRILLATOR: ICD-10-CM

## 2024-09-18 DIAGNOSIS — I49.01 VENTRICULAR FIBRILLATION: ICD-10-CM

## 2024-09-18 LAB
OHS CV AF BURDEN PERCENT: < 1
OHS CV DC REMOTE DEVICE TYPE: NORMAL
OHS CV RV PACING PERCENT: 0 %

## 2024-09-18 PROCEDURE — 93295 DEV INTERROG REMOTE 1/2/MLT: CPT | Mod: S$GLB,,, | Performed by: INTERNAL MEDICINE

## 2024-09-18 PROCEDURE — 93296 REM INTERROG EVL PM/IDS: CPT | Performed by: INTERNAL MEDICINE

## 2024-10-04 DIAGNOSIS — Z12.11 SPECIAL SCREENING FOR MALIGNANT NEOPLASMS, COLON: Primary | ICD-10-CM

## 2024-10-07 ENCOUNTER — HOSPITAL ENCOUNTER (OUTPATIENT)
Dept: PREADMISSION TESTING | Facility: HOSPITAL | Age: 54
Discharge: HOME | End: 2024-10-07
Attending: INTERNAL MEDICINE
Payer: COMMERCIAL

## 2024-10-07 DIAGNOSIS — Z12.11 SPECIAL SCREENING FOR MALIGNANT NEOPLASMS, COLON: ICD-10-CM

## 2024-10-10 DIAGNOSIS — K25.3 ACUTE GASTRIC ULCER WITHOUT HEMORRHAGE OR PERFORATION: Primary | ICD-10-CM

## 2024-10-10 DIAGNOSIS — Z12.11 COLON CANCER SCREENING: ICD-10-CM

## 2024-10-10 RX ORDER — SODIUM, POTASSIUM,MAG SULFATES 17.5-3.13G
1 SOLUTION, RECONSTITUTED, ORAL ORAL DAILY
Qty: 1 KIT | Refills: 0 | Status: SHIPPED | OUTPATIENT
Start: 2024-10-10 | End: 2024-10-12

## 2024-10-10 RX ORDER — ACEBUTOLOL HYDROCHLORIDE 200 MG/1
200 CAPSULE ORAL
COMMUNITY
Start: 2024-09-25 | End: 2025-03-24

## 2024-10-31 PROBLEM — Z87.11 HISTORY OF GASTRIC ULCER: Status: ACTIVE | Noted: 2024-10-31

## 2024-10-31 PROBLEM — Z12.11 SPECIAL SCREENING FOR MALIGNANT NEOPLASMS, COLON: Status: ACTIVE | Noted: 2024-10-31

## 2024-11-01 NOTE — H&P (VIEW-ONLY)
Milind Melgar  MR#: [id number]  1970        CARDIOLOGY CLINIC NOTE      IMPRESSION and PLAN  Assessment & Plan  Pre-operative cardiovascular examination  -Patient comes in today as he is scheduled to undergo a colonoscopy on Monday by Dr. Swain at [facility] for chronic anemia  -The patient sent an Kardia strip in and there is concerns about it being idioventricular.  He was told that this could be related to some dehydration so he was made an appointment for same-day clinic.  -He had his device interrogated and was found that at the time of his rhythm strip on the Kardia his device was actually doing a interrogation most likely contribute to his rhythm strip.  -Patient is not having any symptoms of shortness of breath lightheaded or dizziness.  -Low cardiovascular risk and acceptable to proceed from a [place] perspective with his colonoscopy.  ICD (implantable cardioverter-defibrillator) in place  -Device was interrogated and his threshold testing was deactivated to prevent him having symptoms at the time of his device interrogation.  -Continue current plan of management.  PVC (premature ventricular contraction)  -Stable.  Continue current plan of management and is not having any further episodes of palpitations.  -Further discussion by tree management by Dr. Bravo on December 2.  Mixed hyperlipidemia  -Stable continue current plan of management.      Return in about 31 days (around 12/2/2024).    ___________________________________________    Chief Complaint   Patient presents with    Dehydration       SUBJECTIVE:  Milind Melgar is a 54 y.o. male presents to clinic today for evaluation of a rhythm strip that was sent in through [employer].  There was concerned about it being idioventricular and he is scheduled undergo colonoscopy on Monday and there is some discussions about being dehydration contribute to his idioventricular rhythm.  He came in today for evaluation.  It was noted that the rhythm strip  "was done during his pacemaker interrogation at home.  He denies any chest pain denies shortness of breath.    CURRENT MEDICATIONS    Current Outpatient Medications:     acebutoloL (SECTRAL) 200 mg capsule, Take 1 capsule by mouth in the morning for 180 days., Disp: 90 capsule, Rfl: 1    ALPRAZolam (XANAX) 0.25 mg tablet, Take 1 tablet by mouth 2 (two) times daily as needed., Disp: , Rfl:     aspirin 81 MG chewable tablet, Take 1 tablet by mouth in the morning., Disp: , Rfl:     cholecalciferol, vitamin D3, 1,250 mcg (50,000 unit) capsule, Take 1 capsule by mouth Take once weekly., Disp: , Rfl:     pantoprazole (PROTONIX) 40 mg tablet, Take 1 tablet by mouth., Disp: , Rfl:     rosuvastatin (CRESTOR) 40 mg tablet, Take 1 tablet by mouth once daily., Disp: , Rfl:     sodium,potassium,&mag sulfates (SUPER BOWEL PREP) 17.5-3.13-1.6 gram Recon Soln, TAKE 177 ML BY MOUTH DAILY FOR 2 DAYS, Disp: , Rfl:     testosterone cypionate (DEPO-TESTOSTERONE) 200 mg/mL injection, Inject 1 mL into the muscle., Disp: , Rfl:       General ROS: negative for - chills or fatigue  Respiratory ROS: no cough, shortness of breath, or wheezing  Cardiovascular ROS: no chest pain or dyspnea on exertion  Gastrointestinal ROS: no abdominal pain, change in bowel habits, or black or bloody stools      Ht 177.8 cm (70")   Wt 91.4 kg (201 lb 9.6 oz)   BMI 28.93 kg/m²   Body mass index is 28.93 kg/m².    PHYSICAL EXAM:  Vital signs reviewed  CONSTITUTIONAL: cooperative, alert and oriented, well-developed well-nourished in no acute distress       CHEST: - normal rate, normal excursion, clear to ausculation bilaterally, normal symmetry  CARDIAC: - normal rate, regular rhythm, no murmur, no rub, no significant edema      NEUROLOGICAL: - no focal motor deficits noted, affect appropriate, oriented to time, person and place    LABS: Lab reviewed in [facility]         ____________________________          Phillip Benjamin NP    Referring Physician: " Physician, Unmapped    Portions of this dictation used voice recognition software and may have missed pronunciation or misspelled errors despite review.

## 2024-11-04 ENCOUNTER — ANESTHESIA (OUTPATIENT)
Dept: ENDOSCOPY | Facility: HOSPITAL | Age: 54
End: 2024-11-04
Payer: COMMERCIAL

## 2024-11-04 ENCOUNTER — PATIENT MESSAGE (OUTPATIENT)
Dept: GASTROENTEROLOGY | Facility: HOSPITAL | Age: 54
End: 2024-11-04
Payer: COMMERCIAL

## 2024-11-04 ENCOUNTER — ANESTHESIA EVENT (OUTPATIENT)
Dept: ENDOSCOPY | Facility: HOSPITAL | Age: 54
End: 2024-11-04
Payer: COMMERCIAL

## 2024-11-04 ENCOUNTER — HOSPITAL ENCOUNTER (OUTPATIENT)
Facility: HOSPITAL | Age: 54
Discharge: HOME | End: 2024-11-04
Attending: INTERNAL MEDICINE | Admitting: INTERNAL MEDICINE
Payer: COMMERCIAL

## 2024-11-04 DIAGNOSIS — Z12.11 SPECIAL SCREENING FOR MALIGNANT NEOPLASMS, COLON: ICD-10-CM

## 2024-11-04 DIAGNOSIS — Z87.11 HISTORY OF GASTRIC ULCER: Primary | ICD-10-CM

## 2024-11-04 PROCEDURE — 25000003 PHARM REV CODE 250: Performed by: INTERNAL MEDICINE

## 2024-11-04 PROCEDURE — G0121 COLON CA SCRN NOT HI RSK IND: HCPCS | Performed by: INTERNAL MEDICINE

## 2024-11-04 PROCEDURE — G0121 COLON CA SCRN NOT HI RSK IND: HCPCS | Mod: ,,, | Performed by: INTERNAL MEDICINE

## 2024-11-04 PROCEDURE — 25000003 PHARM REV CODE 250: Performed by: STUDENT IN AN ORGANIZED HEALTH CARE EDUCATION/TRAINING PROGRAM

## 2024-11-04 PROCEDURE — 37000009 HC ANESTHESIA EA ADD 15 MINS: Performed by: INTERNAL MEDICINE

## 2024-11-04 PROCEDURE — 63600175 PHARM REV CODE 636 W HCPCS: Performed by: STUDENT IN AN ORGANIZED HEALTH CARE EDUCATION/TRAINING PROGRAM

## 2024-11-04 PROCEDURE — 43235 EGD DIAGNOSTIC BRUSH WASH: CPT | Mod: 51,,, | Performed by: INTERNAL MEDICINE

## 2024-11-04 PROCEDURE — 37000008 HC ANESTHESIA 1ST 15 MINUTES: Performed by: INTERNAL MEDICINE

## 2024-11-04 PROCEDURE — 43235 EGD DIAGNOSTIC BRUSH WASH: CPT | Performed by: INTERNAL MEDICINE

## 2024-11-04 RX ORDER — PROPOFOL 10 MG/ML
VIAL (ML) INTRAVENOUS
Status: DISCONTINUED | OUTPATIENT
Start: 2024-11-04 | End: 2024-11-04

## 2024-11-04 RX ORDER — LIDOCAINE HYDROCHLORIDE 20 MG/ML
INJECTION INTRAVENOUS
Status: DISCONTINUED | OUTPATIENT
Start: 2024-11-04 | End: 2024-11-04

## 2024-11-04 RX ORDER — LIDOCAINE HYDROCHLORIDE 10 MG/ML
INJECTION, SOLUTION EPIDURAL; INFILTRATION; INTRACAUDAL; PERINEURAL
Status: DISCONTINUED | OUTPATIENT
Start: 2024-11-04 | End: 2024-11-04

## 2024-11-04 RX ORDER — DEXTROMETHORPHAN/PSEUDOEPHED 2.5-7.5/.8
DROPS ORAL
Status: DISCONTINUED | OUTPATIENT
Start: 2024-11-04 | End: 2024-11-04 | Stop reason: HOSPADM

## 2024-11-04 RX ADMIN — PROPOFOL 100 MG: 10 INJECTION, EMULSION INTRAVENOUS at 11:11

## 2024-11-04 RX ADMIN — PROPOFOL 50 MG: 10 INJECTION, EMULSION INTRAVENOUS at 11:11

## 2024-11-04 RX ADMIN — SODIUM CHLORIDE: 9 INJECTION, SOLUTION INTRAVENOUS at 11:11

## 2024-11-04 RX ADMIN — LIDOCAINE HYDROCHLORIDE 100 MG: 20 INJECTION INTRAVENOUS at 10:11

## 2024-11-04 NOTE — BRIEF OP NOTE
Endoscopy Discharge Summary      Admit Date: 11/4/2024    Discharge Date and Time:  11/4/2024 11:25 AM    Attending Physician: Nelson Swain MD     Discharge Physician: Nelson Swain MD     Principal Admitting Diagnoses: History of gastric ulcer         Discharge Diagnosis: The primary encounter diagnosis was History of gastric ulcer. A diagnosis of Special screening for malignant neoplasms, colon was also pertinent to this visit.     Discharged Condition: Good    Indication for Admission: History of gastric ulcer     Hospital Course: Patient was admitted for an inpatient procedure and tolerated the procedure well with no complications.    Significant Diagnostic Studies: EGD & COLON    Pathology (if any):  Specimen (24h ago, onward)      None            Disposition: Home.    Bleeding: None    No Implants         Follow Up/Patient Instructions:   Current Discharge Medication List        CONTINUE these medications which have NOT CHANGED    Details   acebutoloL (SECTRAL) 200 MG capsule Take 200 mg by mouth.      cholecalciferol, vitamin D3, 1,250 mcg (50,000 unit) capsule Take 50,000 Units by mouth.      ALPRAZolam (XANAX) 0.25 MG tablet Take 0.25 mg by mouth 3 (three) times daily as needed for Anxiety.      aspirin 81 MG Chew Take 1 tablet (81 mg total) by mouth once daily.  Qty: 30 tablet, Refills: 0      meclizine (ANTIVERT) 25 mg tablet Take 25 mg by mouth every evening.      pantoprazole (PROTONIX) 40 MG tablet Take 1 tablet (40 mg total) by mouth once daily.  Qty: 90 tablet, Refills: 3    Associated Diagnoses: Acute gastric ulcer without hemorrhage or perforation      rosuvastatin (CRESTOR) 10 MG tablet Take 10 mg by mouth.      testosterone cypionate (DEPOTESTOTERONE CYPIONATE) 200 mg/mL injection Inject 200 mg into the muscle every 14 (fourteen) days.           STOP taking these medications       metoprolol succinate (TOPROL-XL) 25 MG 24 hr tablet Comments:   Reason for Stopping:         miSOPROStoL  (CYTOTEC) 200 MCG Tab Comments:   Reason for Stopping:               Discharge Procedure Orders   Diet general     No dressing needed     Call MD for:  temperature >100.4     Call MD for:  persistent nausea and vomiting     Call MD for:  severe uncontrolled pain     Call MD for:  difficulty breathing, headache or visual disturbances     Call MD for:  redness, tenderness, or signs of infection (pain, swelling, redness, odor or green/yellow discharge around incision site)     Call MD for:  hives     Call MD for:  persistent dizziness or light-headedness        Follow-up Information       Benito David MD Follow up.    Specialty: Family Medicine  Why: As needed  Contact information:  [id number] [facility] [id number] [phone]

## 2024-11-04 NOTE — ANESTHESIA PREPROCEDURE EVALUATION
11/04/2024  Milind Melgar is a 54 y.o., male.      Pre-op Assessment    I have reviewed the Patient Summary Reports.     I have reviewed the Nursing Notes. I have reviewed the NPO Status.   I have reviewed the Medications.     Review of Systems  Cardiovascular:     Hypertension                                    Hypertension         Hepatic/GI:      Liver Disease, Hepatitis           Liver Disease, Hepatitis            Physical Exam  General: Well nourished, Cooperative, Alert and Oriented    Airway:  Mallampati: II         Anesthesia Plan  Type of Anesthesia, risks & benefits discussed:    Anesthesia Type: MAC  Intra-op Monitoring Plan: Standard ASA Monitors  Post Op Pain Control Plan: multimodal analgesia  Induction:  IV  Informed Consent: Informed consent signed with the Patient and all parties understand the risks and agree with anesthesia plan.  All questions answered. Patient consented to blood products? Yes  ASA Score: 3  Day of Surgery Review of History & Physical: H&P Update referred to the surgeon/provider.    Ready For Surgery From Anesthesia Perspective.     .

## 2024-11-04 NOTE — PROVATION PATIENT INSTRUCTIONS
Discharge Summary/Instructions after an Endoscopic Procedure  Patient Name: Milind Melgar  Patient MRN: [id number]  Patient YOB: 1970 Monday, November 4, 2024 Nelson Swain MD  Dear patient,  As a result of recent federal legislation (The Federal Cures Act), you may   receive lab or pathology results from your procedure in your [facility]   account before your physician is able to contact you. Your physician or   their representative will relay the results to you with their   recommendations at their soonest availability.  Thank you,  RESTRICTIONS:  During your procedure today, you received medications for sedation.  These   medications may affect your judgment, balance and coordination.  Therefore,   for 24 hours, you have the following restrictions:   - DO NOT drive a car, operate machinery, make legal/financial decisions,   sign important papers or drink alcohol.    ACTIVITY:  Today: no heavy lifting, straining or running due to procedural   sedation/anesthesia.  The following day: return to full activity including work.  DIET:  Eat and drink normally unless instructed otherwise.     TREATMENT FOR COMMON SIDE EFFECTS:  - Mild abdominal pain, nausea, belching, bloating or excessive gas:  rest,   eat lightly and use a heating pad.  - Sore Throat: treat with throat lozenges and/or gargle with warm salt   water.  - Because air was used during the procedure, expelling large amounts of air   from your rectum or belching is normal.  - If a bowel prep was taken, you may not have a bowel movement for 1-3 days.    This is normal.  SYMPTOMS TO WATCH FOR AND REPORT TO YOUR PHYSICIAN:  1. Abdominal pain or bloating, other than gas cramps.  2. Chest pain.  3. Back pain.  4. Signs of infection such as: chills or fever occurring within 24 hours   after the procedure.  5. Rectal bleeding, which would show as bright red, maroon, or black stools.   (A tablespoon of blood from the rectum is not serious, especially if    hemorrhoids are present.)  6. Vomiting.  7. Weakness or dizziness.  GO DIRECTLY TO THE NEAREST EMERGENCY ROOM IF YOU HAVE ANY OF THE FOLLOWING:      Difficulty breathing              Chills and/or fever over 101 F   Persistent vomiting and/or vomiting blood   Severe abdominal pain   Severe chest pain   Black, tarry stools   Bleeding- more than one tablespoon   Any other symptom or condition that you feel may need urgent attention  Your doctor recommends these additional instructions:  If any biopsies were taken, your doctors clinic will contact you in 1 to 2   weeks with any results.  - The patient will be observed post-procedure, until all discharge criteria   are met.   - Discharge patient to home (ambulatory).   - Patient has a contact number available for emergencies.  The signs and   symptoms of potential delayed complications were discussed with the   patient.  Return to normal activities tomorrow.  Written discharge   instructions were provided to the patient.   - Resume previous diet.   - Continue present medications.   - Return to referring physician as previously scheduled.  For questions, problems or results please call your physician Nelson Swain MD at Work:  [phone]  If you have any questions about the above instructions, call the GI   department at [phone] or call the endoscopy unit at [phone]   from 7am until 3 pm.  [facility] [place], EMERGENCY ROOM PHONE NUMBER:   [phone]  IF A COMPLICATION OR EMERGENCY SITUATION ARISES AND YOU ARE UNABLE TO REACH   YOUR PHYSICIAN - GO DIRECTLY TO THE EMERGENCY ROOM.  I have read or have had read to me these discharge instructions for my   procedure and have received a written copy.  I understand these   instructions and will follow-up with my physician if I have any questions.     __________________________________       _____________________________________  Nurse Signature                                           Patient/Designated   Responsible Party Signature  Nelson Swain MD  11/4/2024 11:23:52 AM  This report has been verified and signed electronically.  Dear patient,  As a result of recent federal legislation (The Federal Cures Act), you may   receive lab or pathology results from your procedure in your [facility]   account before your physician is able to contact you. Your physician or   their representative will relay the results to you with their   recommendations at their soonest availability.  Thank you,  PROVATION

## 2024-11-04 NOTE — PROVATION PATIENT INSTRUCTIONS
Discharge Summary/Instructions after an Endoscopic Procedure  Patient Name: Milind Melgar  Patient MRN: [id number]  Patient YOB: 1970 Monday, November 4, 2024 Nelson Swain MD  Dear patient,  As a result of recent federal legislation (The Federal Cures Act), you may   receive lab or pathology results from your procedure in your [facility]   account before your physician is able to contact you. Your physician or   their representative will relay the results to you with their   recommendations at their soonest availability.  Thank you,  RESTRICTIONS:  During your procedure today, you received medications for sedation.  These   medications may affect your judgment, balance and coordination.  Therefore,   for 24 hours, you have the following restrictions:   - DO NOT drive a car, operate machinery, make legal/financial decisions,   sign important papers or drink alcohol.    ACTIVITY:  Today: no heavy lifting, straining or running due to procedural   sedation/anesthesia.  The following day: return to full activity including work.  DIET:  Eat and drink normally unless instructed otherwise.     TREATMENT FOR COMMON SIDE EFFECTS:  - Mild abdominal pain, nausea, belching, bloating or excessive gas:  rest,   eat lightly and use a heating pad.  - Sore Throat: treat with throat lozenges and/or gargle with warm salt   water.  - Because air was used during the procedure, expelling large amounts of air   from your rectum or belching is normal.  - If a bowel prep was taken, you may not have a bowel movement for 1-3 days.    This is normal.  SYMPTOMS TO WATCH FOR AND REPORT TO YOUR PHYSICIAN:  1. Abdominal pain or bloating, other than gas cramps.  2. Chest pain.  3. Back pain.  4. Signs of infection such as: chills or fever occurring within 24 hours   after the procedure.  5. Rectal bleeding, which would show as bright red, maroon, or black stools.   (A tablespoon of blood from the rectum is not serious, especially if    hemorrhoids are present.)  6. Vomiting.  7. Weakness or dizziness.  GO DIRECTLY TO THE NEAREST EMERGENCY ROOM IF YOU HAVE ANY OF THE FOLLOWING:      Difficulty breathing              Chills and/or fever over 101 F   Persistent vomiting and/or vomiting blood   Severe abdominal pain   Severe chest pain   Black, tarry stools   Bleeding- more than one tablespoon   Any other symptom or condition that you feel may need urgent attention  Your doctor recommends these additional instructions:  If any biopsies were taken, your doctors clinic will contact you in 1 to 2   weeks with any results.  - The patient will be observed post-procedure, until all discharge criteria   are met.   - Discharge patient to home (ambulatory).   - Patient has a contact number available for emergencies.  The signs and   symptoms of potential delayed complications were discussed with the   patient.  Return to normal activities tomorrow.  Written discharge   instructions were provided to the patient.   - Resume previous diet.   - Continue present medications.   - Repeat colonoscopy in 10 years for screening purposes.   - Return to referring physician as previously scheduled.  For questions, problems or results please call your physician Nelson Swain MD at Work:  [phone]  If you have any questions about the above instructions, call the GI   department at [phone] or call the endoscopy unit at [phone]   from 7am until 3 pm.  [facility] [place], EMERGENCY ROOM PHONE NUMBER:   [phone]  IF A COMPLICATION OR EMERGENCY SITUATION ARISES AND YOU ARE UNABLE TO REACH   YOUR PHYSICIAN - GO DIRECTLY TO THE EMERGENCY ROOM.  I have read or have had read to me these discharge instructions for my   procedure and have received a written copy.  I understand these   instructions and will follow-up with my physician if I have any questions.     __________________________________        _____________________________________  Nurse Signature                                          Patient/Designated   Responsible Party Signature  Nelson Swain MD  11/4/2024 11:21:51 AM  This report has been verified and signed electronically.  Dear patient,  As a result of recent federal legislation (The Federal Cures Act), you may   receive lab or pathology results from your procedure in your [facility]   account before your physician is able to contact you. Your physician or   their representative will relay the results to you with their   recommendations at their soonest availability.  Thank you,  PROVATION

## 2024-11-04 NOTE — INTERVAL H&P NOTE
"The patient has been examined and the H&P has been reviewed:    I concur with the findings and no changes have occurred since H&P was written.    Procedure risks, benefits and alternative options discussed and understood by patient/family.    Vitals:    11/04/24 1003   BP: (!) 150/93   BP Location: Left arm   Patient Position: Lying   Pulse: 69   Resp: 20   Temp: 99.1 °F (37.3 °C)   TempSrc: Temporal   SpO2: 99%   Weight: 88 kg (194 lb)   Height: 5' 9" (1.753 m)         Active Hospital Problems    Diagnosis  POA    *History of gastric ulcer [Z87.11]  Not Applicable    Special screening for malignant neoplasms, colon [Z12.11]  Not Applicable      Resolved Hospital Problems   No resolved problems to display.     "

## 2024-11-04 NOTE — ANESTHESIA POSTPROCEDURE EVALUATION
Anesthesia Post Evaluation    Patient: Milind Melgar    Procedure(s) Performed: Procedure(s) (LRB):  EGD (ESOPHAGOGASTRODUODENOSCOPY) (N/A)  COLONOSCOPY  Cardiac clearance received per [facility] on 10/09/24.  Scanned into media.  LB (N/A)    Final Anesthesia Type: MAC      Patient location during evaluation: PACU  Patient participation: Yes- Able to Participate  Level of consciousness: awake and alert and oriented  Post-procedure vital signs: reviewed and stable  Pain management: adequate  Airway patency: patent  CECIL mitigation strategies: Multimodal analgesia and Extubation and recovery carried out in lateral, semiupright, or other nonsupine position  PONV status at discharge: No PONV  Anesthetic complications: no      Cardiovascular status: blood pressure returned to baseline and hemodynamically stable  Respiratory status: unassisted and spontaneous ventilation  Hydration status: euvolemic  Follow-up not needed.  Comments: Report given to PACU RN. Hand Off Tool Used. RN given opportunity to ask questions or clarify concerns. No Concerns verbalized. Pt. Left in stable condition. SV. Vital Signs Return to Near Baseline. No s/s of distress noted.               Vitals Value Taken Time   /96 11/04/24 1123   Temp 36.6 °C (97.9 °F) 11/04/24 1123   Pulse 78 11/04/24 1123   Resp 17 11/04/24 1123   SpO2 98 % 11/04/24 1123         No case tracking events are documented in the log.      Pain/Yobani Score: No data recorded

## 2024-11-04 NOTE — TRANSFER OF CARE
"Anesthesia Transfer of Care Note    Patient: Milind Melgar    Procedure(s) Performed: Procedure(s) (LRB):  EGD (ESOPHAGOGASTRODUODENOSCOPY) (N/A)  COLONOSCOPY  Cardiac clearance received per [facility] on 10/09/24.  Scanned into media.  LB (N/A)    Patient location: PACU    Anesthesia Type: MAC    Transport from OR: Transported from OR on room air with adequate spontaneous ventilation    Post pain: adequate analgesia    Post assessment: no apparent anesthetic complications    Post vital signs: stable    Level of consciousness: responds to stimulation and awake    Nausea/Vomiting: no nausea/vomiting    Complications: none    Transfer of care protocol was followedComments: Report given to PACU RN at bedside. Hand off tool used. RN given opportunity to ask questions or clarify concerns. No Concerns verbalized. RN was asked if ready to assume care of patient. RN verbally confirmed. Pt. left in stable condition. SV. Vital Signs Return to Near Baseline. No s/s of distress noted.       Last vitals: Visit Vitals  BP (!) 150/93 (BP Location: Left arm, Patient Position: Lying)   Pulse 69   Temp 37.3 °C (99.1 °F) (Temporal)   Resp 20   Ht 5' 9" (1.753 m)   Wt 88 kg (194 lb)   SpO2 99%   BMI 28.65 kg/m²     "

## 2024-11-04 NOTE — DISCHARGE INSTRUCTIONS
Dear Milind Melgar      If you develop fevers, severe abdominal pain, significant bleeding, nausea or vomiting, please contact us or come to our Emergency Department at [facility].  Our  contact number is [phone] available for emergencies or any question that you may have.      You may return to normal activities tomorrow.  Written discharge instructions were provided to you today and have them handy since you may not remember our conversation today.       If you take Aspirin, please resume taking it at your prior dose today. If we obtained biopsies or removed polyps during your procedure, we will contact you within 5 to 6 days with the results.      Thanks for trusting us with your healthcare needs.      Sincerely,     Nelson Swain M.D.        To rate your experience with Dr. Swain, please click on the link below     [email or web address]

## 2024-11-05 VITALS
TEMPERATURE: 98 F | DIASTOLIC BLOOD PRESSURE: 95 MMHG | RESPIRATION RATE: 17 BRPM | HEIGHT: 69 IN | WEIGHT: 194 LBS | HEART RATE: 69 BPM | SYSTOLIC BLOOD PRESSURE: 147 MMHG | BODY MASS INDEX: 28.73 KG/M2 | OXYGEN SATURATION: 100 %

## 2024-12-19 ENCOUNTER — CLINICAL SUPPORT (OUTPATIENT)
Dept: CARDIOLOGY | Facility: HOSPITAL | Age: 54
End: 2024-12-19
Payer: COMMERCIAL

## 2024-12-19 ENCOUNTER — CLINICAL SUPPORT (OUTPATIENT)
Dept: CARDIOLOGY | Facility: HOSPITAL | Age: 54
End: 2024-12-19
Attending: INTERNAL MEDICINE
Payer: COMMERCIAL

## 2024-12-19 DIAGNOSIS — I49.01 VENTRICULAR FIBRILLATION: ICD-10-CM

## 2024-12-19 DIAGNOSIS — Z95.810 PRESENCE OF AUTOMATIC (IMPLANTABLE) CARDIAC DEFIBRILLATOR: ICD-10-CM

## 2024-12-19 PROCEDURE — 93296 REM INTERROG EVL PM/IDS: CPT | Performed by: INTERNAL MEDICINE

## 2024-12-19 PROCEDURE — 93295 DEV INTERROG REMOTE 1/2/MLT: CPT | Mod: S$GLB,,, | Performed by: INTERNAL MEDICINE

## 2024-12-23 LAB
OHS CV AF BURDEN PERCENT: < 1
OHS CV DC REMOTE DEVICE TYPE: NORMAL
OHS CV RV PACING PERCENT: 0 %

## 2025-03-10 ENCOUNTER — TELEPHONE (OUTPATIENT)
Dept: CARDIOLOGY | Facility: HOSPITAL | Age: 55
End: 2025-03-10
Payer: COMMERCIAL

## 2025-03-10 NOTE — TELEPHONE ENCOUNTER
Received request from [facility] to transfer this patient's home monitoring to Dr. Jose Phelps at [facility].  Called patient to confirm, no answer, left message requesting return call to device clinic.

## 2025-03-10 NOTE — TELEPHONE ENCOUNTER
Pt returned call, he gave permission to release home monitoring to Dr. Jose Phelps.  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  Pt profile was deactivated in vendor site, [facility] notified to stop remote monitoring.

## (undated) DEVICE — CATH PIG145 INFINITI 5X110CM

## (undated) DEVICE — PAD GROUNDING DISPER ELECTRODE

## (undated) DEVICE — PAD DEFIB CADENCE ADULT R2

## (undated) DEVICE — PENCIL SMOKE EVAC ROCKER 70MM

## (undated) DEVICE — CATH JL4 5FR

## (undated) DEVICE — DRAPE ANGIO BRACH 38X44IN

## (undated) DEVICE — SUT 4/0 18IN COATED VICRYL

## (undated) DEVICE — SHEATH SAFE ULTRA 10FRX13CM

## (undated) DEVICE — OMNIPAQUE 300MG 150ML VIAL

## (undated) DEVICE — ANGIOTOUCH KIT

## (undated) DEVICE — POWDER ARISTA AH 1GM

## (undated) DEVICE — SYR BULB 60CC IRRIGATION

## (undated) DEVICE — PACK PACER PERMANENT OMC

## (undated) DEVICE — SUT 3-0 VICRYL / SH (J416)

## (undated) DEVICE — ADHESIVE DERMABOND ADVANCED

## (undated) DEVICE — SCALPEL SZ 10 RETRACTABLE

## (undated) DEVICE — GUIDE WIRE WHOLEY EXCHANGE 300

## (undated) DEVICE — SUT VICRYL 2-0 CT-2 VCP269H

## (undated) DEVICE — KIT GLIDESHEATH SLEND 6FR 10CM

## (undated) DEVICE — SHEET THYROID W/ISO-BAC

## (undated) DEVICE — NDL PERCUTANEOUS 21G 7CM VASC

## (undated) DEVICE — CATH INFINITI MULTIPAK JR4 5FR

## (undated) DEVICE — PACK HEART CATH BR

## (undated) DEVICE — GUIDEWIRE EMERALD .035IN 260CM

## (undated) DEVICE — DRESSING AQUACEL AG ADV 3.5X6

## (undated) DEVICE — CAUTERY BOVIE PENCIL

## (undated) DEVICE — BAND TR COMP DEVICE REG 24CM

## (undated) DEVICE — CATH JR4 5FR